# Patient Record
Sex: FEMALE | Race: WHITE | Employment: FULL TIME | URBAN - METROPOLITAN AREA
[De-identification: names, ages, dates, MRNs, and addresses within clinical notes are randomized per-mention and may not be internally consistent; named-entity substitution may affect disease eponyms.]

---

## 2017-02-09 ENCOUNTER — HOSPITAL ENCOUNTER (OUTPATIENT)
Age: 27
Setting detail: OBSERVATION
LOS: 1 days | Discharge: HOME | End: 2017-02-10
Attending: EMERGENCY MEDICINE | Admitting: HOSPITALIST
Payer: COMMERCIAL

## 2017-02-09 DIAGNOSIS — E10.10 DIABETIC KETOACIDOSIS WITHOUT COMA ASSOCIATED WITH TYPE 1 DIABETES MELLITUS (HCC): Primary | ICD-10-CM

## 2017-02-09 LAB
ADMINISTERED INITIALS, ADMINIT: NORMAL
ADMINISTERED INITIALS, ADMINIT: NORMAL
ALBUMIN SERPL BCP-MCNC: 4.5 G/DL (ref 3.4–5)
ALBUMIN/GLOB SERPL: 1 {RATIO} (ref 0.8–1.7)
ALP SERPL-CCNC: 63 U/L (ref 45–117)
ALT SERPL-CCNC: 22 U/L (ref 13–56)
ANION GAP BLD CALC-SCNC: 23 MMOL/L (ref 3–18)
APPEARANCE UR: CLEAR
AST SERPL W P-5'-P-CCNC: 11 U/L (ref 15–37)
BACTERIA URNS QL MICRO: NEGATIVE /HPF
BASOPHILS # BLD AUTO: 0 K/UL (ref 0–0.06)
BASOPHILS # BLD: 0 % (ref 0–3)
BILIRUB SERPL-MCNC: 0.7 MG/DL (ref 0.2–1)
BILIRUB UR QL: NEGATIVE
BUN SERPL-MCNC: 19 MG/DL (ref 7–18)
BUN/CREAT SERPL: 18 (ref 12–20)
CALCIUM SERPL-MCNC: 9.5 MG/DL (ref 8.5–10.1)
CHLORIDE SERPL-SCNC: 101 MMOL/L (ref 100–108)
CO2 SERPL-SCNC: 12 MMOL/L (ref 21–32)
COLOR UR: YELLOW
CREAT SERPL-MCNC: 1.08 MG/DL (ref 0.6–1.3)
D50 ADMINISTERED, D50ADM: 0 ML
D50 ADMINISTERED, D50ADM: 0 ML
D50 ORDER, D50ORD: 0 ML
D50 ORDER, D50ORD: 0 ML
DIFFERENTIAL METHOD BLD: ABNORMAL
EOSINOPHIL # BLD: 0 K/UL (ref 0–0.4)
EOSINOPHIL NFR BLD: 0 % (ref 0–5)
EPITH CASTS URNS QL MICRO: NORMAL /LPF (ref 0–5)
ERYTHROCYTE [DISTWIDTH] IN BLOOD BY AUTOMATED COUNT: 12.6 % (ref 11.6–14.5)
GLOBULIN SER CALC-MCNC: 4.6 G/DL (ref 2–4)
GLUCOSE BLD STRIP.AUTO-MCNC: 195 MG/DL (ref 70–110)
GLUCOSE BLD STRIP.AUTO-MCNC: 238 MG/DL (ref 70–110)
GLUCOSE SERPL-MCNC: 378 MG/DL (ref 74–99)
GLUCOSE UR STRIP.AUTO-MCNC: >1000 MG/DL
GLUCOSE, GLC: 195 MG/DL
GLUCOSE, GLC: 238 MG/DL
HCG UR QL: NEGATIVE
HCT VFR BLD AUTO: 51.2 % (ref 35–45)
HGB BLD-MCNC: 17.3 G/DL (ref 12–16)
HGB UR QL STRIP: NEGATIVE
HIGH TARGET, HITG: 180 MG/DL
HIGH TARGET, HITG: 180 MG/DL
INSULIN ADMINSTERED, INSADM: 3.6 UNITS/HOUR
INSULIN ADMINSTERED, INSADM: 4.1 UNITS/HOUR
INSULIN ORDER, INSORD: 3.6 UNITS/HOUR
INSULIN ORDER, INSORD: 4.1 UNITS/HOUR
KETONES UR QL STRIP.AUTO: >160 MG/DL
LEUKOCYTE ESTERASE UR QL STRIP.AUTO: NEGATIVE
LOW TARGET, LOT: 140 MG/DL
LOW TARGET, LOT: 140 MG/DL
LYMPHOCYTES # BLD AUTO: 4 % (ref 20–51)
LYMPHOCYTES # BLD: 0.8 K/UL (ref 0.8–3.5)
MCH RBC QN AUTO: 33.3 PG (ref 24–34)
MCHC RBC AUTO-ENTMCNC: 33.8 G/DL (ref 31–37)
MCV RBC AUTO: 98.7 FL (ref 74–97)
MINUTES UNTIL NEXT BG, NBG: 60 MIN
MINUTES UNTIL NEXT BG, NBG: 60 MIN
MONOCYTES # BLD: 0.4 K/UL (ref 0–1)
MONOCYTES NFR BLD AUTO: 2 % (ref 2–9)
MULTIPLIER, MUL: 0.02
MULTIPLIER, MUL: 0.03
NEUTS SEG # BLD: 18.8 K/UL (ref 1.8–8)
NEUTS SEG NFR BLD AUTO: 94 % (ref 42–75)
NITRITE UR QL STRIP.AUTO: NEGATIVE
ORDER INITIALS, ORDINIT: NORMAL
ORDER INITIALS, ORDINIT: NORMAL
PH UR STRIP: 5 [PH] (ref 5–8)
PLATELET # BLD AUTO: 265 K/UL (ref 135–420)
PLATELET COMMENTS,PCOM: ABNORMAL
PMV BLD AUTO: 10.9 FL (ref 9.2–11.8)
POTASSIUM SERPL-SCNC: 5.1 MMOL/L (ref 3.5–5.5)
PROT SERPL-MCNC: 9.1 G/DL (ref 6.4–8.2)
PROT UR STRIP-MCNC: 30 MG/DL
RBC # BLD AUTO: 5.19 M/UL (ref 4.2–5.3)
RBC #/AREA URNS HPF: NORMAL /HPF (ref 0–5)
RBC MORPH BLD: ABNORMAL
SODIUM SERPL-SCNC: 136 MMOL/L (ref 136–145)
SP GR UR REFRACTOMETRY: >1.03 (ref 1–1.03)
UROBILINOGEN UR QL STRIP.AUTO: 0.2 EU/DL (ref 0.2–1)
WBC # BLD AUTO: 20 K/UL (ref 4.6–13.2)
WBC URNS QL MICRO: NORMAL /HPF (ref 0–4)

## 2017-02-09 PROCEDURE — 74011250636 HC RX REV CODE- 250/636: Performed by: PHYSICIAN ASSISTANT

## 2017-02-09 PROCEDURE — 80053 COMPREHEN METABOLIC PANEL: CPT

## 2017-02-09 PROCEDURE — 85025 COMPLETE CBC W/AUTO DIFF WBC: CPT

## 2017-02-09 PROCEDURE — 74011250637 HC RX REV CODE- 250/637: Performed by: EMERGENCY MEDICINE

## 2017-02-09 PROCEDURE — 96375 TX/PRO/DX INJ NEW DRUG ADDON: CPT

## 2017-02-09 PROCEDURE — 99285 EMERGENCY DEPT VISIT HI MDM: CPT

## 2017-02-09 PROCEDURE — 96361 HYDRATE IV INFUSION ADD-ON: CPT

## 2017-02-09 PROCEDURE — 96376 TX/PRO/DX INJ SAME DRUG ADON: CPT

## 2017-02-09 PROCEDURE — 93005 ELECTROCARDIOGRAM TRACING: CPT

## 2017-02-09 PROCEDURE — 74011636637 HC RX REV CODE- 636/637: Performed by: EMERGENCY MEDICINE

## 2017-02-09 PROCEDURE — 82962 GLUCOSE BLOOD TEST: CPT

## 2017-02-09 PROCEDURE — 74011250636 HC RX REV CODE- 250/636: Performed by: EMERGENCY MEDICINE

## 2017-02-09 PROCEDURE — 96366 THER/PROPH/DIAG IV INF ADDON: CPT

## 2017-02-09 PROCEDURE — 81025 URINE PREGNANCY TEST: CPT

## 2017-02-09 PROCEDURE — 74011000258 HC RX REV CODE- 258: Performed by: EMERGENCY MEDICINE

## 2017-02-09 PROCEDURE — 96365 THER/PROPH/DIAG IV INF INIT: CPT

## 2017-02-09 PROCEDURE — 81001 URINALYSIS AUTO W/SCOPE: CPT

## 2017-02-09 RX ORDER — MAGNESIUM SULFATE 100 %
4 CRYSTALS MISCELLANEOUS AS NEEDED
Status: DISCONTINUED | OUTPATIENT
Start: 2017-02-09 | End: 2017-02-10 | Stop reason: HOSPADM

## 2017-02-09 RX ORDER — ONDANSETRON 4 MG/1
4 TABLET, ORALLY DISINTEGRATING ORAL
Status: COMPLETED | OUTPATIENT
Start: 2017-02-09 | End: 2017-02-09

## 2017-02-09 RX ORDER — DEXTROSE 50 % IN WATER (D50W) INTRAVENOUS SYRINGE
25-50 AS NEEDED
Status: DISCONTINUED | OUTPATIENT
Start: 2017-02-09 | End: 2017-02-10 | Stop reason: HOSPADM

## 2017-02-09 RX ORDER — ONDANSETRON 2 MG/ML
4 INJECTION INTRAMUSCULAR; INTRAVENOUS
Status: COMPLETED | OUTPATIENT
Start: 2017-02-09 | End: 2017-02-09

## 2017-02-09 RX ADMIN — ONDANSETRON 4 MG: 4 TABLET, ORALLY DISINTEGRATING ORAL at 23:51

## 2017-02-09 RX ADMIN — SODIUM CHLORIDE 2000 ML: 900 INJECTION, SOLUTION INTRAVENOUS at 23:51

## 2017-02-09 RX ADMIN — SODIUM CHLORIDE 3.6 UNITS/HR: 900 INJECTION, SOLUTION INTRAVENOUS at 21:48

## 2017-02-09 RX ADMIN — ONDANSETRON 4 MG: 2 INJECTION INTRAMUSCULAR; INTRAVENOUS at 20:22

## 2017-02-09 RX ADMIN — SODIUM CHLORIDE 1000 ML: 900 INJECTION, SOLUTION INTRAVENOUS at 20:24

## 2017-02-09 NOTE — LETTER
[address] [place] [address] [facility] EMERGENCY DEPT 
[address] [facility] [id number] [phone] Work/School Note Date: 2/9/2017 To Whom It May concern: 
 
Argelia Vail was seen and treated today in the emergency room by the following provider(s): 
Attending Provider: Misti Noyola MD. Argelia Vail may return to work on 2/11/17. Sincerely, Nikia Stewart RN

## 2017-02-09 NOTE — Clinical Note
Status[de-identified] Inpatient [101] Type of Bed: Stepdown [17] Inpatient Hospitalization Certified Necessary for the Following Reasons: 8. Other (further clarification in H&P documentation) Admitting Diagnosis: DKA (diabetic ketoacidoses) ([facility] [[id number]] Admitting Physician: Marck Salcedo Attending Physician: Marck Salcedo Estimated Length of Stay: > or = to 2 Midnights Discharge Plan[de-identified] Home with Office Follow-up

## 2017-02-10 VITALS
TEMPERATURE: 97.3 F | HEIGHT: 62 IN | OXYGEN SATURATION: 100 % | SYSTOLIC BLOOD PRESSURE: 104 MMHG | BODY MASS INDEX: 20.8 KG/M2 | HEART RATE: 134 BPM | WEIGHT: 113 LBS | RESPIRATION RATE: 19 BRPM | DIASTOLIC BLOOD PRESSURE: 53 MMHG

## 2017-02-10 PROBLEM — E11.10 DKA (DIABETIC KETOACIDOSES): Status: ACTIVE | Noted: 2017-02-10

## 2017-02-10 LAB
ANION GAP BLD CALC-SCNC: 12 MMOL/L (ref 3–18)
ANION GAP BLD CALC-SCNC: 16 MMOL/L (ref 3–18)
ANION GAP BLD CALC-SCNC: 9 MMOL/L (ref 3–18)
ATRIAL RATE: 144 BPM
BASOPHILS # BLD AUTO: 0 K/UL (ref 0–0.1)
BASOPHILS # BLD: 0 % (ref 0–2)
BUN SERPL-MCNC: 13 MG/DL (ref 7–18)
BUN SERPL-MCNC: 13 MG/DL (ref 7–18)
BUN SERPL-MCNC: 15 MG/DL (ref 7–18)
BUN/CREAT SERPL: 22 (ref 12–20)
BUN/CREAT SERPL: 23 (ref 12–20)
BUN/CREAT SERPL: 27 (ref 12–20)
CALCIUM SERPL-MCNC: 7.9 MG/DL (ref 8.5–10.1)
CALCIUM SERPL-MCNC: 8.1 MG/DL (ref 8.5–10.1)
CALCIUM SERPL-MCNC: 8.2 MG/DL (ref 8.5–10.1)
CALCULATED P AXIS, ECG09: 82 DEGREES
CALCULATED R AXIS, ECG10: 84 DEGREES
CALCULATED T AXIS, ECG11: 46 DEGREES
CHLORIDE SERPL-SCNC: 108 MMOL/L (ref 100–108)
CHLORIDE SERPL-SCNC: 108 MMOL/L (ref 100–108)
CHLORIDE SERPL-SCNC: 110 MMOL/L (ref 100–108)
CO2 SERPL-SCNC: 14 MMOL/L (ref 21–32)
CO2 SERPL-SCNC: 17 MMOL/L (ref 21–32)
CO2 SERPL-SCNC: 23 MMOL/L (ref 21–32)
CREAT SERPL-MCNC: 0.55 MG/DL (ref 0.6–1.3)
CREAT SERPL-MCNC: 0.56 MG/DL (ref 0.6–1.3)
CREAT SERPL-MCNC: 0.59 MG/DL (ref 0.6–1.3)
DIAGNOSIS, 93000: NORMAL
DIFFERENTIAL METHOD BLD: ABNORMAL
EOSINOPHIL # BLD: 0.1 K/UL (ref 0–0.4)
EOSINOPHIL NFR BLD: 1 % (ref 0–5)
ERYTHROCYTE [DISTWIDTH] IN BLOOD BY AUTOMATED COUNT: 12.9 % (ref 11.6–14.5)
GLUCOSE BLD STRIP.AUTO-MCNC: 145 MG/DL (ref 70–110)
GLUCOSE BLD STRIP.AUTO-MCNC: 153 MG/DL (ref 70–110)
GLUCOSE BLD STRIP.AUTO-MCNC: 89 MG/DL (ref 70–110)
GLUCOSE SERPL-MCNC: 130 MG/DL (ref 74–99)
GLUCOSE SERPL-MCNC: 145 MG/DL (ref 74–99)
GLUCOSE SERPL-MCNC: 237 MG/DL (ref 74–99)
HCT VFR BLD AUTO: 39.7 % (ref 35–45)
HGB BLD-MCNC: 13.6 G/DL (ref 12–16)
LYMPHOCYTES # BLD AUTO: 16 % (ref 21–52)
LYMPHOCYTES # BLD: 1.1 K/UL (ref 0.9–3.6)
MCH RBC QN AUTO: 32.7 PG (ref 24–34)
MCHC RBC AUTO-ENTMCNC: 34.3 G/DL (ref 31–37)
MCV RBC AUTO: 95.4 FL (ref 74–97)
MONOCYTES # BLD: 0.4 K/UL (ref 0.05–1.2)
MONOCYTES NFR BLD AUTO: 6 % (ref 3–10)
NEUTS SEG # BLD: 5.4 K/UL (ref 1.8–8)
NEUTS SEG NFR BLD AUTO: 77 % (ref 40–73)
P-R INTERVAL, ECG05: 120 MS
PHOSPHATE SERPL-MCNC: 1.6 MG/DL (ref 2.5–4.9)
PLATELET # BLD AUTO: 195 K/UL (ref 135–420)
PMV BLD AUTO: 10.7 FL (ref 9.2–11.8)
POTASSIUM SERPL-SCNC: 3.8 MMOL/L (ref 3.5–5.5)
POTASSIUM SERPL-SCNC: 4 MMOL/L (ref 3.5–5.5)
POTASSIUM SERPL-SCNC: 4.3 MMOL/L (ref 3.5–5.5)
Q-T INTERVAL, ECG07: 284 MS
QRS DURATION, ECG06: 68 MS
QTC CALCULATION (BEZET), ECG08: 439 MS
RBC # BLD AUTO: 4.16 M/UL (ref 4.2–5.3)
SODIUM SERPL-SCNC: 138 MMOL/L (ref 136–145)
SODIUM SERPL-SCNC: 139 MMOL/L (ref 136–145)
SODIUM SERPL-SCNC: 140 MMOL/L (ref 136–145)
VENTRICULAR RATE, ECG03: 144 BPM
WBC # BLD AUTO: 7 K/UL (ref 4.6–13.2)

## 2017-02-10 PROCEDURE — 74011250636 HC RX REV CODE- 250/636: Performed by: EMERGENCY MEDICINE

## 2017-02-10 PROCEDURE — 99218 HC RM OBSERVATION: CPT

## 2017-02-10 PROCEDURE — 84100 ASSAY OF PHOSPHORUS: CPT | Performed by: EMERGENCY MEDICINE

## 2017-02-10 PROCEDURE — 80048 BASIC METABOLIC PNL TOTAL CA: CPT | Performed by: EMERGENCY MEDICINE

## 2017-02-10 PROCEDURE — 85025 COMPLETE CBC W/AUTO DIFF WBC: CPT | Performed by: HOSPITALIST

## 2017-02-10 PROCEDURE — 96361 HYDRATE IV INFUSION ADD-ON: CPT

## 2017-02-10 PROCEDURE — 82962 GLUCOSE BLOOD TEST: CPT

## 2017-02-10 PROCEDURE — 74011000258 HC RX REV CODE- 258: Performed by: HOSPITALIST

## 2017-02-10 RX ORDER — INSULIN GLARGINE 100 [IU]/ML
35 INJECTION, SOLUTION SUBCUTANEOUS
Status: DISCONTINUED | OUTPATIENT
Start: 2017-02-10 | End: 2017-02-10 | Stop reason: HOSPADM

## 2017-02-10 RX ORDER — ADHESIVE BANDAGE
30 BANDAGE TOPICAL DAILY PRN
Status: DISCONTINUED | OUTPATIENT
Start: 2017-02-10 | End: 2017-02-10 | Stop reason: HOSPADM

## 2017-02-10 RX ORDER — SODIUM,POTASSIUM PHOSPHATES 280-250MG
2 POWDER IN PACKET (EA) ORAL 2 TIMES DAILY
Status: DISCONTINUED | OUTPATIENT
Start: 2017-02-10 | End: 2017-02-10 | Stop reason: HOSPADM

## 2017-02-10 RX ORDER — ONDANSETRON 2 MG/ML
4 INJECTION INTRAMUSCULAR; INTRAVENOUS
Status: DISCONTINUED | OUTPATIENT
Start: 2017-02-10 | End: 2017-02-10 | Stop reason: HOSPADM

## 2017-02-10 RX ORDER — ENOXAPARIN SODIUM 100 MG/ML
40 INJECTION SUBCUTANEOUS DAILY
Status: DISCONTINUED | OUTPATIENT
Start: 2017-02-10 | End: 2017-02-10 | Stop reason: HOSPADM

## 2017-02-10 RX ORDER — ONDANSETRON 4 MG/1
4 TABLET, ORALLY DISINTEGRATING ORAL
Qty: 15 TAB | Refills: 0 | Status: ON HOLD | OUTPATIENT
Start: 2017-02-10 | End: 2017-03-25

## 2017-02-10 RX ORDER — FAMOTIDINE 20 MG/1
20 TABLET, FILM COATED ORAL 2 TIMES DAILY
Status: DISCONTINUED | OUTPATIENT
Start: 2017-02-10 | End: 2017-02-10 | Stop reason: HOSPADM

## 2017-02-10 RX ORDER — DEXTROSE MONOHYDRATE AND SODIUM CHLORIDE 5; .9 G/100ML; G/100ML
125 INJECTION, SOLUTION INTRAVENOUS CONTINUOUS
Status: DISCONTINUED | OUTPATIENT
Start: 2017-02-10 | End: 2017-02-10

## 2017-02-10 RX ORDER — INSULIN GLARGINE 100 [IU]/ML
35 INJECTION, SOLUTION SUBCUTANEOUS DAILY
Qty: 1 VIAL | Refills: 0 | Status: SHIPPED | OUTPATIENT
Start: 2017-02-10 | End: 2018-09-21

## 2017-02-10 RX ORDER — ACETAMINOPHEN 325 MG/1
650 TABLET ORAL
Status: DISCONTINUED | OUTPATIENT
Start: 2017-02-10 | End: 2017-02-10 | Stop reason: HOSPADM

## 2017-02-10 RX ORDER — ONDANSETRON 2 MG/ML
4 INJECTION INTRAMUSCULAR; INTRAVENOUS
Status: COMPLETED | OUTPATIENT
Start: 2017-02-10 | End: 2017-02-10

## 2017-02-10 RX ORDER — SODIUM,POTASSIUM PHOSPHATES 280-250MG
2 POWDER IN PACKET (EA) ORAL 2 TIMES DAILY
Qty: 12 PACKET | Refills: 0 | Status: SHIPPED | OUTPATIENT
Start: 2017-02-10 | End: 2017-02-13

## 2017-02-10 RX ADMIN — ONDANSETRON 4 MG: 2 INJECTION INTRAMUSCULAR; INTRAVENOUS at 08:50

## 2017-02-10 RX ADMIN — SODIUM CHLORIDE, SODIUM LACTATE, POTASSIUM CHLORIDE, AND CALCIUM CHLORIDE 1000 ML: 600; 310; 30; 20 INJECTION, SOLUTION INTRAVENOUS at 08:51

## 2017-02-10 RX ADMIN — DEXTROSE MONOHYDRATE AND SODIUM CHLORIDE 125 ML/HR: 5; .9 INJECTION, SOLUTION INTRAVENOUS at 10:57

## 2017-02-10 NOTE — ED NOTES
0700: I assumed care of this patient from Dr. Twila Velez. Patient presented with DKA on insulin drip managed in the emergency department for 10 hours and has not resolved her acidosis as of yet. She was signed out pending a callback from the hospitalist for admission. Patient was reevaluated at 0810 hrs. She feels much better, has mild nausea at this time. Feels that she had a flulike illness that precipitated these events. This the 1st time in DKA. Clinically she appears dehydrated, tongue is very dry, abdomen benign. We'll order a 1 L bolus of lactated ringer's that she has what seems to be a hyperchloremic acidosis, still awaiting hospitalist callback at 0830 hrs. Patient's presentation, history, physical exam and laboratory evaluations were reviewed. I felt the patient would benefit from inpatient management and treatment. Consult:  Discussed care with Dr. Tyrese Chisholm. Standard discussion; including history of patients chief complaint, available diagnostic results, and treatment course. Patient was accepted to their service. Admit to dr Surinder Ramirez        Disposition:    Admitted to stepdown      Portions of this chart were created with Dragon medical speech to text program.   Unrecognized errors may be present.

## 2017-02-10 NOTE — ED NOTES
Received report on patient at this time. Patient awake and alert complaining of mild nausea, however is drinking PO diet ginger ale. Dr. Sridevi Odonnell in room for assessment.

## 2017-02-10 NOTE — ED NOTES
I performed a brief evaluation, including history and physical, of the patient here in triage and I have determined that pt will need further treatment and evaluation from the main side ER physician. I have placed initial orders to help in expediting patients care. Type 1 diabetic with abdominal pain and vomiting. Labs ordered.     February 09, 2017 at 7:43 PM - ALEX Mireles

## 2017-02-10 NOTE — ED TRIAGE NOTES
Pt states she has been vomiting since yesterday.   Pain flank area,  Pt states she is type 1 diabetes,   And her sugar was 70

## 2017-02-10 NOTE — ED NOTES
Patient armband removed and shredded  If you experience chest pain call 911. Do not drive yourself. Patient discharged on three prescriptions.

## 2017-02-10 NOTE — ED NOTES
Patient has been evaluated by Dr. Pamela Warner, repeat labs are completely resolved, patient is well-appearing and eager to be discharged home.   She has a prescription written for Lantus and phosphorus replacement        Disposition:    Discharged from emergency department

## 2017-02-10 NOTE — ED PROVIDER NOTES
HPI Comments: 8:12 PM Thuy Delaney is a 23 y.o. female who presents to the ED c/o N/V onset this morning at approximately 4 AM. Describes that she has been trying to drink plenty of fluids, but states that she is unable to keep anything down, because she vomits every 20 minutes. Patient notes a history if Type 1 DM, and reports that lately her Insulin pump has not been working, so she switched to Insulin shots until she gets a new pump. Mentions that she has been checking her sugars, with the highest being in the 400s. Other sx include abdominal pain, bilateral lower back pain, chills, fatigue, and possible fever. Denies diarrhea. States that she looked on [employer], which suspected that she might have DKA. The patient notes that she has never experienced DKA before. No further complaints at this time. The history is provided by the patient. Past Medical History:   Diagnosis Date    ADHD (attention deficit hyperactivity disorder)     Diabetes mellitus type 1 ([facility]     Endometriosis        Past Surgical History:   Procedure Laterality Date    Hx orthopaedic  5/23/2014     right medial ankle     Hx back surgery  5/23/2014     lower back          Family History:   Problem Relation Age of Onset   Orma Damme Arthritis-rheumatoid Paternal Grandmother     Hypertension Mother     Diabetes Maternal Grandmother      type 2    Heart Disease Neg Hx        Social History     Social History    Marital status: SINGLE     Spouse name: N/A    Number of children: N/A    Years of education: N/A     Occupational History    Not on file.      Social History Main Topics    Smoking status: Former Smoker    Smokeless tobacco: Never Used    Alcohol use 0.0 oz/week     0 Standard drinks or equivalent per week      Comment: occasionally     Drug use: No    Sexual activity: Not on file     Other Topics Concern    Not on file     Social History Narrative         ALLERGIES: Review of patient's allergies indicates no known allergies. Review of Systems   Constitutional: Positive for chills, fatigue and fever. Gastrointestinal: Positive for abdominal pain, nausea and vomiting. Negative for diarrhea. Musculoskeletal: Positive for back pain. All other systems reviewed and are negative. Vitals:    02/09/17 1944   BP: 136/77   Pulse: (!) 134   Resp: 19   Temp: 97.3 °F (36.3 °C)   SpO2: 100%            Physical Exam   Constitutional: She is oriented to person, place, and time. She appears well-developed. Dry mucous membranes     HENT:   Head: Normocephalic and atraumatic. Eyes: EOM are normal. Pupils are equal, round, and reactive to light. Neck: Normal range of motion. Neck supple. Cardiovascular: Normal rate, regular rhythm and normal heart sounds. Exam reveals no friction rub. No murmur heard. Pulmonary/Chest: Effort normal and breath sounds normal. No respiratory distress. She has no wheezes. Abdominal: Soft. She exhibits no distension. There is no tenderness. There is no rebound and no guarding. Musculoskeletal: Normal range of motion. Neurological: She is alert and oriented to person, place, and time. Skin: Skin is warm and dry. Psychiatric: She has a normal mood and affect. Her behavior is normal. Thought content normal.        MDM  Number of Diagnoses or Management Options  Diagnosis management comments:  EKG: Sinus tachycardia 144 and axis intervals no ST elevation or depression or hypertrophy. Mildly [de-identified] T at most    21-year-old female history of type 1 diabetes developed diabetes at age 24 with no history of DKA presents with intermittent vomiting and elevated blood glucose and concerns of DKA. Patient states she has never had DKA before but is vomiting all day today was unsure if it was a stomach bug or food poisoning but with the persistent vomiting in addition to elevated blood glucose she googled and found that she may in fact have DKA.   She appears very dehydrated mucous membranes are dry elevated white count is noted and likely due to hemoconcentration pending blood work at the insulin glucose stabilizer has been initiated. Low suspicion at this elevated white count is actually infection most likely secondary to hemoconcentration due to dehydration secondary to DKA    Unable to reach hospitalist; gap closed and bicarb improved then both worsened. Turned over to dr Cindy Thompson pending admission. Funmilayo Hall MD.    6:44 AM initially made progress but now with some regression; will d/w hospitalist for admission. ED Course       Procedures     2:18 AM  Patient is sleeping comfortably in bed.     5:40 AM   Patient is doing better, but still slightly nauseated. SCRIBE ATTESTATION STATEMENT  Documented by: Giselle Camacho scribing for, and in the presence of, Funmilayo Hall MD      Signed by: Radha Perea, 2/9/2017, 8:30 PM    PROVIDER ATTESTATION STATEMENT  I personally performed the services described in the documentation, reviewed the documentation, as recorded by the scribe in my presence, and it accurately and completely records my words and actions.   Funmilayo Hall MD

## 2017-02-10 NOTE — DISCHARGE SUMMARY
Discharge Summary    Patient: Minh Howard MRN: [id number]  CSN: [id number]    YOB: 1990  Age: 23 y.o. Sex: female    DOA: 2/9/2017 LOS:  LOS: 1 day   Discharge Date:      Admission Diagnoses: DKA (diabetic ketoacidoses) ([facility]  DKA (diabetic ketoacidoses) ([facility]    Discharge Diagnoses:    1. DKA: improved. 2. SIRS with tachy and Leucocytosis due to # 1: resolved   3. N/V due to # 1, better. Mliss Tucker Discharge Condition: Stable    PHYSICAL EXAM  Visit Vitals    /65    Pulse 92    Temp 97.3 °F (36.3 °C)    Resp 19    Ht 5' 2\" (1.575 m)    Wt 51.3 kg (113 lb)    SpO2 99%    BMI 20.67 kg/m2       General: Alert, cooperative, no acute distress    HEENT: PERRLA, EOMI. Anicteric sclerae. Lungs:  CTA Bilaterally. No Wheezing/Rhonchi/Rales. Heart:  Regular rate and Rhythm. Abdomen: Soft, Non distended, Non tender. + Bowel sounds. Extremities: No edema/ cyanosis/ clubbing  Psych:   Good insight. Not anxious or agitated. Neurologic:  AA oriented X 3. Moves all extremities. Hospital Course:   1. DKA: improved with insulin drip. 2. SIRS with tachy and Leucocytosis due to # 1: resolved   3. N/V due to # 1, better. Pt feeling better, repeat labs better, DKA resolved. Repeat CBC better. Tolerating PO well. [address] [address] for d/c home and follow up with PCP. Discharge Medications:     Current Discharge Medication List      START taking these medications    Details   potassium, sodium phosphates (NEUTRA-PHOS) 280-160-250 mg packet Take 2 Packets by mouth two (2) times a day for 3 days. Qty: 12 Packet, Refills: 0         CONTINUE these medications which have CHANGED    Details   insulin glargine (LANTUS) 100 unit/mL injection 35 Units by SubCUTAneous route daily.   Qty: 1 Vial, Refills: 0         CONTINUE these medications which have NOT CHANGED    Details   fluticasone (FLONASE) 50 mcg/actuation nasal spray 2 Sprays by Both Nostrils route daily as needed for Rhinitis. Qty: 1 Bottle, Refills: 0    Associated Diagnoses: Acute sinusitis, recurrence not specified, unspecified location      dextroamphetamine-amphetamine (ADDERALL) 10 mg tablet Take one tablet by mouth daily. May repeat 1/2-1 tab between 12noon-2pm  Qty: 60 Tab, Refills: 0      oxyCODONE-acetaminophen (PERCOCET) 5-325 mg per tablet Take 1 Tab by mouth every twelve (12) hours as needed for Pain. Max Daily Amount: 2 Tabs. Qty: 30 Tab, Refills: 0    Associated Diagnoses: Chronic pain of right ankle      desogestrel-ethinyl estradiol (DESOGEN) 0.15-0.03 mg per tablet Take 1 Tab by mouth daily. Qty: 1 Package, Refills: 11      naproxen (NAPROSYN) 500 mg tablet Take 1 Tab by mouth two (2) times daily (with meals). Qty: 60 Tab, Refills: 1    Associated Diagnoses: Chronic pain of right ankle      insulin lispro (HUMALOG) 100 unit/mL injection by SubCUTAneous route. STOP taking these medications        insulin pump (PATIENT SUPPLIED) misc Comments:   Reason for Stopping:             · It is important that you take the medication exactly as they are prescribed. · Keep your medication in the bottles provided by the pharmacist and keep a list of the medication names, dosages, and times to be taken in your wallet. · Do not take other medications without consulting your doctor. DIET:  Diabetic Diet    ACTIVITY: Activity as tolerated    ADDITIONAL INFORMATION: If you experience any of the following symptoms but not limited to Fever, chills, nausea, vomiting, diarrhea, change in mentation, falling, bleeding, shortness of breath, chest pain, please call your primary care physician or return to the emergency room if you cannot get hold of your doctor:     FOLLOW UP CARE:  Dr. Kim Sarmiento in 7-10 days. Please call and set up an appointment.     Minutes spent on discharge: 40 minutes spent coordinating this discharge (review instructions/follow-up, prescriptions, preparing report for sign off)    Misa Kearney MD  2/10/2017 12:40 PM

## 2017-02-10 NOTE — H&P
History & Physical    Patient: Funmilayo Miranda MRN: [id number]  CSN: [id number]    YOB: 1990  Age: 23 y.o. Sex: female      DOA: 2/9/2017    Chief Complaint:   Chief Complaint   Patient presents with    Vomiting     HPI:     Funmilayo Miranda is a 23 y.o.  female who came to ED with N/V and found to be in DKA. Pt c/o having N/V started on Thursday 4 am, she couldn't keep anything down. No fevers or chills. No dysuria or frequency. Denies any abd pain. C/o mild back pain due to lying in bed. No flank pain. Pt says she feels better now. Pt been off insulin drip since this am due to low BS, her bicarb is still 14. Per pt she had flu like symptoms last week and was Rx with Z pack, which she finished it. Feels better now. Past Medical History   Diagnosis Date    ADHD (attention deficit hyperactivity disorder)     Diabetes mellitus type 1 ([facility]     Endometriosis        Past Surgical History   Procedure Laterality Date    Hx orthopaedic  5/23/2014     right medial ankle     Hx back surgery  5/23/2014     lower back        Family History   Problem Relation Age of Onset    Arthritis-rheumatoid Paternal Grandmother     Hypertension Mother     Diabetes Maternal Grandmother      type 2    Heart Disease Neg Hx        Social History     Social History    Marital status: SINGLE     Spouse name: N/A    Number of children: N/A    Years of education: N/A     Social History Main Topics    Smoking status: Former Smoker    Smokeless tobacco: Never Used    Alcohol use 0.0 oz/week     0 Standard drinks or equivalent per week      Comment: occasionally     Drug use: No    Sexual activity: Not Asked     Other Topics Concern    None     Social History Narrative       Prior to Admission medications    Medication Sig Start Date End Date Taking? Authorizing Provider   fluticasone (FLONASE) 50 mcg/actuation nasal spray 2 Sprays by Both Nostrils route daily as needed for Rhinitis. 3/4/16   Roxanne Kirk NP   dextroamphetamine-amphetamine (ADDERALL) 10 mg tablet Take one tablet by mouth daily. May repeat 1/2-1 tab between 12noon-2pm 1/13/16   Roxanne Kirk NP   oxyCODONE-acetaminophen (PERCOCET) 5-325 mg per tablet Take 1 Tab by mouth every twelve (12) hours as needed for Pain. Max Daily Amount: 2 Tabs. 1/13/16   Roxanne Kirk NP   INSULIN [facility]. REC. ANLOG (LANTUS SC) by SubCUTAneous route. Historical Provider   desogestrel-ethinyl estradiol (DESOGEN) 0.15-0.03 mg per tablet Take 1 Tab by mouth daily. 6/11/15   Roxanne iKrk NP    insulin pump (PATIENT SUPPLIED) misc by SubCUTAneous route as needed. Historical Provider   naproxen (NAPROSYN) 500 mg tablet Take 1 Tab by mouth two (2) times daily (with meals). 5/14/15   Roxanne Kirk NP   insulin lispro (HUMALOG) 100 unit/mL injection by SubCUTAneous route. Phys Other, MD       No Known Allergies      Review of Systems  GENERAL: Patient alert, awake and oriented times 3, able to communicate full sentences and not in distress. HEENT: No change in vision, no earache, tinnitus, sore throat or sinus congestion. NECK: No pain or stiffness. PULMONARY: No shortness of breath, cough or wheeze. Cardiovascular: no pnd or orthopnea, no CP  GASTROINTESTINAL: No abdominal pain, + nausea, + vomiting, no diarrhea or melena or bright red blood per rectum. GENITOURINARY: No urinary frequency, urgency, hesitancy or dysuria. MUSCULOSKELETAL: No joint or muscle pain, + back pain, no recent trauma. DERMATOLOGIC: No rash, no itching, no lesions. ENDOCRINE: No polyuria, polydipsia, no heat or cold intolerance. No recent change in weight. HEMATOLOGICAL: No anemia or easy bruising or bleeding. NEUROLOGIC: No headache, seizures, numbness, tingling or weakness.        Physical Exam:     Physical Exam:  Visit Vitals    /40    Pulse (!) 134, current HR 92    Temp 97.3 °F (36.3 °C)    Resp 19    Ht 5' 2\" (1.575 m)    Wt 51.3 kg (113 lb)    LMP 2017    SpO2 98%    BMI 20.67 kg/m2      O2 Device: Room air    Temp (24hrs), Av.3 °F (36.3 °C), Min:97.3 °F (36.3 °C), Max:97.3 °F (36.3 °C)             General:  Alert, cooperative, no distress, appears stated age. Head: Normocephalic, without obvious abnormality, atraumatic. Eyes:  Conjunctivae/corneas clear. PERRL, EOMs intact. Neck: Supple, symmetrical, trachea midline, no adenopathy, thyroid: no enlargement, no carotid bruit and no JVD. Lungs:   Clear to auscultation bilaterally. Heart:  Regular rate and rhythm, S1, S2 normal.     Abdomen: Soft, non-tender. Bowel sounds normal. No flank tenderness    Extremities: Extremities normal, atraumatic, no cyanosis or edema. Pulses: 2+ and symmetric all extremities. Skin:  No rashes or lesions   Neurologic: AAOx3, No focal motor or sensory deficit.        Labs Reviewed:    BMP:   Lab Results   Component Value Date/Time     02/10/2017 06:00 AM    K 4.3 02/10/2017 06:00 AM     02/10/2017 06:00 AM    CO2 14 (L) 02/10/2017 06:00 AM    AGAP 16 02/10/2017 06:00 AM     (H) 02/10/2017 06:00 AM    BUN 13 02/10/2017 06:00 AM    CREA 0.56 (L) 02/10/2017 06:00 AM    GFRAA >60 02/10/2017 06:00 AM    GFRNA >60 02/10/2017 06:00 AM     CMP:   Lab Results   Component Value Date/Time     02/10/2017 06:00 AM    K 4.3 02/10/2017 06:00 AM     02/10/2017 06:00 AM    CO2 14 (L) 02/10/2017 06:00 AM    AGAP 16 02/10/2017 06:00 AM     (H) 02/10/2017 06:00 AM    BUN 13 02/10/2017 06:00 AM    CREA 0.56 (L) 02/10/2017 06:00 AM    GFRAA >60 02/10/2017 06:00 AM    GFRNA >60 02/10/2017 06:00 AM    CA 8.1 (L) 02/10/2017 06:00 AM    ALB 4.5 2017 07:59 PM    TP 9.1 (H) 2017 07:59 PM    GLOB 4.6 (H) 2017 07:59 PM    AGRAT 1.0 2017 07:59 PM    SGOT 11 (L) 2017 07:59 PM    ALT 22 2017 07:59 PM     CBC:   Lab Results   Component Value Date/Time    WBC 20.0 (H) 2017 07:59 PM    HGB 17.3 (H) 02/09/2017 07:59 PM    HCT 51.2 (H) 02/09/2017 07:59 PM     02/09/2017 07:59 PM     All Cardiac Markers in the last 24 hours: No results found for: CPK, CKMMB, CKMB, RCK3, CKMBT, CKNDX, CKND1, TENZIN, TROPT, TROIQ, MAGALI, TROPT, TNIPOC, BNP, BNPP  Recent Glucose Results:   Lab Results   Component Value Date/Time     (H) 02/10/2017 06:00 AM     (H) 02/10/2017 01:40 AM     (H) 02/09/2017 07:59 PM       Procedures/imaging: see electronic medical records for all procedures/Xrays and details which were not copied into this note but were reviewed prior to creation of Plan      Assessment/Plan     1. DKA: cont insulin drip with DKA protocol, start IVF D5 since her bicarb still 14 and AG 16. BMP Q6H. Start clear liquids   2. SIRS with tachy and Leucocytosis due to # 1: no signs of infection, will get CXR, UA neg, will monitor  3. N/V due to # 1, better. Zofran PRN  DVT/GI Prophylaxis: Lovenox and H2B/PPI    Discussed with patient and BF at bedside about hospital admission and my plan care, both understood and agree with my plan care. Meet Dad at bedside, asked pt if i can explain to DAD about my A/P, she said she will talk to him.       Jose Raul Brito MD  2/10/2017 10:23 AM

## 2017-02-10 NOTE — DISCHARGE INSTRUCTIONS
Discharge Instructions    Patient: Tj Trejo MRN: [id number]  CSN: [id number]    YOB: 1990  Age: 23 y.o. Sex: female    DOA: 2/9/2017 LOS:  LOS: 1 day   Discharge Date:      DIET:  Diabetic Diet    ACTIVITY: Activity as tolerated    ADDITIONAL INFORMATION: If you experience any of the following symptoms but not limited to Fever, chills, nausea, vomiting, diarrhea, change in mentation, falling, bleeding, shortness of breath, chest pain, please call your primary care physician or return to the emergency room if you cannot get hold of your doctor:     FOLLOW UP CARE:  Dr. Cortez Ruth in 7-10 days. Please call and set up an appointment.       Estella Chaidez MD  2/10/2017 12:39 PM

## 2018-01-29 ENCOUNTER — HOSPITAL ENCOUNTER (EMERGENCY)
Age: 28
Discharge: HOME | End: 2018-01-29
Attending: EMERGENCY MEDICINE | Admitting: EMERGENCY MEDICINE
Payer: COMMERCIAL

## 2018-01-29 ENCOUNTER — APPOINTMENT (OUTPATIENT)
Dept: ULTRASOUND IMAGING | Age: 28
End: 2018-01-29
Attending: EMERGENCY MEDICINE
Payer: COMMERCIAL

## 2018-01-29 VITALS
SYSTOLIC BLOOD PRESSURE: 114 MMHG | DIASTOLIC BLOOD PRESSURE: 71 MMHG | RESPIRATION RATE: 18 BRPM | HEART RATE: 83 BPM | WEIGHT: 118 LBS | HEIGHT: 62 IN | OXYGEN SATURATION: 100 % | TEMPERATURE: 98.3 F | BODY MASS INDEX: 21.71 KG/M2

## 2018-01-29 DIAGNOSIS — N93.8 DUB (DYSFUNCTIONAL UTERINE BLEEDING): Primary | ICD-10-CM

## 2018-01-29 LAB
ALBUMIN SERPL-MCNC: 4 G/DL (ref 3.4–5)
ALBUMIN/GLOB SERPL: 1.2 {RATIO} (ref 0.8–1.7)
ALP SERPL-CCNC: 41 U/L (ref 45–117)
ALT SERPL-CCNC: 19 U/L (ref 13–56)
ANION GAP SERPL CALC-SCNC: 7 MMOL/L (ref 3–18)
AST SERPL-CCNC: 9 U/L (ref 15–37)
BASOPHILS # BLD: 0 K/UL (ref 0–0.06)
BASOPHILS NFR BLD: 0 % (ref 0–2)
BILIRUB SERPL-MCNC: 1 MG/DL (ref 0.2–1)
BUN SERPL-MCNC: 10 MG/DL (ref 7–18)
BUN/CREAT SERPL: 16 (ref 12–20)
CALCIUM SERPL-MCNC: 8.9 MG/DL (ref 8.5–10.1)
CHLORIDE SERPL-SCNC: 102 MMOL/L (ref 100–108)
CO2 SERPL-SCNC: 29 MMOL/L (ref 21–32)
CREAT SERPL-MCNC: 0.62 MG/DL (ref 0.6–1.3)
DIFFERENTIAL METHOD BLD: ABNORMAL
EOSINOPHIL # BLD: 0 K/UL (ref 0–0.4)
EOSINOPHIL NFR BLD: 1 % (ref 0–5)
ERYTHROCYTE [DISTWIDTH] IN BLOOD BY AUTOMATED COUNT: 12.3 % (ref 11.6–14.5)
GLOBULIN SER CALC-MCNC: 3.3 G/DL (ref 2–4)
GLUCOSE SERPL-MCNC: 295 MG/DL (ref 74–99)
HCG SERPL QL: NEGATIVE
HCT VFR BLD AUTO: 42.4 % (ref 35–45)
HGB BLD-MCNC: 14.4 G/DL (ref 12–16)
LIPASE SERPL-CCNC: 70 U/L (ref 73–393)
LYMPHOCYTES # BLD: 1.3 K/UL (ref 0.9–3.6)
LYMPHOCYTES NFR BLD: 16 % (ref 21–52)
MCH RBC QN AUTO: 32.1 PG (ref 24–34)
MCHC RBC AUTO-ENTMCNC: 34 G/DL (ref 31–37)
MCV RBC AUTO: 94.4 FL (ref 74–97)
MONOCYTES # BLD: 0.9 K/UL (ref 0.05–1.2)
MONOCYTES NFR BLD: 11 % (ref 3–10)
NEUTS SEG # BLD: 6.2 K/UL (ref 1.8–8)
NEUTS SEG NFR BLD: 72 % (ref 40–73)
PLATELET # BLD AUTO: 191 K/UL (ref 135–420)
PMV BLD AUTO: 10.2 FL (ref 9.2–11.8)
POTASSIUM SERPL-SCNC: 4.2 MMOL/L (ref 3.5–5.5)
PROT SERPL-MCNC: 7.3 G/DL (ref 6.4–8.2)
RBC # BLD AUTO: 4.49 M/UL (ref 4.2–5.3)
SODIUM SERPL-SCNC: 138 MMOL/L (ref 136–145)
WBC # BLD AUTO: 8.5 K/UL (ref 4.6–13.2)

## 2018-01-29 PROCEDURE — 76830 TRANSVAGINAL US NON-OB: CPT

## 2018-01-29 PROCEDURE — 83690 ASSAY OF LIPASE: CPT

## 2018-01-29 PROCEDURE — 85025 COMPLETE CBC W/AUTO DIFF WBC: CPT

## 2018-01-29 PROCEDURE — 99282 EMERGENCY DEPT VISIT SF MDM: CPT

## 2018-01-29 PROCEDURE — 80053 COMPREHEN METABOLIC PANEL: CPT

## 2018-01-29 PROCEDURE — 84703 CHORIONIC GONADOTROPIN ASSAY: CPT | Performed by: EMERGENCY MEDICINE

## 2018-01-29 RX ORDER — ONDANSETRON 2 MG/ML
4 INJECTION INTRAMUSCULAR; INTRAVENOUS
Status: DISCONTINUED | OUTPATIENT
Start: 2018-01-29 | End: 2018-01-29 | Stop reason: HOSPADM

## 2018-01-29 NOTE — DISCHARGE INSTRUCTIONS
Abnormal Uterine Bleeding: Care Instructions  Your Care Instructions    Abnormal uterine bleeding (AUB) is irregular bleeding from the uterus that is longer or heavier than usual or does not occur at your regular time. Sometimes it is caused by changes in hormone levels. It can also be caused by growths in the uterus, such as fibroids or polyps. Sometimes a cause cannot be found. You may have heavy bleeding when you are not expecting your period. Your doctor may suggest a pregnancy test, if you think you are pregnant. Follow-up care is a key part of your treatment and safety. Be sure to make and go to all appointments, and call your doctor if you are having problems. It's also a good idea to know your test results and keep a list of the medicines you take. How can you care for yourself at home? · Be safe with medicines. Take pain medicines exactly as directed. ¨ If the doctor gave you a prescription medicine for pain, take it as prescribed. ¨ If you are not taking a prescription pain medicine, ask your doctor if you can take an over-the-counter medicine. · You may be low in iron because of blood loss. Eat a balanced diet that is high in iron and vitamin C. Foods rich in iron include red meat, shellfish, eggs, beans, and leafy green vegetables. Talk to your doctor about whether you need to take iron pills or a multivitamin. When should you call for help? Call 911 anytime you think you may need emergency care. For example, call if:  ? · You passed out (lost consciousness). ?Call your doctor now or seek immediate medical care if:  ? · You have new or worse belly or pelvic pain. ? · You have severe vaginal bleeding. ? · You feel dizzy or lightheaded, or you feel like you may faint. ? Watch closely for changes in your health, and be sure to contact your doctor if:  ? · You think you may be pregnant. ? · Your bleeding gets worse. ? · You do not get better as expected.    Where can you learn more?  Go to http://tavon-christianne.info/. Enter O885 in the search box to learn more about \"Abnormal Uterine Bleeding: Care Instructions. \"  Current as of: October 13, 2016  Content Version: 11.4  © 5930-3138 [employer]. Care instructions adapted under license by [employer] (which disclaims liability or warranty for this information). If you have questions about a medical condition or this instruction, always ask your healthcare professional. [place] [address] any warranty or liability for your use of this information.

## 2018-01-29 NOTE — ED PROVIDER NOTES
EMERGENCY DEPARTMENT HISTORY AND PHYSICAL EXAM    12:06 PM      Date: 1/29/2018  Patient Name: Ambar Espinosa    History of Presenting Illness     No chief complaint on file. History Provided By: Patient    Additional History (Context):    Ambar Espinosa is a 33 y.o. female presents to the ED c/o severe, vaginal bleeding, onset last night. Also reports sharp/shooting lower abd pain. Notes she started her menstrual cycle last night, but she is bleeding heavier than normal. States sxs are similar to when she had a ruptured cyst. Reports she called her PCP, who instructed her to present to the ED to have her iron checked. No other acute symptoms or complaints were noted. PCP: ALEX Izquierdo    Chief Complaint: vaginal bleeding  Duration:  Hours  Timing:  Worsening  Location: Genital  Severity: Severe  Modifying Factors: No alleviating or exacerbating factors reported  Associated Symptoms: abd pain      Current Facility-Administered Medications   Medication Dose Route Frequency Provider Last Rate Last Dose    ondansetron (ZOFRAN) injection 4 mg  4 mg IntraVENous NOW Tono Willams PA-C         Current Outpatient Prescriptions   Medication Sig Dispense Refill    insulin glargine (LANTUS) 100 unit/mL injection 35 Units by SubCUTAneous route daily. 1 Vial 0    fluticasone (FLONASE) 50 mcg/actuation nasal spray 2 Sprays by Both Nostrils route daily as needed for Rhinitis. 1 Bottle 0     insulin pump (PATIENT SUPPLIED) misc by SubCUTAneous route as needed.  insulin lispro (HUMALOG) 100 unit/mL injection by SubCUTAneous route.            Past History     Past Medical History:  Past Medical History:   Diagnosis Date    ADHD (attention deficit hyperactivity disorder)     Diabetes mellitus type 1 ([facility]     Endometriosis        Past Surgical History:  Past Surgical History:   Procedure Laterality Date    HX BACK SURGERY  5/23/2014    lower back     HX ORTHOPAEDIC  5/23/2014    right medial ankle        Family History:  Family History   Problem Relation Age of Onset   Jm Interiano Arthritis-rheumatoid Paternal [de-identified] Hypertension Mother     Diabetes Maternal Grandmother      type 2    Heart Disease Neg Hx        Social History:  Social History   Substance Use Topics    Smoking status: Former Smoker    Smokeless tobacco: Never Used    Alcohol use 0.0 oz/week     0 Standard drinks or equivalent per week      Comment: occasionally        Allergies:  No Known Allergies      Review of Systems     Review of Systems   Constitutional: Negative for fever. Gastrointestinal: Positive for abdominal pain. Genitourinary: Positive for vaginal bleeding. All other systems reviewed and are negative. Physical Exam     Visit Vitals    /71 (BP 1 Location: Left arm, BP Patient Position: At rest)    Pulse 83    Temp 98.3 °F (36.8 °C)    Resp 18    Ht 5' 2\" (1.575 m)    Wt 53.5 kg (118 lb)    SpO2 100%    BMI 21.58 kg/m2       Physical Exam   Constitutional: She is oriented to person, place, and time. She appears well-developed. HENT:   Head: Normocephalic and atraumatic. Eyes: EOM are normal. Pupils are equal, round, and reactive to light. Neck: Normal range of motion. Neck supple. Cardiovascular: Normal rate, regular rhythm and normal heart sounds. Exam reveals no friction rub. No murmur heard. Pulmonary/Chest: Effort normal and breath sounds normal. No respiratory distress. She has no wheezes. Abdominal: Soft. She exhibits no distension. There is no tenderness. There is no rebound and no guarding. Musculoskeletal: Normal range of motion. Neurological: She is alert and oriented to person, place, and time. Skin: Skin is warm and dry. Psychiatric: She has a normal mood and affect. Her behavior is normal. Thought content normal.         Diagnostic Study Results         Medical Decision Making     1.  DUB: us wnl , hcg neg H&H wnl;    3:18 PM pt refused Gyn exam; will d/c    Diagnosis     No diagnosis found. _______________________________    Attestations:  Scribe Attestation     Reji Keith acting as a scribe for and in the presence of Amrita Colindres MD      January 29, 2018 at 12:06 PM       Provider Attestation:      I personally performed the services described in the documentation, reviewed the documentation, as recorded by the scribe in my presence, and it accurately and completely records my words and actions.  January 29, 2018 at 12:06 PM - Amrita Colindres MD    _______________________________

## 2018-01-29 NOTE — LETTER
[address] [place] [address] [facility] EMERGENCY DEPT 
[address] [facility] [address] 
[phone] Work/School Note Date: 1/29/2018 To Whom It May concern: 
 
Luigi Galarza was seen and treated today in the emergency room by the following provider(s): 
Attending Provider: Kelsi Haider MD. Luigi Galarza may return to work on 1/29/2018.  
 
Sincerely, 
 
 
 
 
Kelsi Haider MD

## 2018-07-23 LAB — HBA1C MFR BLD HPLC: 8.7 %

## 2018-09-21 ENCOUNTER — OFFICE VISIT (OUTPATIENT)
Dept: FAMILY MEDICINE CLINIC | Age: 28
End: 2018-09-21

## 2018-09-21 VITALS
WEIGHT: 137 LBS | HEART RATE: 76 BPM | SYSTOLIC BLOOD PRESSURE: 99 MMHG | HEIGHT: 62 IN | TEMPERATURE: 97.6 F | RESPIRATION RATE: 20 BRPM | DIASTOLIC BLOOD PRESSURE: 53 MMHG | OXYGEN SATURATION: 99 % | BODY MASS INDEX: 25.21 KG/M2

## 2018-09-21 DIAGNOSIS — F90.9 ATTENTION DEFICIT HYPERACTIVITY DISORDER (ADHD), UNSPECIFIED ADHD TYPE: Primary | ICD-10-CM

## 2018-09-21 RX ORDER — IBUPROFEN 800 MG/1
TABLET ORAL
COMMUNITY
End: 2019-01-09 | Stop reason: SDUPTHER

## 2018-09-21 NOTE — PROGRESS NOTES
HISTORY OF PRESENT ILLNESS    Brisa Clifton is a 33y.o. year old female comes in today to be evaluated and treated for:  ADHD    Patient reports she has a new job. She is currently working as an  and finds it's hard to stay focus with having being responsible for multiple task. States she did have a recent drug screen for her employer a couple of weeks ago. States she has not taken Adderall in several years. No Known Allergies  Current Outpatient Prescriptions   Medication Sig Dispense Refill    ibuprofen (MOTRIN) 800 mg tablet Take  by mouth.   insulin pump (PATIENT SUPPLIED) misc by SubCUTAneous route as needed.  insulin lispro (HUMALOG) 100 unit/mL injection by SubCUTAneous route. Past Medical History:   Diagnosis Date    ADHD (attention deficit hyperactivity disorder)     Diabetes mellitus type 1 (HCC)     Endometriosis        ROS:  Review of Systems - General ROS: negative  Respiratory ROS: no cough, shortness of breath, or wheezing  Cardiovascular ROS: no chest pain or dyspnea on exertion  Gastrointestinal ROS: no abdominal pain, change in bowel habits, or black or bloody stools  Neurological ROS: negative for - dizziness or headaches        Objective:  Visit Vitals    BP 99/53 (BP 1 Location: Left arm, BP Patient Position: Sitting)    Pulse 76    Temp 97.6 °F (36.4 °C) (Oral)    Resp 20    Ht 5' 2\" (1.575 m)    Wt 137 lb (62.1 kg)    LMP 09/21/2018    SpO2 99%    BMI 25.06 kg/m2     General appearance - alert, well appearing, and in no distress  Neck - supple, no significant adenopathy  Chest - clear to auscultation, no wheezes, rales or rhonchi, symmetric air entry  Heart - normal rate, regular rhythm, normal S1, S2, no murmurs, rubs, clicks or gallops  Extremities: extremities normal, atraumatic, no cyanosis or edema          Assessment/Plan:     ICD-10-CM ICD-9-CM    1.  Attention deficit hyperactivity disorder (ADHD), unspecified ADHD type F90.9 314.01 release obtained to receive recent UDS from [facility].  I have discussed the diagnosis with the patient and the intended plan as seen in the above orders. The patient has received an after-visit summary and questions were answered concerning future plans. I have discussed medication side effects and warnings with the patient as well. Patient agreeable with above plan and verbalizes understanding. Follow-up Disposition:  Return in about 4 weeks (around 10/19/2018) for ADHD.

## 2018-09-21 NOTE — PATIENT INSTRUCTIONS

## 2018-09-21 NOTE — PROGRESS NOTES
Chief Complaint   Patient presents with    Behavioral Problem     ADHD     1. Have you been to the ER, urgent care clinic since your last visit? Hospitalized since your last visit? No    2. Have you seen or consulted any other health care providers outside of the [address] [place] [address] since your last visit? Include any pap smears or colon screening.  No

## 2018-09-21 NOTE — MR AVS SNAPSHOT
[address] [facility] 
 
 
 [address] [address] [address] [address] 
[phone] Patient: Marky Chun MRN: [id number] SFR:[birth year] Visit Information Date & Time Provider Department Dept. Phone Encounter #  
 9/21/2018  7:20 AM Jovan Mcelroy [address] [place] [phone] [id number] Follow-up Instructions Return in about 4 weeks (around 10/19/2018) for ADHD. Upcoming Health Maintenance Date Due  
 FOOT EXAM Q1 2/18/2001 MICROALBUMIN Q1 2/18/2001 EYE EXAM RETINAL OR DILATED Q1 2/18/2001 Pneumococcal 19-64 Medium Risk (1 of 1 - PPSV23) 2/18/2010 DTaP/Tdap/Td series (1 - Tdap) 2/18/2012 PAP AKA CERVICAL CYTOLOGY 2/18/2012 HEMOGLOBIN A1C Q6M 3/7/2017 LIPID PANEL Q1 9/7/2017 Influenza Age 5 to Adult 8/1/2018 Allergies as of 9/21/2018  Review Complete On: 9/21/2018 By: Soumya Alfaro NP No Known Allergies Current Immunizations  Never Reviewed No immunizations on file. Not reviewed this visit You Were Diagnosed With   
  
 Codes Comments Attention deficit hyperactivity disorder (ADHD), unspecified ADHD type    -  Primary ICD-10-CM: F90.9 ICD-9-CM: 314.01 Vitals BP Pulse Temp Resp Height(growth percentile) Weight(growth percentile) 99/53 (BP 1 Location: Left arm, BP Patient Position: Sitting) 76 97.6 °F (36.4 °C) (Oral) 20 5' 2\" (1.575 m) 137 lb (62.1 kg) LMP SpO2 BMI OB Status Smoking Status 09/21/2018 99% 25.06 kg/m2 Having regular periods Former Smoker BMI and BSA Data Body Mass Index Body Surface Area 25.06 kg/m 2 1.65 m 2 Preferred Pharmacy Pharmacy Name Phone [id number] [address] [phone] Your Updated Medication List  
  
   
This list is accurate as of 9/21/18  8:00 AM.  Always use your most recent med list.  
  
  
  
  
 HumaLOG U-100 Insulin 100 unit/mL injection Generic drug:  insulin lispro  
by SubCUTAneous route. ibuprofen 800 mg tablet Commonly known as:  MOTRIN Take  by mouth. insulin pump Misc Commonly known as:  PATIENT SUPPLIED  
by SubCUTAneous route as needed. Follow-up Instructions Return in about 4 weeks (around 10/19/2018) for ADHD. Patient Instructions Learning About Attention Deficit Hyperactivity Disorder (ADHD) in Adults What is ADHD? Attention deficit hyperactivity disorder (ADHD) is a condition in which people have a hard time paying attention. Adults with ADHD also may be more active than normal. They tend to act without thinking. ADHD may make it harder for them to focus, get organized, and finish tasks. ADHD most often starts in childhood and lasts into adulthood. Many adults don't know that they have ADHD until their children are diagnosed. Then they begin to see their own symptoms. Doctors don't know what causes ADHD. But it tends to run in families. What are the symptoms? The most common types of ADHD symptoms in adults are attention problems and hyperactivity. Attention problems Adults with ADHD often find it hard to: · Finish tasks that don't interest them or aren't easy. But they may become obsessed with activities that they find interesting and enjoy. · Keep relationships. · Focus their attention on conversations, reading materials, or jobs. They may change jobs a lot. · Remember things. They may misplace or lose things. · Pay attention. They are easily distracted. They find it hard to focus on one task. · Think before they act. They may make quick decisions. They may act before they think about the effect of their actions. Hyperactivity Adults with ADHD may: · Fidget. They may swing their legs, shift in their seats, or tap their fingers. · Move around a lot. They may feel \"revved up\" or on the go. They may not be able to slow down until they are very tired. · Find it hard to relax. They may feel restless and find it hard to do quiet things like read or watch TV. How does ADHD affect daily life? ADHD in adults may affect: · Job performance. They may find it hard to organize their work, manage their time, and focus on one task at a time. They may forget, misplace, or lose things. They may quit their jobs out of boredom. · Relationships. Adults with ADHD may find it hard to focus their attention on conversations. It is hard for them to \"read\" the behavior and moods of others and express their own feelings. · Temper. They may get easily frustrated. This often can make it harder for them to deal with stress. These adults may overreact and have a short, quick temper. · The ability to solve problems. Adults who have a hard time waiting for things they want may act before they think about the effect of their actions. They may take part in risky behaviors. These include unprotected sex, unsafe driving, alcohol and drug use, or unwise business ventures. How is ADHD treated? 
 ADHD can be treated with medicines, behavior training, or counseling. Or it may be a combination of these treatments. Medicines 
 Stimulant medicines are most often used to treat ADHD. These may include: 
  · Amphetamines (such as Adderall and Dexedrine).  
  · Methylphenidate (such as Concerta, Daytrana, Focalin, Metadate, and Ritalin).  
 Other medicines that may be used are: 
  · Atomoxetine, such as Strattera, a nonstimulant medicine for ADHD.  
  · Antihypertensives. These include clonidine (such as Catapres) and guanfacine (such as Tenex).   · Antidepressants, which include bupropion (Wellbutrin).  
[employer] training can help adults with ADHD learn how to: 
  · Get organized. A daily organizer or planner can help these adults organize their daily tasks. They can write down appointments and other things they need to remember.   · Decrease distractions. They can set up their work or home environment so that there are fewer things that will distract them. They may find using headphones or a \"white noise\" machine helpful. College students can arrange a quiet living situation. They may need a single dorm room.  
  · Work on relationships. Social skills training can help adults with ADHD relate to family, friends, and coworkers. Couples counseling or family therapy can also help improve relationships.  
Avery Woodsaliciai is not meant to treat inattention, hyperactivity, or impulsiveness. But it can help with some of the problems that go along with ADHD. These include not getting along well with others and having problems following rules. Where can you learn more? Go to http://tavon-christianne.info/. Enter O442 in the search box to learn more about \"Learning About Attention Deficit Hyperactivity Disorder (ADHD) in Adults. \" Current as of: December 7, 2017 Content Version: 11.7 © 1885-3356 [employer]. Care instructions adapted under license by [employer] (which disclaims liability or warranty for this information). If you have questions about a medical condition or this instruction, always ask your healthcare professional. [address] [address] any warranty or liability for your use of this information. Introducing \[facility]\"" & HEALTH SERVICES! [employer] introduces [employer] patient portal. Now you can access parts of your medical record, email your doctor's office, and request medication refills online. 1. In your internet browser, go to [email or web address] 2. Click on the First Time User? Click Here link in the Sign In box. You will see the New Member Sign Up page. 3. Enter your [employer] Access Code exactly as it appears below. You will not need to use this code after youve completed the sign-up process.  If you do not sign up before the expiration date, you must request a new code. · anydooR Access Code: [email or web address] Expires: 12/20/2018  8:00 AM 
 
4. Enter the last four digits of your Social Security Number (xxxx) and Date of Birth (mm/dd/yyyy) as indicated and click Submit. You will be taken to the next sign-up page. 5. Create a anydooR ID. This will be your anydooR login ID and cannot be changed, so think of one that is secure and easy to remember. 6. Create a anydooR password. You can change your password at any time. 7. Enter your Password Reset Question and Answer. This can be used at a later time if you forget your password. 8. Enter your e-mail address. You will receive e-mail notification when new information is available in [address]. 9. Click Sign Up. You can now view and download portions of your medical record. 10. Click the Download Summary menu link to download a portable copy of your medical information. If you have questions, please visit the Frequently Asked Questions section of the anydooR website. Remember, anydooR is NOT to be used for urgent needs. For medical emergencies, dial 911. Now available from your iPhone and Android! Please provide this summary of care documentation to your next provider. Your primary care clinician is listed as Belkis Wong. If you have any questions after today's visit, please call [phone].

## 2018-10-03 ENCOUNTER — TELEPHONE (OUTPATIENT)
Dept: FAMILY MEDICINE CLINIC | Age: 28
End: 2018-10-03

## 2018-10-03 NOTE — TELEPHONE ENCOUNTER
Patient contacted the office about forms that she dropped off for [facility]. I spoke with NP-VALADEZ she stated that the forms are on her desk and she would work on it in between patients today. I made patient aware and patient stated she would like to be contacted upon completion.

## 2018-10-04 DIAGNOSIS — F98.8 ATTENTION DEFICIT DISORDER, UNSPECIFIED HYPERACTIVITY PRESENCE: Primary | ICD-10-CM

## 2018-10-04 RX ORDER — DEXTROAMPHETAMINE SACCHARATE, AMPHETAMINE ASPARTATE, DEXTROAMPHETAMINE SULFATE AND AMPHETAMINE SULFATE 2.5; 2.5; 2.5; 2.5 MG/1; MG/1; MG/1; MG/1
10 TABLET ORAL 2 TIMES DAILY
Qty: 60 TAB | Refills: 0 | Status: SHIPPED | OUTPATIENT
Start: 2018-10-04 | End: 2018-10-30 | Stop reason: SDUPTHER

## 2018-10-30 DIAGNOSIS — F98.8 ATTENTION DEFICIT DISORDER, UNSPECIFIED HYPERACTIVITY PRESENCE: ICD-10-CM

## 2018-10-30 RX ORDER — DEXTROAMPHETAMINE SACCHARATE, AMPHETAMINE ASPARTATE, DEXTROAMPHETAMINE SULFATE AND AMPHETAMINE SULFATE 2.5; 2.5; 2.5; 2.5 MG/1; MG/1; MG/1; MG/1
10 TABLET ORAL 2 TIMES DAILY
Qty: 60 TAB | Refills: 0 | Status: SHIPPED | OUTPATIENT
Start: 2018-11-10 | End: 2018-12-07 | Stop reason: SDUPTHER

## 2018-10-30 NOTE — TELEPHONE ENCOUNTER
Please advise patient she will need to schedule a follow up prior to her next refill. Thanks, BILL Art   I have reviewed the patients controlled substance prescription history, as maintained in the [facility]. There is no suspicious activity noted. Usage is consistent with current use of prescribed medications.

## 2018-10-30 NOTE — TELEPHONE ENCOUNTER
Requested Prescriptions     Pending Prescriptions Disp Refills    dextroamphetamine-amphetamine (ADDERALL) 10 mg tablet 60 Tab 0     Sig: Take 1 Tab (10 mg total) by mouth two (2) times a day.   Max Daily Amount: 20 mg

## 2018-12-07 DIAGNOSIS — F98.8 ATTENTION DEFICIT DISORDER, UNSPECIFIED HYPERACTIVITY PRESENCE: ICD-10-CM

## 2018-12-07 RX ORDER — DEXTROAMPHETAMINE SACCHARATE, AMPHETAMINE ASPARTATE, DEXTROAMPHETAMINE SULFATE AND AMPHETAMINE SULFATE 2.5; 2.5; 2.5; 2.5 MG/1; MG/1; MG/1; MG/1
10 TABLET ORAL 2 TIMES DAILY
Qty: 60 TAB | Refills: 0 | Status: SHIPPED | OUTPATIENT
Start: 2018-12-07 | End: 2019-01-09 | Stop reason: SDUPTHER

## 2018-12-07 NOTE — TELEPHONE ENCOUNTER
I have reviewed the patients controlled substance prescription history, as maintained in the [facility]. There is no suspicious activity noted. Usage is consistent with current use of prescribed medications.

## 2019-01-09 ENCOUNTER — TELEPHONE (OUTPATIENT)
Dept: FAMILY MEDICINE CLINIC | Age: 29
End: 2019-01-09

## 2019-01-09 ENCOUNTER — OFFICE VISIT (OUTPATIENT)
Dept: FAMILY MEDICINE CLINIC | Age: 29
End: 2019-01-09

## 2019-01-09 VITALS
HEART RATE: 85 BPM | BODY MASS INDEX: 23.89 KG/M2 | HEIGHT: 62 IN | WEIGHT: 129.8 LBS | TEMPERATURE: 97.7 F | RESPIRATION RATE: 16 BRPM | DIASTOLIC BLOOD PRESSURE: 74 MMHG | SYSTOLIC BLOOD PRESSURE: 116 MMHG | OXYGEN SATURATION: 97 %

## 2019-01-09 DIAGNOSIS — K21.9 GASTROESOPHAGEAL REFLUX DISEASE, ESOPHAGITIS PRESENCE NOT SPECIFIED: ICD-10-CM

## 2019-01-09 DIAGNOSIS — Z98.890 HISTORY OF BACK SURGERY: ICD-10-CM

## 2019-01-09 DIAGNOSIS — E11.9 DIABETIC EYE EXAM (HCC): ICD-10-CM

## 2019-01-09 DIAGNOSIS — F98.8 ATTENTION DEFICIT DISORDER, UNSPECIFIED HYPERACTIVITY PRESENCE: Primary | ICD-10-CM

## 2019-01-09 DIAGNOSIS — Z01.00 DIABETIC EYE EXAM (HCC): ICD-10-CM

## 2019-01-09 DIAGNOSIS — Z98.890 HISTORY OF ANKLE SURGERY: ICD-10-CM

## 2019-01-09 DIAGNOSIS — F98.8 ATTENTION DEFICIT DISORDER, UNSPECIFIED HYPERACTIVITY PRESENCE: ICD-10-CM

## 2019-01-09 RX ORDER — OMEPRAZOLE 20 MG/1
20 CAPSULE, DELAYED RELEASE ORAL
Qty: 30 CAP | Refills: 2 | Status: SHIPPED | OUTPATIENT
Start: 2019-01-09 | End: 2019-05-17

## 2019-01-09 RX ORDER — DEXTROAMPHETAMINE SACCHARATE, AMPHETAMINE ASPARTATE, DEXTROAMPHETAMINE SULFATE AND AMPHETAMINE SULFATE 2.5; 2.5; 2.5; 2.5 MG/1; MG/1; MG/1; MG/1
TABLET ORAL
Qty: 30 TAB | Refills: 0 | Status: SHIPPED | OUTPATIENT
Start: 2019-01-09 | End: 2019-01-09

## 2019-01-09 RX ORDER — IBUPROFEN 800 MG/1
800 TABLET ORAL
Qty: 90 TAB | Refills: 1 | Status: SHIPPED | OUTPATIENT
Start: 2019-01-09 | End: 2019-10-16 | Stop reason: SDUPTHER

## 2019-01-09 RX ORDER — DEXTROAMPHETAMINE SACCHARATE, AMPHETAMINE ASPARTATE MONOHYDRATE, DEXTROAMPHETAMINE SULFATE AND AMPHETAMINE SULFATE 3.75; 3.75; 3.75; 3.75 MG/1; MG/1; MG/1; MG/1
15 CAPSULE, EXTENDED RELEASE ORAL
Qty: 30 CAP | Refills: 0 | Status: SHIPPED | OUTPATIENT
Start: 2019-01-09 | End: 2019-01-09

## 2019-01-09 NOTE — PROGRESS NOTES
Pt is here for f/u on ADD. Pt c/o heartburn x 6 months. 1. Have you been to the ER, urgent care clinic since your last visit? Hospitalized since your last visit? No    2. Have you seen or consulted any other health care providers outside of the [address] [place] [address] since your last visit? Include any pap smears or colon screening.  No

## 2019-01-09 NOTE — TELEPHONE ENCOUNTER
Patient states she was given the script for the extended release Adderal but it is too expensive. She states he was told that she could get a script for the regular Adderal 10mg and take it three times a day.
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
## 2019-01-09 NOTE — PATIENT INSTRUCTIONS

## 2019-01-09 NOTE — TELEPHONE ENCOUNTER
Pt calling re new adderall RX.     Pt made aware per Rik Grad the adderall prescriptions written will need to be turned in here and then she will write the new requested prescription    Pt verbalized understanding

## 2019-01-10 RX ORDER — DEXTROAMPHETAMINE SACCHARATE, AMPHETAMINE ASPARTATE, DEXTROAMPHETAMINE SULFATE AND AMPHETAMINE SULFATE 2.5; 2.5; 2.5; 2.5 MG/1; MG/1; MG/1; MG/1
10 TABLET ORAL 3 TIMES DAILY
Qty: 90 TAB | Refills: 0 | Status: SHIPPED | OUTPATIENT
Start: 2019-01-10 | End: 2019-02-14 | Stop reason: SDUPTHER

## 2019-02-04 LAB — HBA1C MFR BLD HPLC: 8.7 %

## 2019-02-14 ENCOUNTER — OFFICE VISIT (OUTPATIENT)
Dept: FAMILY MEDICINE CLINIC | Age: 29
End: 2019-02-14

## 2019-02-14 VITALS
TEMPERATURE: 97.9 F | DIASTOLIC BLOOD PRESSURE: 68 MMHG | WEIGHT: 125.8 LBS | RESPIRATION RATE: 16 BRPM | HEART RATE: 94 BPM | OXYGEN SATURATION: 98 % | SYSTOLIC BLOOD PRESSURE: 117 MMHG | BODY MASS INDEX: 23.15 KG/M2 | HEIGHT: 62 IN

## 2019-02-14 DIAGNOSIS — F98.8 ATTENTION DEFICIT DISORDER, UNSPECIFIED HYPERACTIVITY PRESENCE: Primary | ICD-10-CM

## 2019-02-14 DIAGNOSIS — Z01.84 IMMUNITY STATUS TESTING: ICD-10-CM

## 2019-02-14 DIAGNOSIS — K21.9 GASTROESOPHAGEAL REFLUX DISEASE, ESOPHAGITIS PRESENCE NOT SPECIFIED: ICD-10-CM

## 2019-02-14 RX ORDER — DEXTROAMPHETAMINE SACCHARATE, AMPHETAMINE ASPARTATE, DEXTROAMPHETAMINE SULFATE AND AMPHETAMINE SULFATE 2.5; 2.5; 2.5; 2.5 MG/1; MG/1; MG/1; MG/1
10 TABLET ORAL 3 TIMES DAILY
Qty: 90 TAB | Refills: 0 | Status: SHIPPED | OUTPATIENT
Start: 2019-02-14 | End: 2019-03-15 | Stop reason: SDUPTHER

## 2019-02-14 NOTE — PATIENT INSTRUCTIONS

## 2019-02-14 NOTE — PROGRESS NOTES
Subjective:   Shelton Contreras is a 33 y.o. female here today for 4 week(s) follow up on Attention Deficit Hyperactivity Disorder. Patient states they are well controlled on current medication Adderall all of the time. Denies any adverse effects. Medication was increased to Adderall 10 mg 3 times per day. Attention Deficit Hyperactivity Disorder ROS: denies heart palpitations, denies insomnia, denies anorexia. New concerns: reported symptoms of GERD during last visit. Began on omeprazole 20 mg daily. States she was not able get omeprazole due to insurance trouble. States she has made dietary changes and also used otc medication with improvement. States she would like to have her titers checked for immunity to past vaccinations. Current Outpatient Medications   Medication Sig Dispense Refill    dextroamphetamine-amphetamine (ADDERALL) 10 mg tablet Take 1 Tab (10 mg total) by mouth three (3) times dailyEarliest Fill Date: 1/10/19. Max Daily Amount: 30 mg 90 Tab 0    ibuprofen (MOTRIN) 800 mg tablet Take 1 Tab by mouth every eight (8) hours as needed for Pain (take with food or milk). 90 Tab 1    omeprazole (PRILOSEC) 20 mg capsule Take 1 Cap by mouth Daily (before breakfast). 30 Cap 2     insulin pump (PATIENT SUPPLIED) misc by SubCUTAneous route as needed.  insulin lispro (HUMALOG) 100 unit/mL injection by SubCUTAneous route.           Patient Active Problem List   Diagnosis Code    [facility]     Lab Results   Component Value Date/Time    Cholesterol, total 199 (H) 06/12/2015 10:40 AM    HDL Cholesterol 90 06/12/2015 10:40 AM    LDL, calculated 101 06/12/2015 10:40 AM    Triglyceride 42 06/12/2015 10:40 AM     Lab Results   Component Value Date/Time    Sodium 138 01/29/2018 12:15 PM    Potassium 4.2 01/29/2018 12:15 PM    Chloride 102 01/29/2018 12:15 PM    CO2 29 01/29/2018 12:15 PM    Anion gap 7 01/29/2018 12:15 PM    Glucose 295 (H) 01/29/2018 12:15 PM BUN 10 01/29/2018 12:15 PM    Creatinine 0.62 01/29/2018 12:15 PM    BUN/Creatinine ratio 16 01/29/2018 12:15 PM    GFR est AA >60 01/29/2018 12:15 PM    GFR est non-AA >60 01/29/2018 12:15 PM    Calcium 8.9 01/29/2018 12:15 PM    Bilirubin, total 1.0 01/29/2018 12:15 PM    AST (SGOT) 9 (L) 01/29/2018 12:15 PM    Alk. phosphatase 41 (L) 01/29/2018 12:15 PM    Protein, total 7.3 01/29/2018 12:15 PM    Albumin 4.0 01/29/2018 12:15 PM    Globulin 3.3 01/29/2018 12:15 PM    A-G Ratio 1.2 01/29/2018 12:15 PM    ALT (SGPT) 19 01/29/2018 12:15 PM     Lab Results   Component Value Date/Time    WBC 8.5 01/29/2018 12:15 PM    HGB 14.4 01/29/2018 12:15 PM    HCT 42.4 01/29/2018 12:15 PM    PLATELET 435 71/13/3522 12:15 PM    MCV 94.4 01/29/2018 12:15 PM     Wt Readings from Last 3 Encounters:   01/09/19 129 lb 12.8 oz (58.9 kg)   09/21/18 137 lb (62.1 kg)   01/29/18 118 lb (53.5 kg)     BP Readings from Last 3 Encounters:   01/09/19 116/74   09/21/18 99/53   01/29/18 114/71       Objective:   Visit Vitals  /68 (BP 1 Location: Left arm)   Pulse 94   Temp 97.9 °F (36.6 °C) (Oral)   Resp 16   Ht 5' 2\" (1.575 m)   Wt 125 lb 12.8 oz (57.1 kg)   LMP 01/19/2019 (Exact Date)   SpO2 98%   BMI 23.01 kg/m²     General appearance - alert, well appearing, and in no distress  Neck - supple, no significant adenopathy  Chest - clear to auscultation, no wheezes, rales or rhonchi, symmetric air entry  Heart - normal rate, regular rhythm, normal S1, S2, no murmurs, rubs, clicks or gallops  Extremities - peripheral pulses normal, no pedal edema, no clubbing or cyanosis  Abdomen - soft, nontender, nondistended, no masses or organomegaly        Assessment/Plan:    ICD-10-CM ICD-9-CM    1. Attention deficit disorder, unspecified hyperactivity presence F98.8 314.00 dextroamphetamine-amphetamine (ADDERALL) 10 mg tablet   2. Gastroesophageal reflux disease, esophagitis presence not specified K21.9 530.81    3.  Immunity status testing Z01.84 V72.61 B. PERTUSSIS AB, IGG      MUMPS AB, IGG      POLIOVIRUS AB      RUBELLA AB, IGG      RUBEOLA AB, IGG      VARICELLA ZOSTER ABS, IGG/IGM      HEP B SURFACE AB   informed may return during open lab hours to labs drawn to test of immunity  I have reviewed the patients controlled substance prescription history, as maintained in the [facility]. There is no suspicious activity noted. Usage is consistent with current use of prescribed medications. I have discussed the diagnosis with the patient and the intended plan as seen in the above orders. The patient has received an after-visit summary and questions were answered concerning future plans. I have discussed medication side effects and warnings with the patient as well. Patient agreeable with above plan and verbalizes understanding. Follow-up Disposition:  Return in about 3 months (around 5/14/2019) for ADD.

## 2019-02-14 NOTE — PROGRESS NOTES
Pt is here for 4 week f/u GERD & ADD  Did not get RX for GERD  Using OTC med    1. Have you been to the ER, urgent care clinic since your last visit? Hospitalized since your last visit? No    2. Have you seen or consulted any other health care providers outside of the [address] [place] [address] since your last visit? Include any pap smears or colon screening.  No

## 2019-03-15 DIAGNOSIS — F98.8 ATTENTION DEFICIT DISORDER, UNSPECIFIED HYPERACTIVITY PRESENCE: ICD-10-CM

## 2019-03-15 RX ORDER — DEXTROAMPHETAMINE SACCHARATE, AMPHETAMINE ASPARTATE, DEXTROAMPHETAMINE SULFATE AND AMPHETAMINE SULFATE 2.5; 2.5; 2.5; 2.5 MG/1; MG/1; MG/1; MG/1
10 TABLET ORAL 3 TIMES DAILY
Qty: 90 TAB | Refills: 0 | Status: SHIPPED | OUTPATIENT
Start: 2019-03-15 | End: 2019-04-10 | Stop reason: SDUPTHER

## 2019-03-15 NOTE — TELEPHONE ENCOUNTER
Requested Prescriptions     Pending Prescriptions Disp Refills    dextroamphetamine-amphetamine (ADDERALL) 10 mg tablet 90 Tab 0     Sig: Take 1 Tab (10 mg total) by mouth three (3) times daily.   Max Daily Amount: 30 mg

## 2019-03-26 DIAGNOSIS — F98.8 ATTENTION DEFICIT DISORDER, UNSPECIFIED HYPERACTIVITY PRESENCE: ICD-10-CM

## 2019-03-26 RX ORDER — DEXTROAMPHETAMINE SACCHARATE, AMPHETAMINE ASPARTATE, DEXTROAMPHETAMINE SULFATE AND AMPHETAMINE SULFATE 2.5; 2.5; 2.5; 2.5 MG/1; MG/1; MG/1; MG/1
10 TABLET ORAL 3 TIMES DAILY
Qty: 90 TAB | Refills: 0 | Status: CANCELLED | OUTPATIENT
Start: 2019-03-26

## 2019-03-29 ENCOUNTER — TELEPHONE (OUTPATIENT)
Dept: FAMILY MEDICINE CLINIC | Age: 29
End: 2019-03-29

## 2019-03-29 NOTE — TELEPHONE ENCOUNTER
Pt was not referred to [facility] by MARIELENA Talley. Alexsandra is aware Pt did a self referral & that she was being seen by [id number] [address]. She states she will contact Pt to get her to request office note & labs from that practice as even though we have some note we are not able to sent them.

## 2019-03-29 NOTE — TELEPHONE ENCOUNTER
Vadim Woods with [facility] calling for last notes and labs.      Stated pt was referred by our office, ALEKSANDR Spann for diabetes    Ph [phone]  Fax [phone]

## 2019-04-01 LAB
CREATININE, EXTERNAL: 156
MICROALBUMIN UR TEST STR-MCNC: 242.9 MG/DL

## 2019-04-10 DIAGNOSIS — F98.8 ATTENTION DEFICIT DISORDER, UNSPECIFIED HYPERACTIVITY PRESENCE: ICD-10-CM

## 2019-04-10 RX ORDER — DEXTROAMPHETAMINE SACCHARATE, AMPHETAMINE ASPARTATE, DEXTROAMPHETAMINE SULFATE AND AMPHETAMINE SULFATE 2.5; 2.5; 2.5; 2.5 MG/1; MG/1; MG/1; MG/1
10 TABLET ORAL 3 TIMES DAILY
Qty: 90 TAB | Refills: 0 | Status: SHIPPED | OUTPATIENT
Start: 2019-04-13 | End: 2019-05-17 | Stop reason: SDUPTHER

## 2019-04-10 NOTE — TELEPHONE ENCOUNTER
Requested Prescriptions     Pending Prescriptions Disp Refills    dextroamphetamine-amphetamine (ADDERALL) 10 mg tablet 90 Tab 0     Sig: Take 1 Tab by mouth three (3) times daily. Max Daily Amount: 30 mg. Patient would like medication to go to [facility]. She is requesting early because it will take a while to get to her from the mail order.

## 2019-04-11 ENCOUNTER — TELEPHONE (OUTPATIENT)
Dept: FAMILY MEDICINE CLINIC | Age: 29
End: 2019-04-11

## 2019-04-11 NOTE — TELEPHONE ENCOUNTER
Pt came up to the office today and is requesting a return call re: PA for adderall     She said per [facility] today they have not received the prior auth    Pt said she talked to our office with Yvonne Hernandez last month to try and made sure Adderall would be approved timely.  Said she was told last month by [facility] a PA would be needed each month from now on     She provided direct ph line for [phone]

## 2019-05-17 ENCOUNTER — OFFICE VISIT (OUTPATIENT)
Dept: FAMILY MEDICINE CLINIC | Age: 29
End: 2019-05-17

## 2019-05-17 VITALS
DIASTOLIC BLOOD PRESSURE: 77 MMHG | BODY MASS INDEX: 23.19 KG/M2 | SYSTOLIC BLOOD PRESSURE: 119 MMHG | RESPIRATION RATE: 20 BRPM | HEIGHT: 62 IN | OXYGEN SATURATION: 98 % | HEART RATE: 100 BPM | TEMPERATURE: 98.1 F | WEIGHT: 126 LBS

## 2019-05-17 DIAGNOSIS — F90.9 ATTENTION DEFICIT HYPERACTIVITY DISORDER (ADHD), UNSPECIFIED ADHD TYPE: Primary | ICD-10-CM

## 2019-05-17 RX ORDER — DEXTROAMPHETAMINE SACCHARATE, AMPHETAMINE ASPARTATE, DEXTROAMPHETAMINE SULFATE AND AMPHETAMINE SULFATE 2.5; 2.5; 2.5; 2.5 MG/1; MG/1; MG/1; MG/1
10 TABLET ORAL 3 TIMES DAILY
Qty: 90 TAB | Refills: 0 | Status: SHIPPED | OUTPATIENT
Start: 2019-05-17 | End: 2019-06-18 | Stop reason: SDUPTHER

## 2019-05-17 NOTE — PROGRESS NOTES
Chief Complaint   Patient presents with    Behavioral Problem     1. Have you been to the ER, urgent care clinic since your last visit? Hospitalized since your last visit? Yes in march for DKA is followed by endocrinology    2. Have you seen or consulted any other health care providers outside of the [address] [place] [address] since your last visit? Include any pap smears or colon screening.  Followed by endocrinology  Okay to discontinue medications no longer taking per verbal order Saima Weeks NP-C

## 2019-05-17 NOTE — PROGRESS NOTES
Subjective:   Miguel Tsang is a 30 y.o. female here today for 3 month(s) follow up on Attention Deficit Hyperactivity Disorder. Patient states they are well controlled on current medication Adderall all of the time. Denies any adverse effects. Attention Deficit Hyperactivity Disorder ROS: denies heart palpitations, denies insomnia, denies anorexia. New concerns: patient was recently hospitalized for DKA in March 2019. States she feels much better. Reports [facility] and has been followed by them closely. Current Outpatient Medications   Medication Sig Dispense Refill    dextroamphetamine-amphetamine (ADDERALL) 10 mg tablet Take 1 Tab by mouth three (3) times daily. Max Daily Amount: 30 mg. 90 Tab 0    ibuprofen (MOTRIN) 800 mg tablet Take 1 Tab by mouth every eight (8) hours as needed for Pain (take with food or milk). 90 Tab 1     insulin pump (PATIENT SUPPLIED) misc by SubCUTAneous route as needed.  insulin lispro (HUMALOG) 100 unit/mL injection by SubCUTAneous route. Patient Active Problem List   Diagnosis Code    [facility]     Lab Results   Component Value Date/Time    Cholesterol, total 199 (H) 06/12/2015 10:40 AM    HDL Cholesterol 90 06/12/2015 10:40 AM    LDL, calculated 101 06/12/2015 10:40 AM    Triglyceride 42 06/12/2015 10:40 AM     Lab Results   Component Value Date/Time    Sodium 138 01/29/2018 12:15 PM    Potassium 4.2 01/29/2018 12:15 PM    Chloride 102 01/29/2018 12:15 PM    CO2 29 01/29/2018 12:15 PM    Anion gap 7 01/29/2018 12:15 PM    Glucose 295 (H) 01/29/2018 12:15 PM    BUN 10 01/29/2018 12:15 PM    Creatinine 0.62 01/29/2018 12:15 PM    BUN/Creatinine ratio 16 01/29/2018 12:15 PM    GFR est AA >60 01/29/2018 12:15 PM    GFR est non-AA >60 01/29/2018 12:15 PM    Calcium 8.9 01/29/2018 12:15 PM    Bilirubin, total 1.0 01/29/2018 12:15 PM    AST (SGOT) 9 (L) 01/29/2018 12:15 PM    Alk. phosphatase 41 (L) 01/29/2018 12:15 PM    Protein, total 7.3 01/29/2018 12:15 PM    Albumin 4.0 01/29/2018 12:15 PM    Globulin 3.3 01/29/2018 12:15 PM    A-G Ratio 1.2 01/29/2018 12:15 PM    ALT (SGPT) 19 01/29/2018 12:15 PM     Lab Results   Component Value Date/Time    WBC 8.5 01/29/2018 12:15 PM    HGB 14.4 01/29/2018 12:15 PM    HCT 42.4 01/29/2018 12:15 PM    PLATELET 985 83/83/3358 12:15 PM    MCV 94.4 01/29/2018 12:15 PM     Wt Readings from Last 3 Encounters:   05/17/19 126 lb (57.2 kg)   02/14/19 125 lb 12.8 oz (57.1 kg)   01/09/19 129 lb 12.8 oz (58.9 kg)     BP Readings from Last 3 Encounters:   05/17/19 119/77   02/14/19 117/68   01/09/19 116/74       Objective:   Visit Vitals  /77 (BP 1 Location: Left arm, BP Patient Position: Sitting)   Pulse 100   Temp 98.1 °F (36.7 °C) (Oral)   Resp 20   Ht 5' 2\" (1.575 m)   Wt 126 lb (57.2 kg)   LMP 05/14/2019   SpO2 98%   BMI 23.05 kg/m²     General appearance - alert, well appearing, and in no distress  Neck - supple, no significant adenopathy  Chest - clear to auscultation, no wheezes, rales or rhonchi, symmetric air entry  Heart - normal rate, regular rhythm, normal S1, S2, no murmurs, rubs, clicks or gallops  Extremities - peripheral pulses normal, no pedal edema, no clubbing or cyanosis      Assessment/Plan:    ICD-10-CM ICD-9-CM    1. Attention deficit hyperactivity disorder (ADHD), unspecified ADHD type F90.9 314.01 dextroamphetamine-amphetamine (ADDERALL) 10 mg tablet     I have reviewed the patients controlled substance prescription history, as maintained in the [facility]. There is no suspicious activity noted. Usage is consistent with current use of prescribed medications. Urine drug screen was not collected today. Last dosage of medication was today  ago. I have discussed the diagnosis with the patient and the intended plan as seen in the above orders.   The patient has received an after-visit summary and questions were answered concerning future plans. I have discussed medication side effects and warnings with the patient as well. Patient agreeable with above plan and verbalizes understanding. Follow-up and Dispositions    · Return in about 4 months (around 9/17/2019) for ADHD.

## 2019-06-18 DIAGNOSIS — F90.9 ATTENTION DEFICIT HYPERACTIVITY DISORDER (ADHD), UNSPECIFIED ADHD TYPE: ICD-10-CM

## 2019-06-18 RX ORDER — DEXTROAMPHETAMINE SACCHARATE, AMPHETAMINE ASPARTATE, DEXTROAMPHETAMINE SULFATE AND AMPHETAMINE SULFATE 2.5; 2.5; 2.5; 2.5 MG/1; MG/1; MG/1; MG/1
10 TABLET ORAL 3 TIMES DAILY
Qty: 90 TAB | Refills: 0 | Status: SHIPPED | OUTPATIENT
Start: 2019-06-18 | End: 2019-07-18 | Stop reason: SDUPTHER

## 2019-06-18 NOTE — TELEPHONE ENCOUNTER
Requested Prescriptions     Pending Prescriptions Disp Refills    dextroamphetamine-amphetamine (ADDERALL) 10 mg tablet 90 Tab 0     Sig: Take 1 Tab by mouth three (3) times daily. Max Daily Amount: 30 mg.

## 2019-06-18 NOTE — TELEPHONE ENCOUNTER
I have reviewed the patients controlled substance prescription history, as maintained in the [facility]. There is no suspicious activity noted. Usage is consistent with current use of prescribed medications.

## 2019-07-18 DIAGNOSIS — F90.9 ATTENTION DEFICIT HYPERACTIVITY DISORDER (ADHD), UNSPECIFIED ADHD TYPE: ICD-10-CM

## 2019-07-18 RX ORDER — DEXTROAMPHETAMINE SACCHARATE, AMPHETAMINE ASPARTATE, DEXTROAMPHETAMINE SULFATE AND AMPHETAMINE SULFATE 2.5; 2.5; 2.5; 2.5 MG/1; MG/1; MG/1; MG/1
10 TABLET ORAL 3 TIMES DAILY
Qty: 90 TAB | Refills: 0 | Status: SHIPPED | OUTPATIENT
Start: 2019-07-18 | End: 2019-08-16 | Stop reason: SDUPTHER

## 2019-07-18 NOTE — TELEPHONE ENCOUNTER
I have reviewed the patients controlled substance prescription history, as maintained in the [facility]. There is no suspicious activity noted. Usage is consistent with current use of prescribed medications. Please advise patient rx is ready for . Thanks!

## 2019-08-08 LAB — HBA1C MFR BLD HPLC: 9.9 %

## 2019-10-16 ENCOUNTER — OFFICE VISIT (OUTPATIENT)
Dept: FAMILY MEDICINE CLINIC | Age: 29
End: 2019-10-16

## 2019-10-16 ENCOUNTER — HOSPITAL ENCOUNTER (OUTPATIENT)
Dept: LAB | Age: 29
Discharge: HOME | End: 2019-10-16
Payer: COMMERCIAL

## 2019-10-16 VITALS
BODY MASS INDEX: 22.82 KG/M2 | RESPIRATION RATE: 16 BRPM | DIASTOLIC BLOOD PRESSURE: 76 MMHG | TEMPERATURE: 98.2 F | OXYGEN SATURATION: 98 % | HEART RATE: 115 BPM | SYSTOLIC BLOOD PRESSURE: 119 MMHG | HEIGHT: 62 IN | WEIGHT: 124 LBS

## 2019-10-16 DIAGNOSIS — F90.9 ATTENTION DEFICIT HYPERACTIVITY DISORDER (ADHD), UNSPECIFIED ADHD TYPE: Primary | ICD-10-CM

## 2019-10-16 DIAGNOSIS — R30.0 DYSURIA: ICD-10-CM

## 2019-10-16 DIAGNOSIS — Z98.890 HISTORY OF BACK SURGERY: ICD-10-CM

## 2019-10-16 DIAGNOSIS — Z98.890 HISTORY OF ANKLE SURGERY: ICD-10-CM

## 2019-10-16 DIAGNOSIS — Z79.899 ENCOUNTER FOR LONG-TERM (CURRENT) USE OF HIGH-RISK MEDICATION: ICD-10-CM

## 2019-10-16 PROCEDURE — 87086 URINE CULTURE/COLONY COUNT: CPT

## 2019-10-16 PROCEDURE — 80307 DRUG TEST PRSMV CHEM ANLYZR: CPT

## 2019-10-16 RX ORDER — DEXTROAMPHETAMINE SACCHARATE, AMPHETAMINE ASPARTATE, DEXTROAMPHETAMINE SULFATE AND AMPHETAMINE SULFATE 2.5; 2.5; 2.5; 2.5 MG/1; MG/1; MG/1; MG/1
10 TABLET ORAL 3 TIMES DAILY
Qty: 90 TAB | Refills: 0 | Status: CANCELLED | OUTPATIENT
Start: 2019-10-16

## 2019-10-16 RX ORDER — DEXTROAMPHETAMINE SACCHARATE, AMPHETAMINE ASPARTATE MONOHYDRATE, DEXTROAMPHETAMINE SULFATE AND AMPHETAMINE SULFATE 5; 5; 5; 5 MG/1; MG/1; MG/1; MG/1
20 CAPSULE, EXTENDED RELEASE ORAL
Qty: 30 CAP | Refills: 0 | Status: SHIPPED | OUTPATIENT
Start: 2019-10-16 | End: 2019-11-14 | Stop reason: SDUPTHER

## 2019-10-16 RX ORDER — INSULIN GLARGINE 100 [IU]/ML
INJECTION, SOLUTION SUBCUTANEOUS
Refills: 3 | COMMUNITY
Start: 2019-09-19 | End: 2022-09-21

## 2019-10-16 RX ORDER — DEXTROAMPHETAMINE SACCHARATE, AMPHETAMINE ASPARTATE, DEXTROAMPHETAMINE SULFATE AND AMPHETAMINE SULFATE 2.5; 2.5; 2.5; 2.5 MG/1; MG/1; MG/1; MG/1
TABLET ORAL
Qty: 30 TAB | Refills: 0 | Status: SHIPPED | OUTPATIENT
Start: 2019-10-16 | End: 2019-11-19 | Stop reason: SDUPTHER

## 2019-10-16 RX ORDER — SULFAMETHOXAZOLE AND TRIMETHOPRIM 800; 160 MG/1; MG/1
1 TABLET ORAL 2 TIMES DAILY
Qty: 6 TAB | Refills: 0 | Status: SHIPPED | OUTPATIENT
Start: 2019-10-16 | End: 2019-10-19

## 2019-10-16 RX ORDER — IBUPROFEN 800 MG/1
800 TABLET ORAL
Qty: 90 TAB | Refills: 1 | Status: SHIPPED | OUTPATIENT
Start: 2019-10-16 | End: 2022-09-21

## 2019-10-16 NOTE — PATIENT INSTRUCTIONS
Painful Urination (Dysuria): Care Instructions  Your Care Instructions  Burning pain with urination (dysuria) is a common symptom of a urinary tract infection or other urinary problems. The bladder may become inflamed. This can cause pain when the bladder fills and empties. You may also feel pain if the tube that carries urine from the bladder to the outside of the body (urethra) gets irritated or infected. Sexually transmitted infections (STIs) also may cause pain when you urinate. Sometimes the pain can be caused by things other than an infection. The urethra can be irritated by soaps, perfumes, or foreign objects in the urethra. Kidney stones can cause pain when they pass through the urethra. The cause may be hard to find. You may need tests. Treatment for painful urination depends on the cause. Follow-up care is a key part of your treatment and safety. Be sure to make and go to all appointments, and call your doctor if you are having problems. It's also a good idea to know your test results and keep a list of the medicines you take. How can you care for yourself at home? · Drink extra water for the next day or two. This will help make the urine less concentrated. (If you have kidney, heart, or liver disease and have to limit fluids, talk with your doctor before you increase the amount of fluids you drink.)  · Avoid drinks that are carbonated or have caffeine. They can irritate the bladder. · Urinate often. Try to empty your bladder each time. For women:  · Urinate right after you have sex. · After going to the bathroom, wipe from front to back. · Avoid douches, bubble baths, and feminine hygiene sprays. And avoid other feminine hygiene products that have deodorants. When should you call for help? Call your doctor now or seek immediate medical care if:    · You have new symptoms, such as fever, nausea, or vomiting.     · You have new or worse symptoms of a urinary problem. For example:  ?  You have blood or pus in your urine. ? You have chills or body aches. ? It hurts worse to urinate. ? You have groin or belly pain. ? You have pain in your back just below your rib cage (the flank area).    Watch closely for changes in your health, and be sure to contact your doctor if you have any problems. Where can you learn more? Go to http://tavon-christianne.info/. Enter O833 in the search box to learn more about \"Painful Urination (Dysuria): Care Instructions. \"  Current as of: December 19, 2018  Content Version: 12.2  © 4432-2075 [employer], [employer]. Care instructions adapted under license by [employer] (which disclaims liability or warranty for this information). If you have questions about a medical condition or this instruction, always ask your healthcare professional. [address] any warranty or liability for your use of this information.

## 2019-10-16 NOTE — PROGRESS NOTES
Darin Chairez presents today for   Chief Complaint   Patient presents with    Medication Refill    Abdominal Pain       Is someone accompanying this pt? no    Is the patient using any DME equipment during OV? no    Depression Screening:  3 most recent PHQ Screens 5/17/2019   Little interest or pleasure in doing things Not at all   Feeling down, depressed, irritable, or hopeless Not at all   Total Score PHQ 2 0       Learning Assessment:  Learning Assessment 5/14/2015   PRIMARY LEARNER Patient   PRIMARY LANGUAGE ENGLISH   LEARNER PREFERENCE PRIMARY DEMONSTRATION     READING   ANSWERED BY Patient   RELATIONSHIP SELF       Abuse Screening:  Abuse Screening Questionnaire 5/17/2019   Do you ever feel afraid of your partner? N   Are you in a relationship with someone who physically or mentally threatens you? N   Is it safe for you to go home? Y       Fall Risk  No flowsheet data found. Health Maintenance reviewed and discussed and ordered per Provider. Health Maintenance Due   Topic Date Due    Pneumococcal 0-64 years (1 of 1 - PPSV23) 02/18/1997    EYE EXAM RETINAL OR DILATED  02/18/2001    DTaP/Tdap/Td series (1 - Tdap) 02/18/2012    PAP AKA CERVICAL CYTOLOGY  02/18/2012    LIPID PANEL Q1  09/07/2017    FOOT EXAM Q1  07/23/2019    Influenza Age 9 to Adult  08/01/2019           Coordination of Care:  1. Have you been to the ER, urgent care clinic since your last visit? Hospitalized since your last visit? no    2. Have you seen or consulted any other health care providers outside of the [address] [place] [address] since your last visit? Include any pap smears or colon screening.  [facility]

## 2019-10-16 NOTE — PROGRESS NOTES
Subjective:   Marychuy Villalba is a 30 y.o. female here today for 4 month(s) follow up on Attention Deficit Hyperactivity Disorder. Patient states they are well controlled on current medication Adderall all of the time. Denies any adverse effects. Attention Deficit Hyperactivity Disorder ROS: denies heart palpitations, denies insomnia, denies anorexia. Comments she would like to try Adderall XR due to feeling like medication is wearing off by 1400 or 1500. Takes medication approx. 0900. New concerns: patient states yesterday she has dysuria. States her glucose has been elevated due to having trouble with her insulin pump. Current Outpatient Medications   Medication Sig Dispense Refill    LANTUS SOLOSTAR U-100 INSULIN 100 unit/mL (3 mL) inpn INJECT 30 UNITS DAILY IN CASE OF PUMP FAILURE  3    dextroamphetamine-amphetamine (ADDERALL) 10 mg tablet Take 1 Tab by mouth three (3) times daily. Max Daily Amount: 30 mg. 90 Tab 0    ibuprofen (MOTRIN) 800 mg tablet Take 1 Tab by mouth every eight (8) hours as needed for Pain (take with food or milk). 90 Tab 1     insulin pump (PATIENT SUPPLIED) misc by SubCUTAneous route as needed.  insulin lispro (HUMALOG) 100 unit/mL injection by SubCUTAneous route.           Patient Active Problem List   Diagnosis Code    DKA (diabetic ketoacidoses) ([facility] E11.10     Lab Results   Component Value Date/Time    Cholesterol, total 199 (H) 06/12/2015 10:40 AM    HDL Cholesterol 90 06/12/2015 10:40 AM    LDL, calculated 101 06/12/2015 10:40 AM    Triglyceride 42 06/12/2015 10:40 AM     Lab Results   Component Value Date/Time    Sodium 138 01/29/2018 12:15 PM    Potassium 4.2 01/29/2018 12:15 PM    Chloride 102 01/29/2018 12:15 PM    CO2 29 01/29/2018 12:15 PM    Anion gap 7 01/29/2018 12:15 PM    Glucose 295 (H) 01/29/2018 12:15 PM    BUN 10 01/29/2018 12:15 PM    Creatinine 0.62 01/29/2018 12:15 PM    BUN/Creatinine ratio 16 01/29/2018 12:15 PM    GFR est AA >60 01/29/2018 12:15 PM    GFR est non-AA >60 01/29/2018 12:15 PM    Calcium 8.9 01/29/2018 12:15 PM    Bilirubin, total 1.0 01/29/2018 12:15 PM    AST (SGOT) 9 (L) 01/29/2018 12:15 PM    Alk. phosphatase 41 (L) 01/29/2018 12:15 PM    Protein, total 7.3 01/29/2018 12:15 PM    Albumin 4.0 01/29/2018 12:15 PM    Globulin 3.3 01/29/2018 12:15 PM    A-G Ratio 1.2 01/29/2018 12:15 PM    ALT (SGPT) 19 01/29/2018 12:15 PM     Lab Results   Component Value Date/Time    WBC 8.5 01/29/2018 12:15 PM    HGB 14.4 01/29/2018 12:15 PM    HCT 42.4 01/29/2018 12:15 PM    PLATELET 119 29/70/5136 12:15 PM    MCV 94.4 01/29/2018 12:15 PM     Wt Readings from Last 3 Encounters:   10/16/19 124 lb (56.2 kg)   05/17/19 126 lb (57.2 kg)   02/14/19 125 lb 12.8 oz (57.1 kg)     BP Readings from Last 3 Encounters:   10/16/19 119/76   05/17/19 119/77   02/14/19 117/68       Objective:   Visit Vitals  /76   Pulse (!) 115   Temp 98.2 °F (36.8 °C) (Oral)   Resp 16   Ht 5' 2\" (1.575 m)   Wt 124 lb (56.2 kg)   LMP 10/08/2019   SpO2 98%   BMI 22.68 kg/m²     General appearance - alert, well appearing, and in no distress  Neck - supple, no significant adenopathy  Chest - clear to auscultation, no wheezes, rales or rhonchi, symmetric air entry  Heart - normal rate, regular rhythm, normal S1, S2, no murmurs, rubs, clicks or gallops  Extremities - peripheral pulses normal, no pedal edema, no clubbing or cyanosis  Left lower quadrant tenderness      Assessment/Plan:    ICD-10-CM ICD-9-CM    1. Attention deficit hyperactivity disorder (ADHD), unspecified ADHD type F90.9 314.01 amphetamine-dextroamphetamine XR (ADDERALL XR) 20 mg XR capsule      dextroamphetamine-amphetamine (ADDERALL) 10 mg tablet   2. History of ankle surgery Z98.890 V45.89 ibuprofen (MOTRIN) 800 mg tablet   3. History of back surgery Z98.890 V45.89 ibuprofen (MOTRIN) 800 mg tablet   4. Dysuria R30.0 788. 1 CULTURE, URINE   5.  Encounter for long-term (current) use of high-risk medication Z79.899 V58.69 COMPLIANCE DRUG SCREEN/PRESCRIPTION MONITORING     Orders Placed This Encounter    CULTURE, URINE    COMPLIANCE DRUG SCREEN/PRESCRIPTION MONITORING    LANTUS LOYAOSTAR U-100 INSULIN 100 unit/mL (3 mL) inpn    ibuprofen (MOTRIN) 800 mg tablet    trimethoprim-sulfamethoxazole (BACTRIM DS, SEPTRA DS) 160-800 mg per tablet    amphetamine-dextroamphetamine XR (ADDERALL XR) 20 mg XR capsule    dextroamphetamine-amphetamine (ADDERALL) 10 mg tablet         I have reviewed the patients controlled substance prescription history, as maintained in the [facility]. There is no suspicious activity noted. Usage is consistent with current use of prescribed medications. Urine drug screen was collected today. I have discussed the diagnosis with the patient and the intended plan as seen in the above orders. The patient has received an after-visit summary and questions were answered concerning future plans. I have discussed medication side effects and warnings with the patient as well. Patient agreeable with above plan and verbalizes understanding. Follow-up and Dispositions    · Return in about 4 months (around 2/16/2020) for ADD.

## 2019-10-18 LAB
BACTERIA SPEC CULT: NORMAL
SERVICE CMNT-IMP: NORMAL

## 2019-10-22 LAB — DRUGS UR: NORMAL

## 2019-11-14 DIAGNOSIS — F90.9 ATTENTION DEFICIT HYPERACTIVITY DISORDER (ADHD), UNSPECIFIED ADHD TYPE: ICD-10-CM

## 2019-11-14 RX ORDER — DEXTROAMPHETAMINE SACCHARATE, AMPHETAMINE ASPARTATE MONOHYDRATE, DEXTROAMPHETAMINE SULFATE AND AMPHETAMINE SULFATE 5; 5; 5; 5 MG/1; MG/1; MG/1; MG/1
20 CAPSULE, EXTENDED RELEASE ORAL
Qty: 30 CAP | Refills: 0 | Status: SHIPPED | OUTPATIENT
Start: 2019-11-14 | End: 2019-11-21 | Stop reason: SDUPTHER

## 2019-11-14 NOTE — TELEPHONE ENCOUNTER
I have reviewed the patients controlled substance prescription history, as maintained in the [facility]. There is no suspicious activity noted. Usage is consistent with current use of prescribed medications.

## 2019-11-14 NOTE — TELEPHONE ENCOUNTER
Requested Prescriptions     Pending Prescriptions Disp Refills    amphetamine-dextroamphetamine XR (ADDERALL XR) 20 mg XR capsule 30 Cap 0     Sig: Take 1 Cap by mouth every morning. Max Daily Amount: 20 mg.         Pharmacy is [facility]

## 2019-11-19 DIAGNOSIS — F90.9 ATTENTION DEFICIT HYPERACTIVITY DISORDER (ADHD), UNSPECIFIED ADHD TYPE: ICD-10-CM

## 2019-11-19 NOTE — TELEPHONE ENCOUNTER
Requested Prescriptions     Pending Prescriptions Disp Refills    dextroamphetamine-amphetamine (ADDERALL) 10 mg tablet 30 Tab 0     Sig: Take between [id number] if needed       Pt wanted both the 20 mg and 10 mg.

## 2019-11-21 RX ORDER — DEXTROAMPHETAMINE SACCHARATE, AMPHETAMINE ASPARTATE MONOHYDRATE, DEXTROAMPHETAMINE SULFATE AND AMPHETAMINE SULFATE 5; 5; 5; 5 MG/1; MG/1; MG/1; MG/1
20 CAPSULE, EXTENDED RELEASE ORAL
Qty: 30 CAP | Refills: 0 | Status: SHIPPED | OUTPATIENT
Start: 2019-11-21 | End: 2019-12-13 | Stop reason: SDUPTHER

## 2019-11-21 RX ORDER — DEXTROAMPHETAMINE SACCHARATE, AMPHETAMINE ASPARTATE, DEXTROAMPHETAMINE SULFATE AND AMPHETAMINE SULFATE 2.5; 2.5; 2.5; 2.5 MG/1; MG/1; MG/1; MG/1
TABLET ORAL
Qty: 30 TAB | Refills: 0 | Status: SHIPPED | OUTPATIENT
Start: 2019-11-21 | End: 2019-12-13 | Stop reason: SDUPTHER

## 2019-12-13 ENCOUNTER — TELEPHONE (OUTPATIENT)
Dept: FAMILY MEDICINE CLINIC | Age: 29
End: 2019-12-13

## 2019-12-13 DIAGNOSIS — F90.9 ATTENTION DEFICIT HYPERACTIVITY DISORDER (ADHD), UNSPECIFIED ADHD TYPE: ICD-10-CM

## 2019-12-13 RX ORDER — DEXTROAMPHETAMINE SACCHARATE, AMPHETAMINE ASPARTATE MONOHYDRATE, DEXTROAMPHETAMINE SULFATE AND AMPHETAMINE SULFATE 5; 5; 5; 5 MG/1; MG/1; MG/1; MG/1
20 CAPSULE, EXTENDED RELEASE ORAL
Qty: 30 CAP | Refills: 0 | Status: SHIPPED | OUTPATIENT
Start: 2019-12-13 | End: 2020-02-17 | Stop reason: SDUPTHER

## 2019-12-13 RX ORDER — DEXTROAMPHETAMINE SACCHARATE, AMPHETAMINE ASPARTATE, DEXTROAMPHETAMINE SULFATE AND AMPHETAMINE SULFATE 2.5; 2.5; 2.5; 2.5 MG/1; MG/1; MG/1; MG/1
TABLET ORAL
Qty: 30 TAB | Refills: 0 | Status: SHIPPED | OUTPATIENT
Start: 2019-12-20 | End: 2020-01-24 | Stop reason: SDUPTHER

## 2019-12-13 NOTE — TELEPHONE ENCOUNTER
I have reviewed the patients controlled substance prescription history, as maintained in the [facility]. There is no suspicious activity noted. Usage is consistent with current use of prescribed medications.

## 2019-12-16 NOTE — TELEPHONE ENCOUNTER
Patient called in to see if pharmacy could be contacted to clarify prescription. States they are advising her that they recvd 2 requests for the 20mg adderall, notes show 10mg and 20mg.    Please adv pt [phone]

## 2019-12-16 NOTE — TELEPHONE ENCOUNTER
Spoke with the patient. Advised patient per chart review she is unable to fill Adderall 10 mg tablet til 12/20/2019. Will call to confirm the prescription is at the pharmacy. She verbalized understanding.        Per pharmacy rx has been placed in patient profile to be filled on 12/20/2019

## 2020-01-24 DIAGNOSIS — F90.9 ATTENTION DEFICIT HYPERACTIVITY DISORDER (ADHD), UNSPECIFIED ADHD TYPE: ICD-10-CM

## 2020-01-24 NOTE — TELEPHONE ENCOUNTER
Last Visit: 10/16/2019 with ALEKSANDR Ferrer  Next Appointment: 02/12/2020 with ALEKSANDR Ferrer  Previous Refill Encounter(s): 12/20/2019 per ALEKSANDR Ferrer #30    Requested Prescriptions     Pending Prescriptions Disp Refills    dextroamphetamine-amphetamine (ADDERALL) 10 mg tablet 30 Tab 0     Sig: Take between [id number] if needed

## 2020-01-27 RX ORDER — DEXTROAMPHETAMINE SACCHARATE, AMPHETAMINE ASPARTATE, DEXTROAMPHETAMINE SULFATE AND AMPHETAMINE SULFATE 2.5; 2.5; 2.5; 2.5 MG/1; MG/1; MG/1; MG/1
TABLET ORAL
Qty: 30 TAB | Refills: 0 | Status: SHIPPED | OUTPATIENT
Start: 2020-01-27 | End: 2020-02-28 | Stop reason: SDUPTHER

## 2020-01-27 NOTE — TELEPHONE ENCOUNTER
I have reviewed the patients controlled substance prescription history, as maintained in the [facility]. There is no suspicious activity noted. Usage is consistent with current use of prescribed medications.

## 2020-02-17 DIAGNOSIS — F90.9 ATTENTION DEFICIT HYPERACTIVITY DISORDER (ADHD), UNSPECIFIED ADHD TYPE: ICD-10-CM

## 2020-02-17 RX ORDER — DEXTROAMPHETAMINE SACCHARATE, AMPHETAMINE ASPARTATE MONOHYDRATE, DEXTROAMPHETAMINE SULFATE AND AMPHETAMINE SULFATE 5; 5; 5; 5 MG/1; MG/1; MG/1; MG/1
20 CAPSULE, EXTENDED RELEASE ORAL
Qty: 30 CAP | Refills: 0 | Status: SHIPPED | OUTPATIENT
Start: 2020-02-17 | End: 2020-03-19

## 2020-02-17 NOTE — TELEPHONE ENCOUNTER
Requested Prescriptions     Pending Prescriptions Disp Refills    amphetamine-dextroamphetamine XR (ADDERALL XR) 20 mg XR capsule 30 Cap 0     Sig: Take 1 Cap by mouth every morning. Max Daily Amount: 20 mg.

## 2020-02-17 NOTE — TELEPHONE ENCOUNTER
[facility] XR as 12/13/19 for a 30 d/s. UDS done 10/16/19  CSA signed none    Last Visit: 10/16/19 with ALEKSANDR Xie  Next Appointment: 2/28/20 with ALEKSANDR Xie  Previous Refill Encounter(s): 12/13/19 #30    Requested Prescriptions     Pending Prescriptions Disp Refills    amphetamine-dextroamphetamine XR (ADDERALL XR) 20 mg XR capsule 30 Cap 0     Sig: Take 1 Cap by mouth every morning. Max Daily Amount: 20 mg.

## 2020-02-28 ENCOUNTER — OFFICE VISIT (OUTPATIENT)
Dept: FAMILY MEDICINE CLINIC | Age: 30
End: 2020-02-28

## 2020-02-28 VITALS
OXYGEN SATURATION: 98 % | SYSTOLIC BLOOD PRESSURE: 108 MMHG | RESPIRATION RATE: 20 BRPM | TEMPERATURE: 97.7 F | HEART RATE: 100 BPM | WEIGHT: 127 LBS | HEIGHT: 62 IN | DIASTOLIC BLOOD PRESSURE: 64 MMHG | BODY MASS INDEX: 23.37 KG/M2

## 2020-02-28 DIAGNOSIS — F90.9 ATTENTION DEFICIT HYPERACTIVITY DISORDER (ADHD), UNSPECIFIED ADHD TYPE: ICD-10-CM

## 2020-02-28 RX ORDER — OMEPRAZOLE 20 MG/1
20 CAPSULE, DELAYED RELEASE ORAL
Qty: 30 CAP | Refills: 2 | Status: SHIPPED | OUTPATIENT
Start: 2020-02-28 | End: 2020-05-21

## 2020-02-28 RX ORDER — DEXTROAMPHETAMINE SACCHARATE, AMPHETAMINE ASPARTATE, DEXTROAMPHETAMINE SULFATE AND AMPHETAMINE SULFATE 2.5; 2.5; 2.5; 2.5 MG/1; MG/1; MG/1; MG/1
TABLET ORAL
Qty: 30 TAB | Refills: 0 | Status: SHIPPED | OUTPATIENT
Start: 2020-02-28 | End: 2020-03-17 | Stop reason: SDUPTHER

## 2020-02-28 NOTE — PROGRESS NOTES
Subjective:   Ole Alvarez is a 35 y.o. female here today for 4 month(s) follow up on Attention Deficit Hyperactivity Disorder. Patient states they are well controlled on current medication Adderall and Adderall XR all of the time. Denies any adverse effects. Patient does report she currently has not met her deductible for her insurance and Adderall XR is more expensive. Requesting to return to take Adderall tid when it is time for another refill. Attention Deficit Hyperactivity Disorder ROS: denies heart palpitations, denies insomnia, denies anorexia. 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 Patient also requesting a refill on prilosec. Comments her reflux symptoms have returned. States the symptoms are mild. She was taking otc medication with improvement however, since the recent recall on zantac. Current Outpatient Medications   Medication Sig Dispense Refill    amphetamine-dextroamphetamine XR (ADDERALL XR) 20 mg XR capsule Take 1 Cap by mouth every morning. Max Daily Amount: 20 mg. 30 Cap 0    dextroamphetamine-amphetamine (ADDERALL) 10 mg tablet Take between 3964-1762 if needed 30 Tab 0    LANTUS SOLOSTAR U-100 INSULIN 100 unit/mL (3 mL) inpn INJECT 30 UNITS DAILY IN CASE OF PUMP FAILURE  3    ibuprofen (MOTRIN) 800 mg tablet Take 1 Tab by mouth every eight (8) hours as needed for Pain (take with food or milk). 90 Tab 1     insulin pump (PATIENT SUPPLIED) misc by SubCUTAneous route as needed.  insulin lispro (HUMALOG) 100 unit/mL injection by SubCUTAneous route.           Patient Active Problem List   Diagnosis Code    DKA (diabetic ketoacidoses) ([facility] E11.10     Lab Results   Component Value Date/Time    Cholesterol, total 199 (H) 06/12/2015 10:40 AM    HDL Cholesterol 90 06/12/2015 10:40 AM    LDL, calculated 101 06/12/2015 10:40 AM    Triglyceride 42 06/12/2015 10:40 AM     Lab Results   Component Value Date/Time    Sodium 138 01/29/2018 12:15 PM    Potassium 4.2 01/29/2018 12:15 PM    Chloride 102 01/29/2018 12:15 PM    CO2 29 01/29/2018 12:15 PM    Anion gap 7 01/29/2018 12:15 PM    Glucose 295 (H) 01/29/2018 12:15 PM    BUN 10 01/29/2018 12:15 PM    Creatinine 0.62 01/29/2018 12:15 PM    BUN/Creatinine ratio 16 01/29/2018 12:15 PM    GFR est AA >60 01/29/2018 12:15 PM    GFR est non-AA >60 01/29/2018 12:15 PM    Calcium 8.9 01/29/2018 12:15 PM    Bilirubin, total 1.0 01/29/2018 12:15 PM    AST (SGOT) 9 (L) 01/29/2018 12:15 PM    Alk. phosphatase 41 (L) 01/29/2018 12:15 PM    Protein, total 7.3 01/29/2018 12:15 PM    Albumin 4.0 01/29/2018 12:15 PM    Globulin 3.3 01/29/2018 12:15 PM    A-G Ratio 1.2 01/29/2018 12:15 PM    ALT (SGPT) 19 01/29/2018 12:15 PM     Lab Results   Component Value Date/Time    WBC 8.5 01/29/2018 12:15 PM    HGB 14.4 01/29/2018 12:15 PM    HCT 42.4 01/29/2018 12:15 PM    PLATELET 742 20/86/6202 12:15 PM    MCV 94.4 01/29/2018 12:15 PM     Wt Readings from Last 3 Encounters:   10/16/19 124 lb (56.2 kg)   05/17/19 126 lb (57.2 kg)   02/14/19 125 lb 12.8 oz (57.1 kg)     BP Readings from Last 3 Encounters:   10/16/19 119/76   05/17/19 119/77   02/14/19 117/68       Objective:   Visit Vitals  /64 (BP 1 Location: Right arm, BP Patient Position: Sitting)   Pulse 100   Temp 97.7 °F (36.5 °C) (Oral)   Resp 20   Ht 5' 2\" (1.575 m)   Wt 127 lb (57.6 kg)   LMP 02/01/2020   SpO2 98%   BMI 23.23 kg/m²     General appearance - alert, well appearing, and in no distress  Neck - supple, no significant adenopathy  Chest - clear to auscultation, no wheezes, rales or rhonchi, symmetric air entry  Heart - normal rate, regular rhythm, normal S1, S2, no murmurs, rubs, clicks or gallops  Extremities - peripheral pulses normal, no pedal edema, no clubbing or cyanosis      Assessment/Plan:    ICD-10-CM ICD-9-CM    1.  Attention deficit hyperactivity disorder (ADHD), unspecified ADHD type F90.9 314.01 dextroamphetamine-amphetamine (ADDERALL) 10 mg tablet     Orders Placed This Encounter    dextroamphetamine-amphetamine (ADDERALL) 10 mg tablet    omeprazole (PRILOSEC) 20 mg capsule       I have reviewed the patients controlled substance prescription history, [facility] There is no suspicious activity noted. Usage is consistent with current use of prescribed medications. Urine drug screen was not collected today. I have discussed the diagnosis with the patient and the intended plan as seen in the above orders. The patient has received an after-visit summary and questions were answered concerning future plans. I have discussed medication side effects and warnings with the patient as well. Patient agreeable with above plan and verbalizes understanding. Follow-up and Dispositions    · Return in about 6 months (around 8/28/2020) for ADD.

## 2020-04-20 DIAGNOSIS — F90.9 ATTENTION DEFICIT HYPERACTIVITY DISORDER (ADHD), UNSPECIFIED ADHD TYPE: ICD-10-CM

## 2020-04-20 RX ORDER — DEXTROAMPHETAMINE SACCHARATE, AMPHETAMINE ASPARTATE, DEXTROAMPHETAMINE SULFATE AND AMPHETAMINE SULFATE 2.5; 2.5; 2.5; 2.5 MG/1; MG/1; MG/1; MG/1
10 TABLET ORAL 3 TIMES DAILY
Qty: 90 TAB | Refills: 0 | Status: SHIPPED | OUTPATIENT
Start: 2020-04-20 | End: 2020-05-21 | Stop reason: SDUPTHER

## 2020-05-21 DIAGNOSIS — F90.9 ATTENTION DEFICIT HYPERACTIVITY DISORDER (ADHD), UNSPECIFIED ADHD TYPE: ICD-10-CM

## 2020-05-21 RX ORDER — OMEPRAZOLE 20 MG/1
20 CAPSULE, DELAYED RELEASE ORAL
Qty: 90 CAP | Refills: 0 | Status: SHIPPED | OUTPATIENT
Start: 2020-05-21 | End: 2020-08-13

## 2020-05-21 NOTE — TELEPHONE ENCOUNTER
[facility]20[facility]20 for a 30 d/s. Last Visit: 2/28/20 with ALEKSANDR Daley  Next Appointment: 8/26/20 with MD Fernanda Daley  Previous Refill Encounter(s): 4/20/20 #90    Requested Prescriptions     Pending Prescriptions Disp Refills    dextroamphetamine-amphetamine (ADDERALL) 10 mg tablet 90 Tab 0     Sig: Take 1 Tab by mouth three (3) times daily. Max Daily Amount: 30 mg.

## 2020-05-22 RX ORDER — DEXTROAMPHETAMINE SACCHARATE, AMPHETAMINE ASPARTATE, DEXTROAMPHETAMINE SULFATE AND AMPHETAMINE SULFATE 2.5; 2.5; 2.5; 2.5 MG/1; MG/1; MG/1; MG/1
10 TABLET ORAL 3 TIMES DAILY
Qty: 90 TAB | Refills: 0 | Status: SHIPPED | OUTPATIENT
Start: 2020-05-22 | End: 2020-06-25 | Stop reason: SDUPTHER

## 2020-06-25 DIAGNOSIS — F90.9 ATTENTION DEFICIT HYPERACTIVITY DISORDER (ADHD), UNSPECIFIED ADHD TYPE: ICD-10-CM

## 2020-06-30 ENCOUNTER — PATIENT MESSAGE (OUTPATIENT)
Dept: FAMILY MEDICINE CLINIC | Age: 30
End: 2020-06-30

## 2020-06-30 RX ORDER — DEXTROAMPHETAMINE SACCHARATE, AMPHETAMINE ASPARTATE, DEXTROAMPHETAMINE SULFATE AND AMPHETAMINE SULFATE 2.5; 2.5; 2.5; 2.5 MG/1; MG/1; MG/1; MG/1
10 TABLET ORAL 3 TIMES DAILY
Qty: 90 TAB | Refills: 0 | Status: SHIPPED | OUTPATIENT
Start: 2020-06-30 | End: 2020-08-04 | Stop reason: SDUPTHER

## 2020-06-30 NOTE — TELEPHONE ENCOUNTER
Pt was contacted, informed med refill sent to [facility].     Pt also requested [employer] activation code. Pt also stated will send over form (adoption papers) needing to be completed with corrections.

## 2020-08-04 DIAGNOSIS — F90.9 ATTENTION DEFICIT HYPERACTIVITY DISORDER (ADHD), UNSPECIFIED ADHD TYPE: ICD-10-CM

## 2020-08-04 NOTE — TELEPHONE ENCOUNTER
[facility]derall as 6/30/20 for a 30 d/s. Last Visit: 2/28/20 with ALEKSANDR Arita  Next Appointment: 8/26/20 with ALEKSANDR Arita  Previous Refill Encounter(s): 6/30/20 #90    Requested Prescriptions     Pending Prescriptions Disp Refills    dextroamphetamine-amphetamine (ADDERALL) 10 mg tablet 90 Tab 0     Sig: Take 1 Tab by mouth three (3) times daily. Max Daily Amount: 30 mg.

## 2020-08-05 RX ORDER — DEXTROAMPHETAMINE SACCHARATE, AMPHETAMINE ASPARTATE, DEXTROAMPHETAMINE SULFATE AND AMPHETAMINE SULFATE 2.5; 2.5; 2.5; 2.5 MG/1; MG/1; MG/1; MG/1
10 TABLET ORAL 3 TIMES DAILY
Qty: 90 TAB | Refills: 0 | Status: SHIPPED | OUTPATIENT
Start: 2020-08-05 | End: 2020-09-08 | Stop reason: SDUPTHER

## 2020-08-13 RX ORDER — OMEPRAZOLE 20 MG/1
20 CAPSULE, DELAYED RELEASE ORAL
Qty: 90 CAP | Refills: 0 | Status: SHIPPED | OUTPATIENT
Start: 2020-08-13 | End: 2020-11-11

## 2020-08-26 ENCOUNTER — VIRTUAL VISIT (OUTPATIENT)
Dept: FAMILY MEDICINE CLINIC | Age: 30
End: 2020-08-26

## 2020-08-26 DIAGNOSIS — Z91.199 NO-SHOW FOR APPOINTMENT: Primary | ICD-10-CM

## 2020-09-08 DIAGNOSIS — F90.9 ATTENTION DEFICIT HYPERACTIVITY DISORDER (ADHD), UNSPECIFIED ADHD TYPE: ICD-10-CM

## 2020-09-08 RX ORDER — DEXTROAMPHETAMINE SACCHARATE, AMPHETAMINE ASPARTATE, DEXTROAMPHETAMINE SULFATE AND AMPHETAMINE SULFATE 2.5; 2.5; 2.5; 2.5 MG/1; MG/1; MG/1; MG/1
10 TABLET ORAL 3 TIMES DAILY
Qty: 90 TAB | Refills: 0 | Status: SHIPPED | OUTPATIENT
Start: 2020-09-08 | End: 2020-09-21 | Stop reason: DRUGHIGH

## 2020-09-08 NOTE — TELEPHONE ENCOUNTER
[facility] Adderall as 8/5/20 for a 30 d/s. Last Visit: 2/28/20 with ALEKSANDR Gan  Next Appointment: 9/21/20 with ALEKSANDR Gan  Previous Refill Encounter(s): 8/5/20 #90    Requested Prescriptions     Pending Prescriptions Disp Refills    dextroamphetamine-amphetamine (ADDERALL) 10 mg tablet 90 Tab 0     Sig: Take 1 Tab by mouth three (3) times daily. Max Daily Amount: 30 mg.

## 2020-09-21 ENCOUNTER — VIRTUAL VISIT (OUTPATIENT)
Dept: FAMILY MEDICINE CLINIC | Age: 30
End: 2020-09-21

## 2020-09-21 DIAGNOSIS — J22 LOWER RESPIRATORY INFECTION (E.G., BRONCHITIS, PNEUMONIA, PNEUMONITIS, PULMONITIS): ICD-10-CM

## 2020-09-21 DIAGNOSIS — F90.9 ATTENTION DEFICIT HYPERACTIVITY DISORDER (ADHD), UNSPECIFIED ADHD TYPE: Primary | ICD-10-CM

## 2020-09-21 RX ORDER — AZITHROMYCIN 250 MG/1
TABLET, FILM COATED ORAL
Qty: 6 TAB | Refills: 0 | Status: SHIPPED | OUTPATIENT
Start: 2020-09-21 | End: 2020-09-26

## 2020-09-21 RX ORDER — DEXTROAMPHETAMINE SACCHARATE, AMPHETAMINE ASPARTATE, DEXTROAMPHETAMINE SULFATE AND AMPHETAMINE SULFATE 5; 5; 5; 5 MG/1; MG/1; MG/1; MG/1
20 TABLET ORAL 2 TIMES DAILY
Qty: 60 TAB | Refills: 0 | Status: SHIPPED | OUTPATIENT
Start: 2020-09-28 | End: 2020-11-17 | Stop reason: SDUPTHER

## 2020-09-21 RX ORDER — BENZONATATE 200 MG/1
200 CAPSULE ORAL
Qty: 21 CAP | Refills: 0 | Status: SHIPPED | OUTPATIENT
Start: 2020-09-21 | End: 2020-09-28

## 2020-09-21 RX ORDER — ALBUTEROL SULFATE 90 UG/1
2 AEROSOL, METERED RESPIRATORY (INHALATION)
Qty: 1 INHALER | Refills: 0 | Status: SHIPPED | OUTPATIENT
Start: 2020-09-21 | End: 2021-01-06 | Stop reason: SDUPTHER

## 2020-09-21 NOTE — PROGRESS NOTES
Paul Lundy is a 35 y.o. female who was seen by synchronous (real-time) audio-video technology on 9/21/2020 for Attention Deficit Disorder and Cough    Assessment & Plan:   Diagnoses and all orders for this visit:    1. Attention deficit hyperactivity disorder (ADHD), unspecified ADHD type  -     dextroamphetamine-amphetamine (ADDERALL) 20 mg tablet; Take 1 Tab by mouth two (2) times a day. Max Daily Amount: 40 mg.    2. Lower respiratory infection (e.g., bronchitis, pneumonia, pneumonitis, pulmonitis)    Other orders  -     benzonatate (TESSALON) 200 mg capsule; Take 1 Cap by mouth three (3) times daily as needed for Cough for up to 7 days. -     azithromycin (ZITHROMAX) 250 mg tablet; Take 2 tablets today, then take 1 tablet daily  -     albuterol (PROVENTIL HFA, VENTOLIN HFA, PROAIR HFA) 90 mcg/actuation inhaler; Take 2 Puffs by inhalation every four (4) hours as needed for Wheezing, Shortness of Breath or Cough. alarm signs when to seek emergent care provided and reviewed     Follow-up and Dispositions    · Return in about 4 weeks (around 10/19/2020) for ADD, in office follow up. I spent at least 20 minutes on this visit with this established patient. 712  Subjective:   Patient states she has been having cough, sob, chest tightness increased with coughing since 9/11/2020. Comments she was testing for COVID last Monday and results returned negative Friday. [facility]  States runny nose, body aches have gotten better. States has noticed blood tinged mucus when blowing her nose and also dark brown sputum with tinges of blood with coughing. Admits to chest tightness. Admits to intermittent upper back pain. States cough is worse at night. ADD: taking Adderall tid, however she reports she feels like medication doesn't last.  Comments she has tried taking 2 tabs in the AM which last until lunch 4hrs after first dose.   Will take 1 tab and states at times will have to repeat 2 hrs after to get her through the work day. Comments she has been working from home since [place] and has been working later. Prior to Admission medications    Medication Sig Start Date End Date Taking? Authorizing Provider   dextroamphetamine-amphetamine (ADDERALL) 10 mg tablet Take 1 Tab by mouth three (3) times daily. Max Daily Amount: 30 mg. 9/8/20   Hema MENDOZA NP   omeprazole (PRILOSEC) 20 mg capsule TAKE 1 CAP BY MOUTH DAILY (BEFORE BREAKFAST). 8/13/20   Hema MENDOZA NP   LANTUS SOLOSTAR U-100 INSULIN 100 unit/mL (3 mL) inpn INJECT 30 UNITS DAILY IN CASE OF PUMP FAILURE 9/19/19   Provider, Historical   ibuprofen (MOTRIN) 800 mg tablet Take 1 Tab by mouth every eight (8) hours as needed for Pain (take with food or milk). 10/16/19   Hema MENDOZA NP    insulin pump (PATIENT SUPPLIED) misc by SubCUTAneous route as needed. Provider, Historical   insulin lispro (HUMALOG) 100 unit/mL injection by SubCUTAneous route. Other, MD Patricia     Patient Active Problem List   Diagnosis Code    DKA (diabetic ketoacidoses) ([facility] E11.10     Patient Active Problem List    Diagnosis Date Noted    DKA (diabetic ketoacidoses) ([facility] 02/10/2017     No Known Allergies  Past Medical History:   Diagnosis Date    ADHD (attention deficit hyperactivity disorder)     Diabetes mellitus type 1 ([facility]     Endometriosis      Past Surgical History:   Procedure Laterality Date    HX BACK SURGERY  5/23/2014    lower back     HX ORTHOPAEDIC  5/23/2014    right medial ankle      Family History   Problem Relation Age of Onset    Arthritis-rheumatoid Paternal Grandmother     Hypertension Mother     Diabetes Maternal Grandmother         type 2    Heart Disease Neg Hx      Social History     Tobacco Use    Smoking status: Former Smoker    Smokeless tobacco: Never Used   Substance Use Topics    Alcohol use:  Yes     Alcohol/week: 0.0 standard drinks     Comment: occasionally      Review of Systems Constitutional: Negative for chills and fever. Respiratory: Positive for cough. Negative for shortness of breath. Cardiovascular: Negative for chest pain and palpitations. Neurological: Negative for dizziness and headaches. Objective:   No flowsheet data found. General: alert, cooperative, no distress   Mental  status: normal mood, behavior, speech, dress, motor activity, and thought processes, able to follow commands   HENT: NCAT   Neck: no visualized mass   Resp: no respiratory distress   Neuro: no gross deficits   Skin: no discoloration or lesions of concern on visible areas   Psychiatric: normal affect, consistent with stated mood, no evidence of hallucinations     Additional exam findings: We discussed the expected course, resolution and complications of the diagnosis(es) in detail. Medication risks, benefits, costs, interactions, and alternatives were discussed as indicated. I advised her to contact the office if her condition worsens, changes or fails to improve as anticipated. She expressed understanding with the diagnosis(es) and plan. Zachary Damico, who was evaluated through a patient-initiated, synchronous (real-time) audio-video encounter, and/or her healthcare decision maker, is aware that it is a billable service, with coverage as determined by her insurance carrier. She provided verbal consent to proceed: Yes, and patient identification was verified. It was conducted pursuant to the emergency declaration under the [address] [place] [address], [address] [place] [address] authority and the [employer] Act. A caregiver was present when appropriate. Ability to conduct physical exam was limited. I was in the office. The patient was at home.       Haresh Buckley NP

## 2020-10-22 ENCOUNTER — VIRTUAL VISIT (OUTPATIENT)
Dept: FAMILY MEDICINE CLINIC | Age: 30
End: 2020-10-22

## 2020-11-09 NOTE — TELEPHONE ENCOUNTER
Last Visit: 9/21/20 with ALEKSANDR Brian  Next Appointment: none  Previous Refill Encounter(s): 8/13/20 #90    Requested Prescriptions     Pending Prescriptions Disp Refills    omeprazole (PRILOSEC) 20 mg capsule [Pharmacy Med Name: OMEPRAZOLE DR 20 MG CAPSULE] 90 Cap 0     Sig: TAKE 1 CAP BY MOUTH DAILY (BEFORE BREAKFAST).

## 2020-11-11 RX ORDER — OMEPRAZOLE 20 MG/1
20 CAPSULE, DELAYED RELEASE ORAL
Qty: 90 CAP | Refills: 0 | Status: SHIPPED | OUTPATIENT
Start: 2020-11-11 | End: 2022-05-10 | Stop reason: SDUPTHER

## 2020-11-17 DIAGNOSIS — F90.9 ATTENTION DEFICIT HYPERACTIVITY DISORDER (ADHD), UNSPECIFIED ADHD TYPE: ICD-10-CM

## 2020-11-17 NOTE — TELEPHONE ENCOUNTER
[facility] as 10/13/20 for a 30 d/s. Last Visit: 9/21/20 with NP Lauri Snell  Next Appointment: Advised to follow-up in 4 weeks  Previous Refill Encounter(s): 9/28/20 #60    Requested Prescriptions     Pending Prescriptions Disp Refills    dextroamphetamine-amphetamine (ADDERALL) 20 mg tablet 60 Tab 0     Sig: Take 1 Tab by mouth two (2) times a day. Max Daily Amount: 40 mg.

## 2020-11-19 RX ORDER — DEXTROAMPHETAMINE SACCHARATE, AMPHETAMINE ASPARTATE, DEXTROAMPHETAMINE SULFATE AND AMPHETAMINE SULFATE 5; 5; 5; 5 MG/1; MG/1; MG/1; MG/1
20 TABLET ORAL 2 TIMES DAILY
Qty: 60 TAB | Refills: 0 | Status: SHIPPED | OUTPATIENT
Start: 2020-11-19 | End: 2020-12-23 | Stop reason: SDUPTHER

## 2020-12-23 DIAGNOSIS — F90.9 ATTENTION DEFICIT HYPERACTIVITY DISORDER (ADHD), UNSPECIFIED ADHD TYPE: ICD-10-CM

## 2020-12-23 NOTE — TELEPHONE ENCOUNTER
[facility] Adderall as 11/19/20 for a 30 d/s. UDS done 10/16/19    Last Visit: 9/21/20 with NP Berenice Raman  Next Appointment: none  Previous Refill Encounter(s): 11/19/20 #60    Requested Prescriptions     Pending Prescriptions Disp Refills    dextroamphetamine-amphetamine (ADDERALL) 20 mg tablet 60 Tab 0     Sig: Take 1 Tab by mouth two (2) times a day. Max Daily Amount: 40 mg.

## 2020-12-24 RX ORDER — DEXTROAMPHETAMINE SACCHARATE, AMPHETAMINE ASPARTATE, DEXTROAMPHETAMINE SULFATE AND AMPHETAMINE SULFATE 5; 5; 5; 5 MG/1; MG/1; MG/1; MG/1
20 TABLET ORAL 2 TIMES DAILY
Qty: 60 TAB | Refills: 0 | Status: SHIPPED | OUTPATIENT
Start: 2020-12-24 | End: 2021-02-08 | Stop reason: SDUPTHER

## 2021-02-08 DIAGNOSIS — F90.9 ATTENTION DEFICIT HYPERACTIVITY DISORDER (ADHD), UNSPECIFIED ADHD TYPE: ICD-10-CM

## 2021-02-08 NOTE — TELEPHONE ENCOUNTER
[facility] Adderall as 12/24/20 for a 30 d/s. UDS done 10/16/19  CSA signed 10/16/19    Last Visit: 9/21/20 with NP Diana Ferrara  Next Appointment: none  Previous Refill Encounter(s): 12/24/20 #60    Requested Prescriptions     Pending Prescriptions Disp Refills    dextroamphetamine-amphetamine (ADDERALL) 20 mg tablet 60 Tab 0     Sig: Take 1 Tab by mouth two (2) times a day. Max Daily Amount: 40 mg.

## 2021-02-15 RX ORDER — DEXTROAMPHETAMINE SACCHARATE, AMPHETAMINE ASPARTATE, DEXTROAMPHETAMINE SULFATE AND AMPHETAMINE SULFATE 5; 5; 5; 5 MG/1; MG/1; MG/1; MG/1
20 TABLET ORAL 2 TIMES DAILY
Qty: 60 TAB | Refills: 0 | Status: SHIPPED | OUTPATIENT
Start: 2021-02-15 | End: 2021-03-25 | Stop reason: SDUPTHER

## 2021-03-25 DIAGNOSIS — F90.9 ATTENTION DEFICIT HYPERACTIVITY DISORDER (ADHD), UNSPECIFIED ADHD TYPE: ICD-10-CM

## 2021-03-25 NOTE — TELEPHONE ENCOUNTER
[facility] for Adderall as 2/15/21 for a 30 d/s. UDS done 10/16/19  CSA signed 10/16/19    Last Visit: 9/21/20 with ALEKSANDR Brown  Next Appointment: none  Previous Refill Encounter(s): 2/15/21 #60    Requested Prescriptions     Pending Prescriptions Disp Refills    dextroamphetamine-amphetamine (ADDERALL) 20 mg tablet 60 Tab 0     Sig: Take 1 Tab by mouth two (2) times a day. Max Daily Amount: 40 mg.

## 2021-03-29 RX ORDER — DEXTROAMPHETAMINE SACCHARATE, AMPHETAMINE ASPARTATE, DEXTROAMPHETAMINE SULFATE AND AMPHETAMINE SULFATE 5; 5; 5; 5 MG/1; MG/1; MG/1; MG/1
20 TABLET ORAL 2 TIMES DAILY
Qty: 60 TAB | Refills: 0 | Status: SHIPPED | OUTPATIENT
Start: 2021-03-29 | End: 2021-05-06 | Stop reason: SDUPTHER

## 2021-05-06 DIAGNOSIS — F90.9 ATTENTION DEFICIT HYPERACTIVITY DISORDER (ADHD), UNSPECIFIED ADHD TYPE: ICD-10-CM

## 2021-05-06 NOTE — TELEPHONE ENCOUNTER
[facility] for Adderall as 3/29/21 for a 30 d/s. UDS done 10/16/19  CSA signed 10/16/19    Last Visit: 9/21/20 with ALEKSANDR Rebolledo  Next Appointment: none  Previous Refill Encounter(s): 3/29/21 #60    Requested Prescriptions     Pending Prescriptions Disp Refills    dextroamphetamine-amphetamine (ADDERALL) 20 mg tablet 60 Tab 0     Sig: Take 1 Tab by mouth two (2) times a day. Max Daily Amount: 40 mg.

## 2021-05-07 RX ORDER — DEXTROAMPHETAMINE SACCHARATE, AMPHETAMINE ASPARTATE, DEXTROAMPHETAMINE SULFATE AND AMPHETAMINE SULFATE 5; 5; 5; 5 MG/1; MG/1; MG/1; MG/1
20 TABLET ORAL 2 TIMES DAILY
Qty: 60 TAB | Refills: 0 | Status: SHIPPED | OUTPATIENT
Start: 2021-05-07 | End: 2021-06-09 | Stop reason: SDUPTHER

## 2021-06-09 DIAGNOSIS — F90.9 ATTENTION DEFICIT HYPERACTIVITY DISORDER (ADHD), UNSPECIFIED ADHD TYPE: ICD-10-CM

## 2021-06-09 NOTE — TELEPHONE ENCOUNTER
[facility] for Adderall as 5/7/21 for a 30 d/s. Last Visit: 9/21/20 with NP Sana Rios  Next Appointment: none  Previous Refill Encounter(s): 5/7/21 #60    Requested Prescriptions     Pending Prescriptions Disp Refills    dextroamphetamine-amphetamine (ADDERALL) 20 mg tablet 60 Tablet 0     Sig: Take 1 Tablet by mouth two (2) times a day. Max Daily Amount: 40 mg.

## 2021-06-10 RX ORDER — DEXTROAMPHETAMINE SACCHARATE, AMPHETAMINE ASPARTATE, DEXTROAMPHETAMINE SULFATE AND AMPHETAMINE SULFATE 5; 5; 5; 5 MG/1; MG/1; MG/1; MG/1
20 TABLET ORAL 2 TIMES DAILY
Qty: 60 TABLET | Refills: 0 | Status: SHIPPED | OUTPATIENT
Start: 2021-06-10 | End: 2021-07-19 | Stop reason: SDUPTHER

## 2021-07-10 ENCOUNTER — APPOINTMENT (OUTPATIENT)
Dept: GENERAL RADIOLOGY | Age: 31
End: 2021-07-10
Attending: STUDENT IN AN ORGANIZED HEALTH CARE EDUCATION/TRAINING PROGRAM
Payer: COMMERCIAL

## 2021-07-10 ENCOUNTER — APPOINTMENT (OUTPATIENT)
Dept: GENERAL RADIOLOGY | Age: 31
End: 2021-07-10
Attending: EMERGENCY MEDICINE
Payer: COMMERCIAL

## 2021-07-10 ENCOUNTER — APPOINTMENT (OUTPATIENT)
Dept: CT IMAGING | Age: 31
End: 2021-07-10
Attending: STUDENT IN AN ORGANIZED HEALTH CARE EDUCATION/TRAINING PROGRAM
Payer: COMMERCIAL

## 2021-07-10 ENCOUNTER — HOSPITAL ENCOUNTER (EMERGENCY)
Age: 31
Discharge: OTHER INSTITUTION | End: 2021-07-10
Attending: EMERGENCY MEDICINE
Payer: COMMERCIAL

## 2021-07-10 VITALS
DIASTOLIC BLOOD PRESSURE: 82 MMHG | OXYGEN SATURATION: 97 % | RESPIRATION RATE: 16 BRPM | SYSTOLIC BLOOD PRESSURE: 131 MMHG | TEMPERATURE: 97.9 F | HEART RATE: 132 BPM

## 2021-07-10 DIAGNOSIS — S42.92XA CLOSED FRACTURE OF LEFT SHOULDER, INITIAL ENCOUNTER: ICD-10-CM

## 2021-07-10 DIAGNOSIS — S82.202B TYPE I OR II OPEN FRACTURE OF LEFT TIBIA AND FIBULA, INITIAL ENCOUNTER: Primary | ICD-10-CM

## 2021-07-10 DIAGNOSIS — S82.402B TYPE I OR II OPEN FRACTURE OF LEFT TIBIA AND FIBULA, INITIAL ENCOUNTER: Primary | ICD-10-CM

## 2021-07-10 DIAGNOSIS — F10.929 ALCOHOLIC INTOXICATION WITH COMPLICATION (HCC): ICD-10-CM
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
## 2021-07-10 DIAGNOSIS — E10.65 TYPE 1 DIABETES MELLITUS WITH HYPERGLYCEMIA (HCC): ICD-10-CM

## 2021-07-10 LAB
ABO + RH BLD: NORMAL
ANION GAP SERPL CALC-SCNC: 7 MMOL/L (ref 3–18)
APAP SERPL-MCNC: <2 UG/ML (ref 10–30)
BASOPHILS # BLD: 0.1 K/UL (ref 0–0.1)
BASOPHILS NFR BLD: 1 % (ref 0–2)
BLOOD GROUP ANTIBODIES SERPL: NORMAL
BUN SERPL-MCNC: 9 MG/DL (ref 7–18)
BUN/CREAT SERPL: 16 (ref 12–20)
CALCIUM SERPL-MCNC: 8.9 MG/DL (ref 8.5–10.1)
CHLORIDE SERPL-SCNC: 105 MMOL/L (ref 100–111)
CO2 SERPL-SCNC: 27 MMOL/L (ref 21–32)
CREAT SERPL-MCNC: 0.56 MG/DL (ref 0.6–1.3)
DIFFERENTIAL METHOD BLD: ABNORMAL
EOSINOPHIL # BLD: 0.1 K/UL (ref 0–0.4)
EOSINOPHIL NFR BLD: 1 % (ref 0–5)
ERYTHROCYTE [DISTWIDTH] IN BLOOD BY AUTOMATED COUNT: 12.4 % (ref 11.6–14.5)
ETHANOL SERPL-MCNC: 256 MG/DL (ref 0–3)
GLUCOSE SERPL-MCNC: 317 MG/DL (ref 74–99)
HCG SERPL QL: NEGATIVE
HCT VFR BLD AUTO: 45.7 % (ref 35–45)
HGB BLD-MCNC: 15.3 G/DL (ref 12–16)
LYMPHOCYTES # BLD: 2 K/UL (ref 0.9–3.6)
LYMPHOCYTES NFR BLD: 31 % (ref 21–52)
MCH RBC QN AUTO: 32.9 PG (ref 24–34)
MCHC RBC AUTO-ENTMCNC: 33.5 G/DL (ref 31–37)
MCV RBC AUTO: 98.3 FL (ref 74–97)
MONOCYTES # BLD: 0.5 K/UL (ref 0.05–1.2)
MONOCYTES NFR BLD: 9 % (ref 3–10)
NEUTS SEG # BLD: 3.4 K/UL (ref 1.8–8)
NEUTS SEG NFR BLD: 54 % (ref 40–73)
PLATELET # BLD AUTO: 235 K/UL (ref 135–420)
PMV BLD AUTO: 9.7 FL (ref 9.2–11.8)
POTASSIUM SERPL-SCNC: 3.8 MMOL/L (ref 3.5–5.5)
RBC # BLD AUTO: 4.65 M/UL (ref 4.2–5.3)
SALICYLATES SERPL-MCNC: 2.4 MG/DL (ref 2.8–20)
SODIUM SERPL-SCNC: 139 MMOL/L (ref 136–145)
SPECIMEN EXP DATE BLD: NORMAL
WBC # BLD AUTO: 6.3 K/UL (ref 4.6–13.2)

## 2021-07-10 PROCEDURE — 73562 X-RAY EXAM OF KNEE 3: CPT

## 2021-07-10 PROCEDURE — 99291 CRITICAL CARE FIRST HOUR: CPT

## 2021-07-10 PROCEDURE — 74011250636 HC RX REV CODE- 250/636: Performed by: STUDENT IN AN ORGANIZED HEALTH CARE EDUCATION/TRAINING PROGRAM

## 2021-07-10 PROCEDURE — 84703 CHORIONIC GONADOTROPIN ASSAY: CPT

## 2021-07-10 PROCEDURE — 90471 IMMUNIZATION ADMIN: CPT

## 2021-07-10 PROCEDURE — 80048 BASIC METABOLIC PNL TOTAL CA: CPT

## 2021-07-10 PROCEDURE — 96374 THER/PROPH/DIAG INJ IV PUSH: CPT

## 2021-07-10 PROCEDURE — 73590 X-RAY EXAM OF LOWER LEG: CPT

## 2021-07-10 PROCEDURE — 72125 CT NECK SPINE W/O DYE: CPT

## 2021-07-10 PROCEDURE — 70450 CT HEAD/BRAIN W/O DYE: CPT

## 2021-07-10 PROCEDURE — 75810000301 HC ER LEVEL 1 CLOSED TREATMNT FRACTURE/DISLOCATION

## 2021-07-10 PROCEDURE — 90715 TDAP VACCINE 7 YRS/> IM: CPT | Performed by: EMERGENCY MEDICINE

## 2021-07-10 PROCEDURE — 74177 CT ABD & PELVIS W/CONTRAST: CPT

## 2021-07-10 PROCEDURE — 74011250636 HC RX REV CODE- 250/636: Performed by: EMERGENCY MEDICINE

## 2021-07-10 PROCEDURE — 74011250636 HC RX REV CODE- 250/636

## 2021-07-10 PROCEDURE — 86901 BLOOD TYPING SEROLOGIC RH(D): CPT

## 2021-07-10 PROCEDURE — 85025 COMPLETE CBC W/AUTO DIFF WBC: CPT

## 2021-07-10 PROCEDURE — 96375 TX/PRO/DX INJ NEW DRUG ADDON: CPT

## 2021-07-10 PROCEDURE — 80179 DRUG ASSAY SALICYLATE: CPT

## 2021-07-10 PROCEDURE — 80143 DRUG ASSAY ACETAMINOPHEN: CPT

## 2021-07-10 PROCEDURE — 73030 X-RAY EXAM OF SHOULDER: CPT

## 2021-07-10 PROCEDURE — 73620 X-RAY EXAM OF FOOT: CPT

## 2021-07-10 PROCEDURE — 73610 X-RAY EXAM OF ANKLE: CPT

## 2021-07-10 PROCEDURE — 82077 ASSAY SPEC XCP UR&BREATH IA: CPT

## 2021-07-10 PROCEDURE — 74011000636 HC RX REV CODE- 636: Performed by: EMERGENCY MEDICINE

## 2021-07-10 PROCEDURE — 96376 TX/PRO/DX INJ SAME DRUG ADON: CPT

## 2021-07-10 PROCEDURE — 71045 X-RAY EXAM CHEST 1 VIEW: CPT

## 2021-07-10 PROCEDURE — 99284 EMERGENCY DEPT VISIT MOD MDM: CPT

## 2021-07-10 RX ORDER — ONDANSETRON 2 MG/ML
4 INJECTION INTRAMUSCULAR; INTRAVENOUS
Status: COMPLETED | OUTPATIENT
Start: 2021-07-10 | End: 2021-07-10

## 2021-07-10 RX ORDER — HYDROMORPHONE HYDROCHLORIDE 1 MG/ML
1 INJECTION, SOLUTION INTRAMUSCULAR; INTRAVENOUS; SUBCUTANEOUS ONCE
Status: COMPLETED | OUTPATIENT
Start: 2021-07-10 | End: 2021-07-10

## 2021-07-10 RX ORDER — MORPHINE SULFATE 4 MG/ML
4 INJECTION INTRAVENOUS
Status: COMPLETED | OUTPATIENT
Start: 2021-07-10 | End: 2021-07-10

## 2021-07-10 RX ORDER — CEFAZOLIN SODIUM/WATER 2 G/20 ML
2 SYRINGE (ML) INTRAVENOUS ONCE
Status: DISCONTINUED | OUTPATIENT
Start: 2021-07-10 | End: 2021-07-10 | Stop reason: SDUPTHER

## 2021-07-10 RX ORDER — CEFAZOLIN SODIUM 1 G/3ML
2 INJECTION, POWDER, FOR SOLUTION INTRAMUSCULAR; INTRAVENOUS
Status: DISCONTINUED | OUTPATIENT
Start: 2021-07-10 | End: 2021-07-10 | Stop reason: CLARIF

## 2021-07-10 RX ORDER — HYDROMORPHONE HYDROCHLORIDE 1 MG/ML
INJECTION, SOLUTION INTRAMUSCULAR; INTRAVENOUS; SUBCUTANEOUS
Status: COMPLETED
Start: 2021-07-10 | End: 2021-07-10

## 2021-07-10 RX ADMIN — HYDROMORPHONE HYDROCHLORIDE 1 MG: 1 INJECTION, SOLUTION INTRAMUSCULAR; INTRAVENOUS; SUBCUTANEOUS at 04:25

## 2021-07-10 RX ADMIN — HYDROMORPHONE HYDROCHLORIDE 1 MG: 1 INJECTION, SOLUTION INTRAMUSCULAR; INTRAVENOUS; SUBCUTANEOUS at 03:47

## 2021-07-10 RX ADMIN — CEFAZOLIN 2 G: 1 INJECTION, POWDER, FOR SOLUTION INTRAMUSCULAR; INTRAVENOUS at 04:38

## 2021-07-10 RX ADMIN — ONDANSETRON 4 MG: 2 INJECTION INTRAMUSCULAR; INTRAVENOUS at 02:45

## 2021-07-10 RX ADMIN — ONDANSETRON 4 MG: 2 INJECTION INTRAMUSCULAR; INTRAVENOUS at 04:43

## 2021-07-10 RX ADMIN — IOPAMIDOL 100 ML: 612 INJECTION, SOLUTION INTRAVENOUS at 03:39

## 2021-07-10 RX ADMIN — HYDROMORPHONE HYDROCHLORIDE 1 MG: 1 INJECTION, SOLUTION INTRAMUSCULAR; INTRAVENOUS; SUBCUTANEOUS at 03:46

## 2021-07-10 RX ADMIN — TETANUS TOXOID, REDUCED DIPHTHERIA TOXOID AND ACELLULAR PERTUSSIS VACCINE, ADSORBED 0.5 ML: 5; 2.5; 8; 8; 2.5 SUSPENSION INTRAMUSCULAR at 04:37

## 2021-07-10 RX ADMIN — MORPHINE SULFATE 4 MG: 4 INJECTION, SOLUTION INTRAMUSCULAR; INTRAVENOUS at 02:45
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
## 2021-07-12 LAB — HBA1C MFR BLD HPLC: 9 %

## 2021-07-19 DIAGNOSIS — F90.9 ATTENTION DEFICIT HYPERACTIVITY DISORDER (ADHD), UNSPECIFIED ADHD TYPE: ICD-10-CM

## 2021-07-19 NOTE — TELEPHONE ENCOUNTER
[facility] date for Adderall as 6/11/21 for a 30 d/s. Last Visit: 9/21/20 with NP Andrei Cherry  Next Appointment: none  Previous Refill Encounter(s): 6/10/21 #60    Requested Prescriptions     Pending Prescriptions Disp Refills    dextroamphetamine-amphetamine (ADDERALL) 20 mg tablet 60 Tablet 0     Sig: Take 1 Tablet by mouth two (2) times a day. Max Daily Amount: 40 mg.

## 2021-07-19 NOTE — TELEPHONE ENCOUNTER
Patient requesting med refill    Requested Prescriptions     Pending Prescriptions Disp Refills    dextroamphetamine-amphetamine (ADDERALL) 20 mg tablet 60 Tablet 0     Sig: Take 1 Tablet by mouth two (2) times a day. Max Daily Amount: 40 mg.

## 2021-07-20 ENCOUNTER — TELEPHONE (OUTPATIENT)
Dept: FAMILY MEDICINE CLINIC | Age: 31
End: 2021-07-20

## 2021-07-20 NOTE — TELEPHONE ENCOUNTER
Left message to return call  - [facility] appointment has been scheduled for 07/22/21 at 08:40 am with Dr. Deni Carter. Radiology Pre-Procedure Note     Patient: Paulo Navarro Date: 2017   : 1947 Attending: Andrew Ivy MD   69 year old male      Chief Complaint:  Chief Complaint   Patient presents with   • Rectal Problem     Chief Complaint   Patient presents with   • Rectal Problem       Pre-Op Diagnosis:  Ascites    Patient Active Problem List    Diagnosis Date Noted   • Increased ammonia level 2016     Priority: Low   • Long term (current) use of anticoagulants 2015     Priority: Low   • Encounter for therapeutic drug monitoring 2015     Priority: Low   • HTN (hypertension) 2012     Priority: Low   • Obstructive sleep apnea 2012     Priority: Low   • Hyperlipidemia 2012     Priority: Low   • COPD (chronic obstructive pulmonary disease) 2012     Priority: Low   • Impaired fasting glucose 2012     Priority: Low   • Alcoholic cirrhosis 2012     Priority: Low   • Upper GI bleed 2017   • Perirectal abscess 2017   • Cellulitis of left leg 2016   • Atrial fibrillation, unspecified type 2016   • Atrial fibrillation 2015   • Esophageal varices 2015   • Preop cardiovascular exam 2014   • Lung mass 2014   • Melena 2014   • Chronic gastritis 09/10/2012   • Portal hypertensive gastropathy 09/10/2012   • GERD (gastroesophageal reflux disease) 09/10/2012     Past Medical History   Diagnosis Date   • Acne rosacea    • Alcoholic cirrhosis    • Alcoholism    • Aortic stenosis, mild      mild to moderate   • Atrial fibrillation    • Bilateral cataracts    • Chronic gastritis    • Chronic pain    • Colon polyps    • COPD (chronic obstructive pulmonary disease)    • Diastasis recti    • Diastolic dysfunction    • Diverticular disease    • Fracture    • GERD (gastroesophageal reflux disease)    • Hiatus hernia syndrome    • History of normal resting EKG    • Hyperlipidemia    • Internal hemorrhoids    • Lung abnormality      spot  on the lung   • Malignant neoplasm    • Morbid obesity    • Nicotine dependence    • CRISSY (obstructive sleep apnea)      cpap with 2 L oxygen   • Osteoarthritis    • Portal hypertensive gastropathy    • PSA elevation    • Umbilical hernia    • Umbilical hernia       Past Surgical History   Procedure Laterality Date   • Colonoscopy diagnostic  01/01/2007     Colonoscopy, Dx   • Colonoscopy  10/17/2007   • Esophagogastroduodenoscopy transoral flex diag  12/28/2010     EGD w/o Bx   • Lesion destruction  12/23/2011     ESOPHAGUS   • Total hip arthroplasty       left   • Total knee replacement       left   • Rotator cuff repair       right   • Esophageal varice ligation     • Joint replacement     • Eye surgery       cataract removed   • Esophagogastroduodenoscopy  9/1/14     Varices   • Lung biopsy     • Esophagogastroduodenoscopy  11/7/14     Per Dr. Shah, pt to repeat in 6 months.   • Esophagogastroduodenoscopy  12/8/14   • Esophagogastroduodenoscopy  6/2015     repeat June 2016 per Dr. Shah   • Incision and drainage perirectal abscess  10/09/2016      Social History   Substance Use Topics   • Smoking status: Current Every Day Smoker     Packs/day: 1.00     Years: 45.00     Types: Cigarettes   • Smokeless tobacco: Never Used      Comment: declines information   • Alcohol use 8.4 oz/week     14 Cans of beer per week      Family History   Problem Relation Age of Onset   • Cancer Father      esophagus   • Diabetes Paternal Grandmother       Prescriptions Prior to Admission   Medication Sig Dispense Refill   • warfarin (COUMADIN) 2.5 MG tablet Takes 1 tablet 2.5mg Sunday, Tuesday, Wednesday, Thursday, Friday, and Saturday. Take 1 1/2 tablet 3.75mg on Monday     • furosemide (LASIX) 40 MG tablet Take 1 tablet by mouth daily. 30 tablet 2   • traMADOL (ULTRAM) 50 MG tablet Take 1 tablet by mouth every 6 hours as needed for Pain. Indications: Moderate to Moderately Severe Pain 120 tablet 5   • lactulose (CHRONULAC) 10  GM/15ML solution Take 20 g by mouth nightly.     • spironolactone (ALDACTONE) 100 MG tablet Take 1 tablet by mouth daily. 30 tablet 2   • Cholecalciferol (VITAMIN D3) 1000 UNITS Cap Take 1 capsule by mouth daily.       Current Facility-Administered Medications   Medication   • lidocaine-prilocaine (EMLA) cream 1 application   • lidocaine 1 % injection 5-10 mg   • insulin regular (human) (HumuLIN R, NovoLIN R) sliding scale injection   • HYDROcodone-acetaminophen (NORCO) 5-325 MG per tablet 1-2 tablet   • sodium chloride (PF) 0.9 % injection 2 mL   • sodium chloride (PF) 0.9 % injection 2 mL   • ondansetron (ZOFRAN) injection 4 mg   • pantoprazole (PROTONIX) 80 mg in sodium chloride 0.9 % 100 mL infusion   • morphine injection 2 mg   • piperacillin-tazobactam (ZOSYN) 3.375 g in sodium chloride 0.9 % 100 mL IVPB   • acetaminophen (TYLENOL) tablet 650 mg   • fluconazole (DIFLUCAN) tablet 100 mg     Current Medications Reviewed: Yes  ALLERGIES:   Allergen Reactions   • Perflutren Lipid Microsphere MYALGIA     Patient reported severe back and hip pain with Definity administration with 3 echocardiograms.        Current Allergies Reviewed:Yes    Review of Systems:  No chest pain or shortness of breath    Pregnancy Status: not possible   Last menstrual period: No LMP for male patient.    Laboratory Results:  Lab Results   Component Value Date    SODIUM 137 01/21/2017    POTASSIUM 4.2 01/21/2017    BUN 16 01/21/2017    CREATININE 0.61 (L) 01/21/2017    WBC 10.7 01/21/2017    HCT 33.4 (L) 01/21/2017    HGB 11.4 (L) 01/21/2017     01/21/2017    INR 1.8 01/19/2017    BILIRUBIN 3.1 (H) 01/19/2017    RAPDTR <0.02 01/19/2017    BNP 98 11/11/2016    GLUCOSE 174 (H) 01/21/2017    TSH 3.843 09/05/2015    MG 1.9 01/21/2017       Lab Results Reviewed: Yes    PHYSICAL ASSESSMENT  Vital signs in last 24 hours:  Temp:  [97.7 °F (36.5 °C)-98.6 °F (37 °C)] 97.7 °F (36.5 °C)  Pulse:  [76-97] 76  Resp:  [20-28] 20  BP:  (119-140)/(70-82) 121/70    No cellulitis over proposed paracentesis site    Planned Procedure:  Ultrasound guided paracentesis    The procedure, risks, benefits, and alternatives were discussed with the patient, who gives consent to proceed: Yes    A/P (DPI):  Paulo Navarro is a 69 year old male with ascites for ultrasound guided paracentesis.      Assessing Physician: Los Finley MD                        Time: 1:57 PM

## 2021-07-22 ENCOUNTER — VIRTUAL VISIT (OUTPATIENT)
Dept: FAMILY MEDICINE CLINIC | Age: 31
End: 2021-07-22

## 2021-07-22 RX ORDER — DEXTROAMPHETAMINE SACCHARATE, AMPHETAMINE ASPARTATE, DEXTROAMPHETAMINE SULFATE AND AMPHETAMINE SULFATE 5; 5; 5; 5 MG/1; MG/1; MG/1; MG/1
20 TABLET ORAL 2 TIMES DAILY
Qty: 60 TABLET | Refills: 0 | Status: SHIPPED | OUTPATIENT
Start: 2021-07-22 | End: 2021-09-03 | Stop reason: SDUPTHER

## 2021-08-18 ENCOUNTER — HOSPITAL ENCOUNTER (OUTPATIENT)
Dept: PHYSICAL THERAPY | Age: 31
Discharge: HOME | End: 2021-08-18
Payer: COMMERCIAL

## 2021-08-18 DIAGNOSIS — E10.10 DIABETIC KETOACIDOSIS WITHOUT COMA ASSOCIATED WITH TYPE 1 DIABETES MELLITUS (HCC): Primary | ICD-10-CM

## 2021-08-18 PROCEDURE — 97162 PT EVAL MOD COMPLEX 30 MIN: CPT

## 2021-08-18 PROCEDURE — 97530 THERAPEUTIC ACTIVITIES: CPT

## 2021-08-18 PROCEDURE — 97110 THERAPEUTIC EXERCISES: CPT
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
## 2021-08-25 ENCOUNTER — TELEPHONE (OUTPATIENT)
Dept: PHYSICAL THERAPY | Age: 31
End: 2021-08-25

## 2021-08-30 ENCOUNTER — APPOINTMENT (OUTPATIENT)
Dept: PHYSICAL THERAPY | Age: 31
End: 2021-08-30
Payer: COMMERCIAL

## 2021-08-30 ENCOUNTER — TELEPHONE (OUTPATIENT)
Dept: PHYSICAL THERAPY | Age: 31
End: 2021-08-30

## 2021-08-30 NOTE — PROGRESS NOTES
[facility] [employer] [facility]  [place]  [address] [facility]  [phone] fax    Physical Therapy Discharge Summary    Patient name: Chacha Sandhu Start of Care: 2021   Referral source: Gutierrez Zhang NP : 1990   Medical/Treatment Diagnosis: Left leg pain [M79.605]  Left arm pain [M79.602]  Payor: BLUE CROSS / Plan: [address] [place] [address] / Product Type: PPO /  Onset Date:date of injury 7/10/21     Prior Hospitalization: see medical history Provider#: [id number]   Medications: Verified on Patient Summary List    Comorbidities: type 1 diabetes; history of several LE injuries and right shoulder pain  Prior Level of Function:working full time for "[employer] on 2nd floor; enjoys fishing, riding 4 wheelers, swimming; lives in 1 story home with ramps and 4 stairs to enter    Visits from [facility] of Care: 1    Missed Visits: 3    Reporting Period : 2021 to 2021    Summary of Care:  Goal:Patient will be compliant with initial HEP to optimize therapy outcomes. Status at last note/certification: New goal-established at evaluation  Status at discharge: Unable to formally assess    Goal: Patient will improve FOTO score by at least 5 points in order to to demonstrate functional improvement. Status at last note/certification: New PGQD-XKTD=90  Status at discharge:Unable to formally assess    Goal:Patient will report no more than \"limited a little\" with \"walking one block\" with FOTO in order to demonstrate improved tolerance to ambulation. Status at last note/certification: New goal-\"limited at lot\"  Status at discharge:Unable to formally assess    Goal: Patient will improve left shoulder AROM to at least 130 deg flex and 90 deg abd in order to improve tolerance to reaching.   Status at last note/certification: New SSUM-210 deg  Status at discharge:Unable to formally assess    Goal:Patient will improve left hip flex strength to at least 4/5 with MMT in order to ambulate with increased ease. Status at last note/certification: New DNNF-8+/4  Status at discharge: Unable to formally assess    Goal:Patient will be able to maintain tandem stance for at least 30 sec bilaterally in order to demonstrate improved static standing balance. Status at last note/certification: 30 sec right foot posterior, 14 sec left foot posterior  Status at discharge: Unable to formally assess        ASSESSMENT/RECOMMENDATIONS: Patient did not return to skilled outpatient PT following initial evaluation. Unable to formally assess progress towards goals. Patient provided HEP at initial evaluation. Patient is being discharged at this time due to non-compliance with cancel/no show policy.  At this time patient is appropriate for discharge from skilled outpatient PT.    [x]Discontinue therapy: []Patient has reached or is progressing toward set goals      [x]Patient is non-compliant or has abdicated      []Due to lack of appreciable progress towards set goals    Sidon, PT 8/30/2021 10:16 AM

## 2021-09-02 ENCOUNTER — APPOINTMENT (OUTPATIENT)
Dept: PHYSICAL THERAPY | Age: 31
End: 2021-09-02

## 2021-09-03 DIAGNOSIS — F90.9 ATTENTION DEFICIT HYPERACTIVITY DISORDER (ADHD), UNSPECIFIED ADHD TYPE: ICD-10-CM

## 2021-09-03 NOTE — TELEPHONE ENCOUNTER
[facility]22/21 for a 30 d/s. Last Visit: 9/21/20 with NP Sarina Castano  Next Appointment: none  Previous Refill Encounter(s): 7/22/21 #60    Requested Prescriptions     Pending Prescriptions Disp Refills    dextroamphetamine-amphetamine (ADDERALL) 20 mg tablet 60 Tablet 0     Sig: Take 1 Tablet by mouth two (2) times a day. Max Daily Amount: 40 mg.

## 2021-09-07 ENCOUNTER — APPOINTMENT (OUTPATIENT)
Dept: PHYSICAL THERAPY | Age: 31
End: 2021-09-07

## 2021-09-07 RX ORDER — DEXTROAMPHETAMINE SACCHARATE, AMPHETAMINE ASPARTATE, DEXTROAMPHETAMINE SULFATE AND AMPHETAMINE SULFATE 5; 5; 5; 5 MG/1; MG/1; MG/1; MG/1
20 TABLET ORAL 2 TIMES DAILY
Qty: 60 TABLET | Refills: 0 | Status: SHIPPED | OUTPATIENT
Start: 2021-09-07 | End: 2021-10-08 | Stop reason: SDUPTHER

## 2021-09-09 ENCOUNTER — APPOINTMENT (OUTPATIENT)
Dept: PHYSICAL THERAPY | Age: 31
End: 2021-09-09

## 2021-09-14 ENCOUNTER — APPOINTMENT (OUTPATIENT)
Dept: PHYSICAL THERAPY | Age: 31
End: 2021-09-14

## 2021-09-16 ENCOUNTER — APPOINTMENT (OUTPATIENT)
Dept: PHYSICAL THERAPY | Age: 31
End: 2021-09-16

## 2021-09-20 ENCOUNTER — APPOINTMENT (OUTPATIENT)
Dept: PHYSICAL THERAPY | Age: 31
End: 2021-09-20

## 2021-09-22 ENCOUNTER — APPOINTMENT (OUTPATIENT)
Dept: PHYSICAL THERAPY | Age: 31
End: 2021-09-22

## 2021-09-27 ENCOUNTER — APPOINTMENT (OUTPATIENT)
Dept: PHYSICAL THERAPY | Age: 31
End: 2021-09-27

## 2021-09-30 ENCOUNTER — APPOINTMENT (OUTPATIENT)
Dept: PHYSICAL THERAPY | Age: 31
End: 2021-09-30

## 2021-10-08 DIAGNOSIS — F90.9 ATTENTION DEFICIT HYPERACTIVITY DISORDER (ADHD), UNSPECIFIED ADHD TYPE: ICD-10-CM

## 2021-10-08 NOTE — TELEPHONE ENCOUNTER
Please contact patient for scheduling. Thanks    Pt left a voice message requesting a refill. Last Visit: 09/21/20  NP Madina Pan  Next Appointment: Nothing scheduled   Previous refill encounter(s)   09/07/2021 Adderall #60     No access to      Requested Prescriptions     Pending Prescriptions Disp Refills    dextroamphetamine-amphetamine (ADDERALL) 20 mg tablet 60 Tablet 0     Sig: Take 1 Tablet by mouth two (2) times a day. Max Daily Amount: 40 mg.

## 2021-10-14 RX ORDER — DEXTROAMPHETAMINE SACCHARATE, AMPHETAMINE ASPARTATE, DEXTROAMPHETAMINE SULFATE AND AMPHETAMINE SULFATE 5; 5; 5; 5 MG/1; MG/1; MG/1; MG/1
20 TABLET ORAL 2 TIMES DAILY
Qty: 60 TABLET | Refills: 0 | Status: SHIPPED | OUTPATIENT
Start: 2021-10-14 | End: 2021-11-15 | Stop reason: SDUPTHER

## 2021-11-15 DIAGNOSIS — F90.9 ATTENTION DEFICIT HYPERACTIVITY DISORDER (ADHD), UNSPECIFIED ADHD TYPE: ICD-10-CM

## 2021-11-15 NOTE — TELEPHONE ENCOUNTER
[facility]derall as 10/14/21 for a 30 d/s. Last Visit: 9/21/20 with NP Nelly Joshi  Next Appointment: none  Previous Refill Encounter(s): 10/14/21 #60    Requested Prescriptions     Pending Prescriptions Disp Refills    dextroamphetamine-amphetamine (ADDERALL) 20 mg tablet 60 Tablet 0     Sig: Take 1 Tablet by mouth two (2) times a day. Max Daily Amount: 40 mg.

## 2021-11-19 RX ORDER — DEXTROAMPHETAMINE SACCHARATE, AMPHETAMINE ASPARTATE, DEXTROAMPHETAMINE SULFATE AND AMPHETAMINE SULFATE 5; 5; 5; 5 MG/1; MG/1; MG/1; MG/1
20 TABLET ORAL 2 TIMES DAILY
Qty: 60 TABLET | Refills: 0 | Status: SHIPPED | OUTPATIENT
Start: 2021-11-19 | End: 2021-12-20 | Stop reason: SDUPTHER

## 2021-12-20 ENCOUNTER — TELEPHONE (OUTPATIENT)
Dept: FAMILY MEDICINE CLINIC | Age: 31
End: 2021-12-20

## 2021-12-20 DIAGNOSIS — F90.9 ATTENTION DEFICIT HYPERACTIVITY DISORDER (ADHD), UNSPECIFIED ADHD TYPE: ICD-10-CM

## 2021-12-20 NOTE — TELEPHONE ENCOUNTER
Last Visit: 9/21/20 with ALEKSANDR Daley  Next Appointment: none  Previous refill encounter(s)   11/19/2021 Adderall #60     No access to      Requested Prescriptions     Pending Prescriptions Disp Refills    dextroamphetamine-amphetamine (ADDERALL) 20 mg tablet 60 Tablet 0     Sig: Take 1 Tablet by mouth two (2) times a day. Max Daily Amount: 40 mg.

## 2021-12-20 NOTE — TELEPHONE ENCOUNTER
Pt trans from center, per rep [place] pt screening marked pt for urgent appt for ear pain, spoke with pt, adv no appts available for today, offered next vv, pt declined states will go into be seen but would still prefer nurse call for triage.  Please adv [phone]

## 2021-12-20 NOTE — TELEPHONE ENCOUNTER
Called patient back who stated ear pain started on Thursday 12- and has gotten worse. No Saturday went to the pharmacy and bought some ear drops but now ear pain is worse, swollen and is unable to hear from that ear. Advised to go to Urgent care or ER for further evaluation of what is going on. Patient verbalized understanding and had no further questions at this time.

## 2021-12-23 RX ORDER — DEXTROAMPHETAMINE SACCHARATE, AMPHETAMINE ASPARTATE, DEXTROAMPHETAMINE SULFATE AND AMPHETAMINE SULFATE 5; 5; 5; 5 MG/1; MG/1; MG/1; MG/1
20 TABLET ORAL 2 TIMES DAILY
Qty: 60 TABLET | Refills: 0 | Status: SHIPPED | OUTPATIENT
Start: 2021-12-23 | End: 2022-01-25 | Stop reason: SDUPTHER

## 2021-12-23 NOTE — TELEPHONE ENCOUNTER
Please advise patient follow-up appointment is required prior to further refills after current 30-day supply.

## 2022-01-25 DIAGNOSIS — F90.9 ATTENTION DEFICIT HYPERACTIVITY DISORDER (ADHD), UNSPECIFIED ADHD TYPE: ICD-10-CM

## 2022-01-25 NOTE — TELEPHONE ENCOUNTER
[facility]derall as 12/23/21 for a 30 d/s. Last Visit: 9/21/20 with ALEKSANDR Torres  Next Appointment: 2/11/22 with ALEKSANDR Torres  Previous Refill Encounter(s): 12/23/21 #60    Requested Prescriptions     Pending Prescriptions Disp Refills    dextroamphetamine-amphetamine (ADDERALL) 20 mg tablet 60 Tablet 0     Sig: Take 1 Tablet by mouth two (2) times a day. Max Daily Amount: 40 mg.

## 2022-01-28 RX ORDER — DEXTROAMPHETAMINE SACCHARATE, AMPHETAMINE ASPARTATE, DEXTROAMPHETAMINE SULFATE AND AMPHETAMINE SULFATE 5; 5; 5; 5 MG/1; MG/1; MG/1; MG/1
20 TABLET ORAL 2 TIMES DAILY
Qty: 60 TABLET | Refills: 0 | Status: SHIPPED | OUTPATIENT
Start: 2022-01-28 | End: 2022-02-24 | Stop reason: SDUPTHER

## 2022-02-24 DIAGNOSIS — F90.9 ATTENTION DEFICIT HYPERACTIVITY DISORDER (ADHD), UNSPECIFIED ADHD TYPE: ICD-10-CM

## 2022-02-24 NOTE — TELEPHONE ENCOUNTER
[facility] Adderall as 1/28/22 for a 30 d/s. Last Visit: 9/21/20 with NP Mckinley Haynes  Next Appointment: 2/11/22 pt cancelled appt  Previous Refill Encounter(s): 1/28/22 #60    Requested Prescriptions     Pending Prescriptions Disp Refills    dextroamphetamine-amphetamine (ADDERALL) 20 mg tablet 60 Tablet 0     Sig: Take 1 Tablet by mouth two (2) times a day. Max Daily Amount: 40 mg.

## 2022-03-03 RX ORDER — DEXTROAMPHETAMINE SACCHARATE, AMPHETAMINE ASPARTATE, DEXTROAMPHETAMINE SULFATE AND AMPHETAMINE SULFATE 5; 5; 5; 5 MG/1; MG/1; MG/1; MG/1
20 TABLET ORAL 2 TIMES DAILY
Qty: 60 TABLET | Refills: 0 | Status: SHIPPED | OUTPATIENT
Start: 2022-03-03 | End: 2022-04-05 | Stop reason: SDUPTHER

## 2022-03-29 ENCOUNTER — TELEPHONE (OUTPATIENT)
Dept: FAMILY MEDICINE CLINIC | Age: 32
End: 2022-03-29

## 2022-03-29 NOTE — TELEPHONE ENCOUNTER
Spoke with patient to r/s appt due to provider being out of the office.  Appt details has been confirmed with patient

## 2022-04-05 DIAGNOSIS — F90.9 ATTENTION DEFICIT HYPERACTIVITY DISORDER (ADHD), UNSPECIFIED ADHD TYPE: ICD-10-CM

## 2022-04-05 NOTE — TELEPHONE ENCOUNTER
[facility]derall as 3/3/22 for a 30 d/s. Last Visit: 9/21/20 with ALEKSANDR Solitario  Next Appointment: 4/8/22 with ALEKSANDR Solitario  Previous Refill Encounter(s): 3/3/22 #60    Requested Prescriptions     Pending Prescriptions Disp Refills    dextroamphetamine-amphetamine (ADDERALL) 20 mg tablet 60 Tablet 0     Sig: Take 1 Tablet by mouth two (2) times a day. Max Daily Amount: 40 mg.

## 2022-04-05 NOTE — TELEPHONE ENCOUNTER
----- Message from Deangelo Burt sent at 4/5/2022 11:23 AM EDT -----  Subject: Refill Request    QUESTIONS  Name of Medication? dextroamphetamine-amphetamine (ADDERALL) 20 mg tablet  Patient-reported dosage and instructions? 20 mg, twice a day  How many days do you have left? 0  Preferred Pharmacy? [id number] Pharmacy phone number (if available)? [phone]  ---------------------------------------------------------------------------  --------------  CALL BACK INFO  What is the best way for the office to contact you? OK to leave message on   voicemail  Preferred Call Back Phone Number? [phone]  ---------------------------------------------------------------------------  --------------  SCRIPT ANSWERS  Relationship to Patient?  Self

## 2022-04-06 RX ORDER — DEXTROAMPHETAMINE SACCHARATE, AMPHETAMINE ASPARTATE, DEXTROAMPHETAMINE SULFATE AND AMPHETAMINE SULFATE 5; 5; 5; 5 MG/1; MG/1; MG/1; MG/1
20 TABLET ORAL 2 TIMES DAILY
Qty: 60 TABLET | Refills: 0 | Status: SHIPPED | OUTPATIENT
Start: 2022-04-06 | End: 2022-05-10 | Stop reason: SDUPTHER

## 2022-04-08 ENCOUNTER — OFFICE VISIT (OUTPATIENT)
Dept: FAMILY MEDICINE CLINIC | Age: 32
End: 2022-04-08
Payer: COMMERCIAL

## 2022-04-08 VITALS
HEART RATE: 121 BPM | SYSTOLIC BLOOD PRESSURE: 121 MMHG | BODY MASS INDEX: 26.13 KG/M2 | HEIGHT: 62 IN | WEIGHT: 142 LBS | OXYGEN SATURATION: 97 % | DIASTOLIC BLOOD PRESSURE: 76 MMHG | RESPIRATION RATE: 14 BRPM | TEMPERATURE: 98.8 F

## 2022-04-08 DIAGNOSIS — F90.9 ATTENTION DEFICIT HYPERACTIVITY DISORDER (ADHD), UNSPECIFIED ADHD TYPE: Primary | ICD-10-CM

## 2022-04-08 DIAGNOSIS — E10.65 TYPE 1 DIABETES MELLITUS WITH HYPERGLYCEMIA (HCC): ICD-10-CM

## 2022-04-08 DIAGNOSIS — Z87.81 H/O FRACTURE OF HUMERUS: ICD-10-CM

## 2022-04-08 PROCEDURE — 99213 OFFICE O/P EST LOW 20 MIN: CPT | Performed by: NURSE PRACTITIONER

## 2022-04-08 RX ORDER — DEXTROAMPHETAMINE SACCHARATE, AMPHETAMINE ASPARTATE, DEXTROAMPHETAMINE SULFATE AND AMPHETAMINE SULFATE 1.25; 1.25; 1.25; 1.25 MG/1; MG/1; MG/1; MG/1
5 TABLET ORAL 2 TIMES DAILY
Qty: 60 TABLET | Refills: 0 | Status: SHIPPED | OUTPATIENT
Start: 2022-04-08 | End: 2022-05-10 | Stop reason: SDUPTHER

## 2022-04-08 NOTE — PROGRESS NOTES
1. \"Have you been to the ER, urgent care clinic since your last visit? Hospitalized since your last visit? \" No    2. \"Have you seen or consulted any other health care providers outside of the [address] [place] [address] since your last visit? \" No     3. For patients aged 39-70: Has the patient had a colonoscopy / FIT/ Cologuard? NA - based on age      If the patient is female:    4. For patients aged 41-77: Has the patient had a mammogram within the past 2 years? NA - based on age or sex      11. For patients aged 21-65: Has the patient had a pap smear?  No

## 2022-04-08 NOTE — PROGRESS NOTES
Meena Camargo is a 33 y.o. female who was seen in clinic today (4/8/2022) for Follow-up, Medication Refill, and Arm Pain (left arm )    Assessment & Plan:   Diagnoses and all orders for this visit:    1. Attention deficit hyperactivity disorder (ADHD), unspecified ADHD type  -     dextroamphetamine-amphetamine (ADDERALL) 5 mg tablet; Take 1 Tablet by mouth two (2) times a day. Max Daily Amount: 10 mg.    2. Type 1 diabetes mellitus with hyperglycemia ([facility] [address]  Assessment & Plan:   monitored by specialist. No acute findings meriting change in the plan        3. H/O fracture of humerus  -     REFERRAL TO ORTHOPEDICS    Increase adderall to 25 mg bid. I have discussed the diagnosis with the patient and the intended plan as seen in the above orders. The patient has received an after-visit summary and questions were answered concerning future plans. I have discussed medication side effects and warnings with the patient as well. Patient agreeable with above plan and verbalizes understanding. Follow-up and Dispositions    · Return in about 4 weeks (around 5/6/2022) for ADD since medication adjustment, virtual follow up. Subjective:   Patient has been has been experiencing left arm pain that began. Patient reports in July 2022 she broke her left leg and left arm when a golf cart fell on her. Comments she has limited ROM in her left arm. States she did not complete PT due to having to return to work. Comments she has been having pain however, hasn't followed up with the orthopedist.  ADHD  The patient is a 33 y.o. female who is seen for follow up of ADHD. Current ADHD and related symptoms: inattention. Symptoms are reports she seems like she could use a slight increase, medication seems to wear off a little sooner. Current medication compliance: all of the time. She is tolerating current treatment well and denies none.     Lab Results   Component Value Date/Time    Sodium 139 07/10/2021 02:40 AM Potassium 3.8 07/10/2021 02:40 AM    Chloride 105 07/10/2021 02:40 AM    CO2 27 07/10/2021 02:40 AM    Anion gap 7 07/10/2021 02:40 AM    Glucose 317 (H) 07/10/2021 02:40 AM    BUN 9 07/10/2021 02:40 AM    Creatinine 0.56 (L) 07/10/2021 02:40 AM    BUN/Creatinine ratio 16 07/10/2021 02:40 AM    GFR est AA >60 07/10/2021 02:40 AM    GFR est non-AA >60 07/10/2021 02:40 AM    Calcium 8.9 07/10/2021 02:40 AM    Bilirubin, total 1.0 01/29/2018 12:15 PM    Alk. phosphatase 41 (L) 01/29/2018 12:15 PM    Protein, total 7.3 01/29/2018 12:15 PM    Albumin 4.0 01/29/2018 12:15 PM    Globulin 3.3 01/29/2018 12:15 PM    A-G Ratio 1.2 01/29/2018 12:15 PM    ALT (SGPT) 19 01/29/2018 12:15 PM    AST (SGOT) 9 (L) 01/29/2018 12:15 PM     Lab Results   Component Value Date/Time    Cholesterol, total 199 (H) 06/12/2015 10:40 AM    HDL Cholesterol 90 06/12/2015 10:40 AM    LDL, calculated 101 06/12/2015 10:40 AM    Triglyceride 42 06/12/2015 10:40 AM     Lab Results   Component Value Date/Time    Hemoglobin A1c 11.5 (H) 05/14/2015 12:26 PM    Hemoglobin A1c, External 9.0 07/12/2021 12:00 AM     Lab Results   Component Value Date/Time    VITAMIN D, 25-HYDROXY 34.6 05/14/2015 12:26 PM       Lab Results   Component Value Date/Time    WBC 6.3 07/10/2021 02:40 AM    HGB 15.3 07/10/2021 02:40 AM    HCT 45.7 (H) 07/10/2021 02:40 AM    PLATELET 909 01/76/7641 02:40 AM    MCV 98.3 (H) 07/10/2021 02:40 AM       Wt Readings from Last 3 Encounters:   02/28/20 127 lb (57.6 kg)   10/16/19 124 lb (56.2 kg)   05/17/19 126 lb (57.2 kg)     Temp Readings from Last 3 Encounters:   07/10/21 97.9 °F (36.6 °C)   02/28/20 97.7 °F (36.5 °C) (Oral)   10/16/19 98.2 °F (36.8 °C) (Oral)     BP Readings from Last 3 Encounters:   07/10/21 131/82   02/28/20 108/64   10/16/19 119/76     Pulse Readings from Last 3 Encounters:   07/10/21 (!) 132   02/28/20 100   10/16/19 (!) 115     Prior to Admission medications    Medication Sig Start Date End Date Taking?  Authorizing Provider   dextroamphetamine-amphetamine (ADDERALL) 20 mg tablet Take 1 Tablet by mouth two (2) times a day. Max Daily Amount: 40 mg. 4/6/22   Jennifer MENDOZA NP   ketoconazole (NIZORAL) 2 % topical cream Apply  to affected area daily. 1/6/21   Adalgisa Linn NP   albuterol (PROVENTIL HFA, VENTOLIN HFA, PROAIR HFA) 90 mcg/actuation inhaler Take 2 Puffs by inhalation every four (4) hours as needed for Wheezing, Shortness of Breath or Cough. 1/6/21   Adalgisa Linn NP   omeprazole (PRILOSEC) 20 mg capsule TAKE 1 CAP BY MOUTH DAILY (BEFORE BREAKFAST). 11/11/20   Jennifer MENDOZA NP   LANTUS SOLOSTAR U-100 INSULIN 100 unit/mL (3 mL) inpn INJECT 30 UNITS DAILY IN CASE OF PUMP FAILURE 9/19/19   Provider, Historical   ibuprofen (MOTRIN) 800 mg tablet Take 1 Tab by mouth every eight (8) hours as needed for Pain (take with food or milk). 10/16/19   Jennifer MENDOZA NP    insulin pump (PATIENT SUPPLIED) misc by SubCUTAneous route as needed. Provider, Historical   insulin lispro (HUMALOG) 100 unit/mL injection by SubCUTAneous route. Other, MD Patricia     The following sections were reviewed & updated as appropriate: PMH, PSH, FH, and SH. Review of Systems   Constitutional: Negative for activity change, appetite change, chills, fatigue and fever. Respiratory: Negative for chest tightness and shortness of breath. Cardiovascular: Negative for chest pain. Musculoskeletal: Positive for arthralgias (left upper arm pain). Neurological: Negative for dizziness and headaches. Objective:     Visit Vitals  /76 (BP 1 Location: Left upper arm, BP Patient Position: Sitting)   Pulse (!) 121   Temp 98.8 °F (37.1 °C) (Temporal)   Resp 14   Ht 5' 2\" (1.575 m)   Wt 142 lb (64.4 kg)   LMP 04/08/2022   SpO2 97%   BMI 25.97 kg/m²      Physical Exam  Constitutional:       General: She is not in acute distress. Appearance: She is well-developed. HENT:      Head: Normocephalic and atraumatic.    Neck:      Vascular: No carotid bruit. Cardiovascular:      Rate and Rhythm: Normal rate and regular rhythm. Heart sounds: Normal heart sounds. No murmur heard. No friction rub. No gallop. Pulmonary:      Effort: Pulmonary effort is normal.      Breath sounds: Normal breath sounds. No decreased breath sounds, wheezing, rhonchi or rales. Abdominal:      General: Bowel sounds are normal.      Palpations: Abdomen is soft. Tenderness: There is no abdominal tenderness. Musculoskeletal:      Left upper arm: Tenderness present. Cervical back: Normal range of motion and neck supple. Lymphadenopathy:      Cervical: No cervical adenopathy. Skin:     General: Skin is warm and dry. Neurological:      Mental Status: She is alert and oriented to person, place, and time. Disclaimer: The patient understands our medical plan. Alternatives have been explained and offered. The risks, benefits and significant side effects of all medications have been reviewed. Anticipated time course and progression of condition reviewed. All questions have been addressed. She is encouraged to employ the information provided in the after visit summary, which was reviewed. Where applicable, she is instructed to call the clinic if she has not been notified either by phone or through [address] with the results of her tests or with an appointment plan for any referrals within 1 week(s). No news is not good news; it's no news. The patient  is to call if her condition worsens or fails to improve or if significant side effects are experienced. Aspects of this note may have been generated using voice recognition software. Despite editing, there may be unrecognized errors.        Luis F Rivas NP

## 2022-04-08 NOTE — LETTER
NOTIFICATION RETURN TO WORK / SCHOOL    4/8/2022 4:42 PM    Ms. Mack Mcwilliams  [address] [facility] [facility] [id number]      To Whom It May Concern:    Mack Mcwilliams [facility] She will return to work/school on: 4/9/2022    If there are questions or concerns please have the patient contact our office.         Sincerely,      Becki Chapman NP

## 2022-04-11 PROBLEM — E10.65 TYPE 1 DIABETES MELLITUS WITH HYPERGLYCEMIA (HCC): Status: ACTIVE | Noted: 2022-04-08

## 2022-05-10 ENCOUNTER — VIRTUAL VISIT (OUTPATIENT)
Dept: FAMILY MEDICINE CLINIC | Age: 32
End: 2022-05-10
Payer: COMMERCIAL

## 2022-05-10 DIAGNOSIS — F90.9 ATTENTION DEFICIT HYPERACTIVITY DISORDER (ADHD), UNSPECIFIED ADHD TYPE: ICD-10-CM

## 2022-05-10 PROCEDURE — 99212 OFFICE O/P EST SF 10 MIN: CPT | Performed by: NURSE PRACTITIONER

## 2022-05-10 RX ORDER — INSULIN LISPRO 100 [IU]/ML
INJECTION, SOLUTION INTRAVENOUS; SUBCUTANEOUS
COMMUNITY
Start: 2021-07-13 | End: 2022-08-31 | Stop reason: ALTCHOICE

## 2022-05-10 RX ORDER — DEXTROAMPHETAMINE SACCHARATE, AMPHETAMINE ASPARTATE, DEXTROAMPHETAMINE SULFATE AND AMPHETAMINE SULFATE 5; 5; 5; 5 MG/1; MG/1; MG/1; MG/1
20 TABLET ORAL 2 TIMES DAILY
Qty: 60 TABLET | Refills: 0 | Status: SHIPPED | OUTPATIENT
Start: 2022-05-10 | End: 2022-06-09 | Stop reason: SDUPTHER

## 2022-05-10 RX ORDER — DEXTROAMPHETAMINE SACCHARATE, AMPHETAMINE ASPARTATE, DEXTROAMPHETAMINE SULFATE AND AMPHETAMINE SULFATE 1.25; 1.25; 1.25; 1.25 MG/1; MG/1; MG/1; MG/1
5 TABLET ORAL 2 TIMES DAILY
Qty: 60 TABLET | Refills: 0 | Status: SHIPPED | OUTPATIENT
Start: 2022-05-10 | End: 2022-06-09 | Stop reason: SDUPTHER

## 2022-05-10 RX ORDER — INSULIN LISPRO 100 [IU]/ML
INJECTION, SOLUTION INTRAVENOUS; SUBCUTANEOUS
Status: ON HOLD | COMMUNITY
Start: 2022-02-05 | End: 2022-09-23 | Stop reason: SDUPTHER

## 2022-05-10 RX ORDER — INSULIN GLARGINE 100 [IU]/ML
50 INJECTION, SOLUTION SUBCUTANEOUS
Status: ON HOLD | COMMUNITY
Start: 2021-07-13 | End: 2022-09-23 | Stop reason: SDUPTHER

## 2022-05-10 RX ORDER — OMEPRAZOLE 20 MG/1
20 CAPSULE, DELAYED RELEASE ORAL
Qty: 90 CAPSULE | Refills: 0 | Status: ON HOLD | OUTPATIENT
Start: 2022-05-10 | End: 2022-09-23 | Stop reason: SDUPTHER

## 2022-05-10 NOTE — PROGRESS NOTES
Pharmacy Note  ED Culture Follow-up    Harish Velez is a 38 y.o. female. Allergies: Patient has no known allergies. Labs:  Lab Results   Component Value Date    BUN 18 02/12/2014    CREATININE 0.6 02/12/2014    WBC 13.8 (H) 02/12/2014     CrCl cannot be calculated (Patient's most recent lab result is older than the maximum 10 days allowed. ). Current antimicrobials:   Bactrim + cephalexin    ASSESSMENT:  Micro results:   Wound culture: positive for staph      PLAN:  Need for intervention: No 2/2 on abx   Discussed with: Jules Mccloud PA-C  Chosen treatment:    Patient already on appropriate treatment as above    Patient response:   No need to contact patient    Called/sent in prescription to: Not applicable    Please call with any questions.  Ext. [id number] Ruth Malin (: 1990) is a 33 y.o. female, established patient, here for evaluation of the following chief complaint(s):   Medication Management (Marisa Motta )           Ruth Malin, was evaluated through a synchronous (real-time) audio-video encounter. The patient (or guardian if applicable) is aware that this is a billable service, which includes applicable co-pays. Verbal consent to proceed has been obtained. The visit was conducted pursuant to the emergency declaration under the [address] [place] [address], [address] [place] [address] authority and the [employer] General Act. Patient identification was verified, and a caregiver was present when appropriate. The patient was located at home in a state where the provider was licensed to provide care. An electronic signature was used to authenticate this note.   -- Stephanie Abbott

## 2022-05-10 NOTE — PROGRESS NOTES
Jerardo Davidson is a 33 y.o. female who was seen by synchronous (real-time) audio-video technology on 5/10/2022 for Medication Management (Adderrall )    Assessment & Plan:     Diagnoses and all orders for this visit:    1. Attention deficit hyperactivity disorder (ADHD), unspecified ADHD type  -     dextroamphetamine-amphetamine (ADDERALL) 20 mg tablet; Take 1 Tablet by mouth two (2) times a day. Max Daily Amount: 40 mg.  -     dextroamphetamine-amphetamine (ADDERALL) 5 mg tablet; Take 1 Tablet by mouth two (2) times a day. Max Daily Amount: 10 mg. Other orders  -     omeprazole (PRILOSEC) 20 mg capsule; Take 1 Capsule by mouth Daily (before breakfast). Follow-up and Dispositions    · Return in about 6 months (around 11/10/2022) for ADD, in office follow up. I spent at least 10 minutes on this visit with this established patient. [id number]  Subjective:   Patient states since increase in Adderall to 25 mg BID her symptoms have been better controlled. Denies any adverse effects with taking medication. 4/8/2022  Subjective:   Patient has been has been experiencing left arm pain that began. Patient reports in July 2022 she broke her left leg and left arm when a golf cart fell on her. Comments she has limited ROM in her left arm. States she did not complete PT due to having to return to work. Comments she has been having pain however, hasn't followed up with the orthopedist.  ADHD  The patient is a 33 y.o. female who is seen for follow up of ADHD. Current ADHD and related symptoms: inattention. Symptoms are reports she seems like she could use a slight increase, medication seems to wear off a little sooner. Current medication compliance: all of the time. She is tolerating current treatment well and denies none. Assessment & Plan:   Diagnoses and all orders for this visit:    1.  Attention deficit hyperactivity disorder (ADHD), unspecified ADHD type  -     dextroamphetamine-amphetamine (ADDERALL) 5 mg tablet; Take 1 Tablet by mouth two (2) times a day. Max Daily Amount: 10 mg.    2. Type 1 diabetes mellitus with hyperglycemia ([address]  Assessment & Plan:   monitored by specialist. No acute findings meriting change in the plan        3. H/O fracture of humerus  -     REFERRAL TO ORTHOPEDICS    Increase adderall to 25 mg bid. I have discussed the diagnosis with the patient and the intended plan as seen in the above orders. The patient has received an after-visit summary and questions were answered concerning future plans. I have discussed medication side effects and warnings with the patient as well. Patient agreeable with above plan and verbalizes understanding. Follow-up and Dispositions    · Return in about 4 weeks (around 5/6/2022) for ADD since medication adjustment, virtual follow up. Prior to Admission medications    Medication Sig Start Date End Date Taking? Authorizing Provider   insulin glargine (LANTUS) 100 unit/mL injection 20 Units by SubCUTAneous route nightly. 7/13/21  Yes Provider, Historical   HumaLOG KwikPen Insulin 100 unit/mL kwikpen INJECT AS INSTRUCTED (MAX OF 50 UNITS PER DAY) 2/5/22  Yes Provider, Historical   insulin lispro (HUMALOG) 100 unit/mL injection Inject as instructed (max of 50 units per day)  Indications: huamlog in pump 7/13/21  Yes Provider, Historical   dextroamphetamine-amphetamine (ADDERALL) 5 mg tablet Take 1 Tablet by mouth two (2) times a day. Max Daily Amount: 10 mg. 4/8/22  Yes Maria Esther Mcconnell NP   dextroamphetamine-amphetamine (ADDERALL) 20 mg tablet Take 1 Tablet by mouth two (2) times a day. Max Daily Amount: 40 mg. 4/6/22  Yes Maria Esther Mcconnell NP   albuterol (PROVENTIL HFA, VENTOLIN HFA, PROAIR HFA) 90 mcg/actuation inhaler Take 2 Puffs by inhalation every four (4) hours as needed for Wheezing, Shortness of Breath or Cough. 1/6/21  Yes Chris Black NP   omeprazole (PRILOSEC) 20 mg capsule TAKE 1 CAP BY MOUTH DAILY (BEFORE BREAKFAST). 11/11/20  Yes ALEKSANDR Coronel SOLOSTAR U-100 INSULIN 100 unit/mL (3 mL) inpn INJECT 30 UNITS DAILY IN CASE OF PUMP FAILURE 9/19/19  Yes Provider, Historical   insulin lispro (HUMALOG) 100 unit/mL injection by SubCUTAneous route. Yes Other, MD Patricia   ketoconazole (NIZORAL) 2 % topical cream Apply  to affected area daily. Patient not taking: Reported on 5/10/2022 1/6/21   Raman Topete NP   ibuprofen (MOTRIN) 800 mg tablet Take 1 Tab by mouth every eight (8) hours as needed for Pain (take with food or milk). Patient not taking: Reported on 5/10/2022 10/16/19   Isiah Andrade NP    insulin pump (PATIENT SUPPLIED) misc by SubCUTAneous route as needed. Patient not taking: Reported on 5/10/2022    Provider, Historical     Patient Active Problem List   Diagnosis Code    DKA (diabetic ketoacidoses) E11.10    Type 1 diabetes mellitus with hyperglycemia ([facility] [address] E10.65     Patient Active Problem List    Diagnosis Date Noted    Type 1 diabetes mellitus with hyperglycemia ([address] 04/08/2022    DKA (diabetic ketoacidoses) 02/10/2017     No Known Allergies  Past Medical History:   Diagnosis Date    ADHD (attention deficit hyperactivity disorder)     Diabetes mellitus type 1 ([address]     Endometriosis      Past Surgical History:   Procedure Laterality Date    HX BACK SURGERY  5/23/2014    lower back     HX ORTHOPAEDIC  5/23/2014    right medial ankle     HX OTHER SURGICAL Left     pt had a gail put into left leg     Family History   Problem Relation Age of Onset    Arthritis-rheumatoid Paternal [de-identified]     Hypertension Mother     Diabetes Maternal Grandmother         type 2    Heart Disease Neg Hx      Social History     Tobacco Use    Smoking status: Light Tobacco Smoker    Smokeless tobacco: Never Used   Substance Use Topics    Alcohol use:  Yes     Alcohol/week: 4.0 standard drinks     Types: 4 Standard drinks or equivalent per week     Comment: occasionally        ROS    Objective:   No flowsheet data found. General: alert, cooperative, no distress   Mental  status: normal mood, behavior, speech, dress, motor activity, and thought processes, able to follow commands   HENT: NCAT   Neck: no visualized mass   Resp: no respiratory distress   Neuro: no gross deficits   Skin: no discoloration or lesions of concern on visible areas   Psychiatric: normal affect, consistent with stated mood, no evidence of hallucinations     Additional exam findings: We discussed the expected course, resolution and complications of the diagnosis(es) in detail. Medication risks, benefits, costs, interactions, and alternatives were discussed as indicated. I advised her to contact the office if her condition worsens, changes or fails to improve as anticipated. She expressed understanding with the diagnosis(es) and plan. Pawel Holt, was evaluated through a synchronous (real-time) audio-video encounter. The patient (or guardian if applicable) is aware that this is a billable service, which includes applicable co-pays. Verbal consent to proceed has been obtained. The visit was conducted pursuant to the emergency declaration under the [address] [place] [address] authority and the [employer] General Act. Patient identification was verified, and a caregiver was present when appropriate. The patient was located at home in a state where the provider was licensed to provide care.     Yanely Brandon NP

## 2022-06-09 DIAGNOSIS — F90.9 ATTENTION DEFICIT HYPERACTIVITY DISORDER (ADHD), UNSPECIFIED ADHD TYPE: ICD-10-CM

## 2022-06-09 NOTE — TELEPHONE ENCOUNTER
[facility] date for Adderall 5mg as 5/18/22 for a 30 d/s & Adderall 20mg as 5/10/22 for a 30 d/s. Last Visit: 5/10/22 with NP Buck Downs  Next Appointment: Advised to follow-up in 6 months  Previous Refill Encounter(s): 5/10/22 #60    Requested Prescriptions     Pending Prescriptions Disp Refills    dextroamphetamine-amphetamine (ADDERALL) 20 mg tablet 60 Tablet 0     Sig: Take 1 Tablet by mouth two (2) times a day. Max Daily Amount: 40 mg.    dextroamphetamine-amphetamine (ADDERALL) 5 mg tablet 60 Tablet 0     Sig: Take 1 Tablet by mouth two (2) times a day. Max Daily Amount: 10 mg.          For Shai Sanon in place:    Recommendation Provided To:    Intervention Detail: New Rx: 2, reason: Patient Preference   Gap Closed?:    Intervention Accepted By:   Pili Garber Time Spent (min): 5

## 2022-06-14 RX ORDER — DEXTROAMPHETAMINE SACCHARATE, AMPHETAMINE ASPARTATE, DEXTROAMPHETAMINE SULFATE AND AMPHETAMINE SULFATE 1.25; 1.25; 1.25; 1.25 MG/1; MG/1; MG/1; MG/1
5 TABLET ORAL 2 TIMES DAILY
Qty: 60 TABLET | Refills: 0 | Status: SHIPPED | OUTPATIENT
Start: 2022-06-14 | End: 2022-07-19 | Stop reason: SDUPTHER

## 2022-06-14 RX ORDER — DEXTROAMPHETAMINE SACCHARATE, AMPHETAMINE ASPARTATE, DEXTROAMPHETAMINE SULFATE AND AMPHETAMINE SULFATE 5; 5; 5; 5 MG/1; MG/1; MG/1; MG/1
20 TABLET ORAL 2 TIMES DAILY
Qty: 60 TABLET | Refills: 0 | Status: SHIPPED | OUTPATIENT
Start: 2022-06-14 | End: 2022-07-19 | Stop reason: SDUPTHER

## 2022-07-19 DIAGNOSIS — F90.9 ATTENTION DEFICIT HYPERACTIVITY DISORDER (ADHD), UNSPECIFIED ADHD TYPE: ICD-10-CM

## 2022-07-19 NOTE — TELEPHONE ENCOUNTER
[facility] date for Adderall 20mg as 6/14/22 for a 30 d/s & Adderall 5mg as 6/16/22 for a 30 d/s. Last Visit: 5/10/22 with NP Fernanda Daley  Next Appointment: Advised to follow-up in 6 months  Previous Refill Encounter(s): 6/14/22    Requested Prescriptions     Pending Prescriptions Disp Refills    dextroamphetamine-amphetamine (ADDERALL) 20 mg tablet 60 Tablet 0     Sig: Take 1 Tablet by mouth two (2) times a day. Max Daily Amount: 40 mg.    dextroamphetamine-amphetamine (ADDERALL) 5 mg tablet 60 Tablet 0     Sig: Take 1 Tablet by mouth two (2) times a day. Max Daily Amount: 10 mg.          For [facility] in place:    Recommendation Provided To:    Intervention Detail: New Rx: 2, reason: Patient Preference   Gap Closed?:    Intervention Accepted By:   [facility] Time Spent (min): 5

## 2022-07-20 RX ORDER — DEXTROAMPHETAMINE SACCHARATE, AMPHETAMINE ASPARTATE, DEXTROAMPHETAMINE SULFATE AND AMPHETAMINE SULFATE 5; 5; 5; 5 MG/1; MG/1; MG/1; MG/1
20 TABLET ORAL 2 TIMES DAILY
Qty: 60 TABLET | Refills: 0 | Status: SHIPPED | OUTPATIENT
Start: 2022-07-20 | End: 2022-08-31 | Stop reason: SDUPTHER

## 2022-07-20 RX ORDER — DEXTROAMPHETAMINE SACCHARATE, AMPHETAMINE ASPARTATE, DEXTROAMPHETAMINE SULFATE AND AMPHETAMINE SULFATE 1.25; 1.25; 1.25; 1.25 MG/1; MG/1; MG/1; MG/1
5 TABLET ORAL 2 TIMES DAILY
Qty: 60 TABLET | Refills: 0 | Status: SHIPPED | OUTPATIENT
Start: 2022-07-20 | End: 2022-08-31 | Stop reason: SDUPTHER

## 2022-07-28 ENCOUNTER — TELEPHONE (OUTPATIENT)
Dept: FAMILY MEDICINE CLINIC | Age: 32
End: 2022-07-28

## 2022-07-28 NOTE — TELEPHONE ENCOUNTER
Not able to leave message for pt to call office and schedule in office visit and labs 1 week prior mailbox full. Valeria Birmingham

## 2022-08-31 ENCOUNTER — VIRTUAL VISIT (OUTPATIENT)
Dept: FAMILY MEDICINE CLINIC | Age: 32
End: 2022-08-31
Payer: COMMERCIAL

## 2022-08-31 DIAGNOSIS — L02.214 ABSCESS OF GROIN, LEFT: Primary | ICD-10-CM

## 2022-08-31 DIAGNOSIS — F90.9 ATTENTION DEFICIT HYPERACTIVITY DISORDER (ADHD), UNSPECIFIED ADHD TYPE: ICD-10-CM

## 2022-08-31 PROCEDURE — 99213 OFFICE O/P EST LOW 20 MIN: CPT | Performed by: NURSE PRACTITIONER

## 2022-08-31 RX ORDER — SULFAMETHOXAZOLE AND TRIMETHOPRIM 800; 160 MG/1; MG/1
1 TABLET ORAL 2 TIMES DAILY
Qty: 20 TABLET | Refills: 0 | OUTPATIENT
Start: 2022-08-31 | End: 2022-09-03

## 2022-08-31 NOTE — LETTER
NOTIFICATION RETURN TO WORK / SCHOOL    8/31/2022     Ms. Anatoly Kam  [facility] [address]      To Whom It May Concern:    Malcolm Bush is currently under the care of [address] She will return to work/school on: 9/1/2022    If there are questions or concerns please have the patient contact our office.         Sincerely,      Ciro Franco NP

## 2022-08-31 NOTE — PROGRESS NOTES
Kathie Antony is a 33 y.o. female who was seen by synchronous (real-time) audio-video technology on 8/31/2022 for Skin Problem and Letter for School/Work      Assessment & Plan:   Diagnoses and all orders for this visit:    1. Abscess of groin, left  -     trimethoprim-sulfamethoxazole (BACTRIM DS, SEPTRA DS) 160-800 mg per tablet; Take 1 Tablet by mouth two (2) times a day for 10 days. Indications: an infection of the skin and the tissue below the skin       Follow-up and Dispositions    Return if symptoms worsen or fail to improve with PCP. 12  Subjective:     Skin Infection Review    Kathie Antony is an 33 y.o. female who presents for a skin infection located on the left  groin . Onset of symptoms was 3 days ago, with gradually worsening since that time. Symptoms included erythema located on groin and pain of moderate. Patient denies fever greater than 100 and chills. There is not a history trauma to the area, but the patient does shave with a razor in the area. Treatment to date has included warm compresses with minimal relief. Pt states her left groin is hard and paiful to the touch. Pt is also an insulin dependent type 1 diabetic. Prior to Admission medications    Medication Sig Start Date End Date Taking? Authorizing Provider   trimethoprim-sulfamethoxazole (BACTRIM DS, SEPTRA DS) 160-800 mg per tablet Take 1 Tablet by mouth two (2) times a day for 10 days. Indications: an infection of the skin and the tissue below the skin 8/31/22 9/10/22 Yes Fredy Perea NP   dextroamphetamine-amphetamine (ADDERALL) 20 mg tablet Take 1 Tablet by mouth two (2) times a day. Max Daily Amount: 40 mg. 7/20/22  Yes Koko Duque NP   dextroamphetamine-amphetamine (ADDERALL) 5 mg tablet Take 1 Tablet by mouth two (2) times a day. Max Daily Amount: 10 mg. 7/20/22  Yes Koko Duque NP   insulin glargine (LANTUS) 100 unit/mL injection 50 Units by SubCUTAneous route nightly.  7/13/21  Yes Provider, Historical   omeprazole (PRILOSEC) 20 mg capsule Take 1 Capsule by mouth Daily (before breakfast). 5/10/22  Yes Minoo MENDOZA NP   LANTUS SOLOSTAR U-100 INSULIN 100 unit/mL (3 mL) inpn INJECT 30 UNITS DAILY IN CASE OF PUMP FAILURE 9/19/19  Yes Provider, Historical   ibuprofen (MOTRIN) 800 mg tablet Take 1 Tab by mouth every eight (8) hours as needed for Pain (take with food or milk). 10/16/19  Yes Maura Ellis NP   HumaLOG KwikPen Insulin 100 unit/mL kwikpen INJECT AS INSTRUCTED (MAX OF 50 UNITS PER DAY)  Patient not taking: No sig reported 2/5/22   Provider, Historical   insulin lispro (HUMALOG) 100 unit/mL injection Inject as instructed (max of 50 units per day)  Indications: huamlog in pump 7/13/21 8/31/22  Provider, Historical   ketoconazole (NIZORAL) 2 % topical cream Apply  to affected area daily. Patient not taking: No sig reported 1/6/21   Devorah Topete NP   albuterol (PROVENTIL HFA, VENTOLIN HFA, PROAIR HFA) 90 mcg/actuation inhaler Take 2 Puffs by inhalation every four (4) hours as needed for Wheezing, Shortness of Breath or Cough. Patient not taking: Reported on 8/31/2022 1/6/21   Devorah Topete NP    insulin pump (PATIENT SUPPLIED) misc by SubCUTAneous route as needed. Patient not taking: No sig reported    Provider, Historical   insulin lispro (HUMALOG) 100 unit/mL injection by SubCUTAneous route. 8/31/22  Other, MD Patricia       Objective:   No flowsheet data found. General: alert, cooperative, no distress   Mental  status: normal mood, behavior, speech, dress, motor activity, and thought processes, able to follow commands   HENT: NCAT   Neck: no visualized mass   Resp: no respiratory distress   Neuro: no gross deficits   Skin: no discoloration or lesions of concern on visible areas   Psychiatric: normal affect, consistent with stated mood, no evidence of hallucinations     Additional exam findings:        We discussed the expected course, resolution and complications of the diagnosis(es) in detail. Medication risks, benefits, costs, interactions, and alternatives were discussed as indicated. I advised her to contact the office if her condition worsens, changes or fails to improve as anticipated. She expressed understanding with the diagnosis(es) and plan. Linda Hammond, was evaluated through a synchronous (real-time) audio-video encounter. The patient (or guardian if applicable) is aware that this is a billable service, which includes applicable co-pays. Verbal consent to proceed has been obtained. The visit was conducted pursuant to the emergency declaration under the [address] [place] [address] and the [employer] and [employer] Act. Patient identification was verified, and a caregiver was present when appropriate. The patient was located at home in a state where the provider was licensed to provide care. Linda Hammond, was evaluated through a synchronous (real-time) audio-video encounter. The patient (or guardian if applicable) is aware that this is a billable service, which includes applicable co-pays. This Virtual Visit was conducted with patient's (and/or legal guardian's) consent. The visit was conducted pursuant to the emergency declaration under the [address] [place] [address] and the [employer] and [employer] Act. Patient identification was verified, and a caregiver was present when appropriate. The patient was located at: Home: [address] [place] [address]  The provider was located at: [facility] [id number]       Aspects of this note may have been generated using voice recognition software. Despite editing, there may be unrecognized errors.      Dominick De Luna NP  8/31/2022

## 2022-08-31 NOTE — PROGRESS NOTES
1. \"Have you been to the ER, urgent care clinic since your last visit? Hospitalized since your last visit? \" No    2. \"Have you seen or consulted any other health care providers outside of the [address] [place] [address] since your last visit? \" Yes Endocrinology      3. For patients aged 39-70: Has the patient had a colonoscopy / FIT/ Cologuard? NA - based on age      If the patient is female:    4. For patients aged 41-77: Has the patient had a mammogram within the past 2 years? NA - based on age or sex      11. For patients aged 21-65: Has the patient had a pap smear?  No

## 2022-08-31 NOTE — TELEPHONE ENCOUNTER
[facility] date for Adderall 5mg as 7/22/22 for a 30 d/s & Adderall 20mg as 7/21/22 for a 30 d/s. Last Visit: today with NP Erika Mon  Next Appointment: none  Previous Refill Encounter(s): 7/20/22 #60    Requested Prescriptions     Pending Prescriptions Disp Refills    dextroamphetamine-amphetamine (ADDERALL) 20 mg tablet 60 Tablet 0     Sig: Take 1 Tablet by mouth two (2) times a day. Max Daily Amount: 40 mg.    dextroamphetamine-amphetamine (ADDERALL) 5 mg tablet 60 Tablet 0     Sig: Take 1 Tablet by mouth two (2) times a day. Max Daily Amount: 10 mg. For [facility] in place:   Recommendation Provided To:    Intervention Detail: New Rx: 2, reason: Patient Preference  Gap Closed?:   Intervention Accepted By:   Time Spent (min): 5

## 2022-09-03 ENCOUNTER — APPOINTMENT (OUTPATIENT)
Dept: CT IMAGING | Age: 32
End: 2022-09-03
Attending: EMERGENCY MEDICINE
Payer: COMMERCIAL

## 2022-09-03 ENCOUNTER — HOSPITAL ENCOUNTER (EMERGENCY)
Age: 32
Discharge: HOME | End: 2022-09-03
Attending: EMERGENCY MEDICINE
Payer: COMMERCIAL

## 2022-09-03 ENCOUNTER — APPOINTMENT (OUTPATIENT)
Dept: GENERAL RADIOLOGY | Age: 32
End: 2022-09-03
Attending: EMERGENCY MEDICINE
Payer: COMMERCIAL

## 2022-09-03 VITALS
DIASTOLIC BLOOD PRESSURE: 56 MMHG | RESPIRATION RATE: 19 BRPM | HEART RATE: 95 BPM | WEIGHT: 135 LBS | OXYGEN SATURATION: 98 % | BODY MASS INDEX: 24.84 KG/M2 | HEIGHT: 62 IN | SYSTOLIC BLOOD PRESSURE: 97 MMHG | TEMPERATURE: 99 F

## 2022-09-03 DIAGNOSIS — L02.91 ABSCESS: Primary | ICD-10-CM

## 2022-09-03 DIAGNOSIS — R10.13 ABDOMINAL PAIN, EPIGASTRIC: ICD-10-CM

## 2022-09-03 DIAGNOSIS — L03.116 CELLULITIS OF LEFT LOWER EXTREMITY: ICD-10-CM

## 2022-09-03 LAB
ALBUMIN SERPL-MCNC: 3.6 G/DL (ref 3.4–5)
ALBUMIN/GLOB SERPL: 1 {RATIO} (ref 0.8–1.7)
ALP SERPL-CCNC: 52 U/L (ref 45–117)
ALT SERPL-CCNC: 14 U/L (ref 13–56)
ANION GAP SERPL CALC-SCNC: 11 MMOL/L (ref 3–18)
AST SERPL-CCNC: 10 U/L (ref 10–38)
ATRIAL RATE: 114 BPM
BILIRUB SERPL-MCNC: 0.4 MG/DL (ref 0.2–1)
BUN SERPL-MCNC: 7 MG/DL (ref 7–18)
BUN/CREAT SERPL: 15 (ref 12–20)
CALCIUM SERPL-MCNC: 8.8 MG/DL (ref 8.5–10.1)
CALCULATED P AXIS, ECG09: 76 DEGREES
CALCULATED R AXIS, ECG10: 75 DEGREES
CALCULATED T AXIS, ECG11: 32 DEGREES
CHLORIDE SERPL-SCNC: 103 MMOL/L (ref 100–111)
CO2 SERPL-SCNC: 21 MMOL/L (ref 21–32)
CREAT SERPL-MCNC: 0.47 MG/DL (ref 0.6–1.3)
DIAGNOSIS, 93000: NORMAL
GLOBULIN SER CALC-MCNC: 3.5 G/DL (ref 2–4)
GLUCOSE SERPL-MCNC: 105 MG/DL (ref 74–99)
HCG SERPL QL: NEGATIVE
LACTATE BLD-SCNC: 0.83 MMOL/L (ref 0.4–2)
LIPASE SERPL-CCNC: 46 U/L (ref 73–393)
P-R INTERVAL, ECG05: 122 MS
POTASSIUM SERPL-SCNC: 3.7 MMOL/L (ref 3.5–5.5)
PROT SERPL-MCNC: 7.1 G/DL (ref 6.4–8.2)
Q-T INTERVAL, ECG07: 318 MS
QRS DURATION, ECG06: 70 MS
QTC CALCULATION (BEZET), ECG08: 438 MS
SODIUM SERPL-SCNC: 135 MMOL/L (ref 136–145)
TROPONIN-HIGH SENSITIVITY: <3 NG/L (ref 0–54)
VENTRICULAR RATE, ECG03: 114 BPM

## 2022-09-03 PROCEDURE — 74177 CT ABD & PELVIS W/CONTRAST: CPT

## 2022-09-03 PROCEDURE — 74011000250 HC RX REV CODE- 250: Performed by: EMERGENCY MEDICINE

## 2022-09-03 PROCEDURE — 80053 COMPREHEN METABOLIC PANEL: CPT

## 2022-09-03 PROCEDURE — 83605 ASSAY OF LACTIC ACID: CPT

## 2022-09-03 PROCEDURE — 74011250637 HC RX REV CODE- 250/637: Performed by: PHYSICIAN ASSISTANT

## 2022-09-03 PROCEDURE — 74011250636 HC RX REV CODE- 250/636: Performed by: PHYSICIAN ASSISTANT

## 2022-09-03 PROCEDURE — 84703 CHORIONIC GONADOTROPIN ASSAY: CPT

## 2022-09-03 PROCEDURE — 74011000250 HC RX REV CODE- 250: Performed by: PHYSICIAN ASSISTANT

## 2022-09-03 PROCEDURE — 75810000289 HC I&D ABSCESS SIMP/COMP/MULT

## 2022-09-03 PROCEDURE — 84484 ASSAY OF TROPONIN QUANT: CPT

## 2022-09-03 PROCEDURE — 93005 ELECTROCARDIOGRAM TRACING: CPT

## 2022-09-03 PROCEDURE — 87040 BLOOD CULTURE FOR BACTERIA: CPT

## 2022-09-03 PROCEDURE — 83690 ASSAY OF LIPASE: CPT

## 2022-09-03 PROCEDURE — 71045 X-RAY EXAM CHEST 1 VIEW: CPT

## 2022-09-03 PROCEDURE — 74011250636 HC RX REV CODE- 250/636: Performed by: EMERGENCY MEDICINE

## 2022-09-03 PROCEDURE — 96375 TX/PRO/DX INJ NEW DRUG ADDON: CPT

## 2022-09-03 PROCEDURE — 99285 EMERGENCY DEPT VISIT HI MDM: CPT

## 2022-09-03 PROCEDURE — 96374 THER/PROPH/DIAG INJ IV PUSH: CPT

## 2022-09-03 PROCEDURE — 96365 THER/PROPH/DIAG IV INF INIT: CPT

## 2022-09-03 PROCEDURE — 74011000636 HC RX REV CODE- 636: Performed by: EMERGENCY MEDICINE

## 2022-09-03 RX ORDER — KETOROLAC TROMETHAMINE 15 MG/ML
15 INJECTION, SOLUTION INTRAMUSCULAR; INTRAVENOUS
Status: COMPLETED | OUTPATIENT
Start: 2022-09-03 | End: 2022-09-03

## 2022-09-03 RX ORDER — CEFEPIME HYDROCHLORIDE 2 G/1
2 INJECTION, POWDER, FOR SOLUTION INTRAVENOUS EVERY 12 HOURS
Status: DISCONTINUED | OUTPATIENT
Start: 2022-09-03 | End: 2022-09-03

## 2022-09-03 RX ORDER — LIDOCAINE HYDROCHLORIDE 20 MG/ML
15 SOLUTION OROPHARYNGEAL
Status: DISCONTINUED | OUTPATIENT
Start: 2022-09-03 | End: 2022-09-03 | Stop reason: HOSPADM

## 2022-09-03 RX ORDER — DOXYCYCLINE HYCLATE 100 MG
100 TABLET ORAL 2 TIMES DAILY
Qty: 14 TABLET | Refills: 0 | Status: SHIPPED | OUTPATIENT
Start: 2022-09-03 | End: 2022-09-10

## 2022-09-03 RX ORDER — HYDROCODONE BITARTRATE AND ACETAMINOPHEN 5; 325 MG/1; MG/1
1 TABLET ORAL
Qty: 5 TABLET | Refills: 0 | Status: SHIPPED | OUTPATIENT
Start: 2022-09-03 | End: 2022-09-06

## 2022-09-03 RX ORDER — HYDROCODONE BITARTRATE AND ACETAMINOPHEN 5; 325 MG/1; MG/1
1 TABLET ORAL
Qty: 5 TABLET | Refills: 0 | Status: SHIPPED | OUTPATIENT
Start: 2022-09-03 | End: 2022-09-03

## 2022-09-03 RX ORDER — DOXYCYCLINE HYCLATE 100 MG
100 TABLET ORAL 2 TIMES DAILY
Qty: 14 TABLET | Refills: 0 | Status: SHIPPED | OUTPATIENT
Start: 2022-09-03 | End: 2022-09-03

## 2022-09-03 RX ORDER — SODIUM CHLORIDE 0.9 % (FLUSH) 0.9 %
5-10 SYRINGE (ML) INJECTION AS NEEDED
Status: DISCONTINUED | OUTPATIENT
Start: 2022-09-03 | End: 2022-09-03 | Stop reason: HOSPADM

## 2022-09-03 RX ORDER — AMOXICILLIN AND CLAVULANATE POTASSIUM 875; 125 MG/1; MG/1
1 TABLET, FILM COATED ORAL 2 TIMES DAILY
Qty: 14 TABLET | Refills: 0 | Status: SHIPPED | OUTPATIENT
Start: 2022-09-03 | End: 2022-09-10

## 2022-09-03 RX ORDER — AMOXICILLIN AND CLAVULANATE POTASSIUM 875; 125 MG/1; MG/1
1 TABLET, FILM COATED ORAL 2 TIMES DAILY
Qty: 14 TABLET | Refills: 0 | Status: SHIPPED | OUTPATIENT
Start: 2022-09-03 | End: 2022-09-03

## 2022-09-03 RX ORDER — MORPHINE SULFATE 4 MG/ML
4 INJECTION INTRAVENOUS
Status: COMPLETED | OUTPATIENT
Start: 2022-09-03 | End: 2022-09-03

## 2022-09-03 RX ORDER — VANCOMYCIN/0.9 % SOD CHLORIDE 1.5G/250ML
1500 PLASTIC BAG, INJECTION (ML) INTRAVENOUS ONCE
Status: COMPLETED | OUTPATIENT
Start: 2022-09-03 | End: 2022-09-03

## 2022-09-03 RX ORDER — DIPHENHYDRAMINE HYDROCHLORIDE 50 MG/ML
25 INJECTION, SOLUTION INTRAMUSCULAR; INTRAVENOUS ONCE
Status: COMPLETED | OUTPATIENT
Start: 2022-09-03 | End: 2022-09-03

## 2022-09-03 RX ORDER — ONDANSETRON 2 MG/ML
4 INJECTION INTRAMUSCULAR; INTRAVENOUS
Status: COMPLETED | OUTPATIENT
Start: 2022-09-03 | End: 2022-09-03

## 2022-09-03 RX ADMIN — ALUMINUM HYDROXIDE AND MAGNESIUM HYDROXIDE 15 ML: 200; 200 SUSPENSION ORAL at 09:41

## 2022-09-03 RX ADMIN — KETOROLAC TROMETHAMINE 15 MG: 15 INJECTION, SOLUTION INTRAMUSCULAR; INTRAVENOUS at 13:34

## 2022-09-03 RX ADMIN — LIDOCAINE HYDROCHLORIDE 45 MG: 10; .005 INJECTION, SOLUTION EPIDURAL; INFILTRATION; INTRACAUDAL; PERINEURAL at 09:23

## 2022-09-03 RX ADMIN — IOPAMIDOL 96 ML: 612 INJECTION, SOLUTION INTRAVENOUS at 10:54

## 2022-09-03 RX ADMIN — SODIUM CHLORIDE 1836 ML: 9 INJECTION, SOLUTION INTRAVENOUS at 09:23

## 2022-09-03 RX ADMIN — VANCOMYCIN HYDROCHLORIDE 1500 MG: 10 INJECTION, POWDER, LYOPHILIZED, FOR SOLUTION INTRAVENOUS at 10:19

## 2022-09-03 RX ADMIN — CEFEPIME 2 G: 2 INJECTION, POWDER, FOR SOLUTION INTRAVENOUS at 08:07

## 2022-09-03 RX ADMIN — DIPHENHYDRAMINE HYDROCHLORIDE 25 MG: 50 INJECTION, SOLUTION INTRAMUSCULAR; INTRAVENOUS at 12:05

## 2022-09-03 RX ADMIN — MORPHINE SULFATE 4 MG: 4 INJECTION INTRAVENOUS at 09:41

## 2022-09-03 RX ADMIN — ONDANSETRON 4 MG: 2 INJECTION INTRAMUSCULAR; INTRAVENOUS at 08:14

## 2022-09-03 NOTE — DISCHARGE INSTRUCTIONS
Use warm compresses for your abscess and monitor your wound to see if the redness is expanding beyond the markings we put there. Take your pain medication and start the new antibiotics and stop the Bactrim. Make sure to follow-up closely with your primary doctor for wound recheck and return if you are at all worsened or concerned.

## 2022-09-03 NOTE — PROGRESS NOTES
[address] [facility] Pharmacokinetic Monitoring Service - Vancomycin     Lori Villalta is a 33 y.o. female starting on vancomycin therapy for sepsis of unknown etiology. Pharmacy consulted by Georgette Stewart MD for monitoring and adjustment. Target Concentration: Goal AUC/ILANA 400-600 mg*hr/L    Additional Antimicrobials: cefepime    Pertinent Laboratory Values: Wt Readings from Last 1 Encounters:   09/03/22 61.2 kg (135 lb)     Temp Readings from Last 1 Encounters:   09/03/22 99 °F (37.2 °C)     No components found for: PROCAL  Estimated Creatinine Clearance: 100.2 mL/min (A) (by C-G formula based on SCr of 0.47 mg/dL (L)). No results for input(s): WBC in the last 72 hours. No lab exists for component: CREATININE,  BUN    Pertinent Cultures:  Culture Date Source Results   9/3 Blood In process   MRSA Nasal Swab: N/A. Non-respiratory infection. .    Plan:  Dosing recommendations based on Bayesian software  Start vancomycin 1500 mg loading dose  NO Vancomycin concentration ordered at this time  Pharmacy will continue to monitor patient and adjust therapy as indicated    Thank you for the consult,  MANI Sanchez  9/3/2022 10:00 AM

## 2022-09-03 NOTE — PROGRESS NOTES
Pharmacy Note     Cefepime 2gm q12h ordered for treatment of sepsis. Per [address], Cefepime will be changed to 2 gm IV push now followed by 2 gm q8h extended infusion. Will adjust for renal function if needed when labs available    Estimated Creatinine Clearance: CrCl cannot be calculated (Patient's most recent lab result is older than the maximum 180 days allowed. ). Dialysis Status, ANDERS, CKD: N/A    BMI:  Body mass index is 24.69 kg/m². Rationale for Adjustment:  [facility] B-Lactam extended infusion policy    Pharmacy will continue to monitor and adjust dose as necessary. Please call with any questions.     Thank you,  MANI Blair

## 2022-09-03 NOTE — PROGRESS NOTES
[address] [facility] Pharmacokinetic Monitoring Service - Vancomycin    Consulting Provider: Dr. Dominic Garg   Indication: Sepsis of Unknown Etiology  Target Concentration: Goal AUC/ILANA 400-600 mg*hr/L  Day of Therapy: 1  Additional Antimicrobials: Cefepime    Pertinent Laboratory Values:   Temp: 99 °F (37.2 °C)  Weight: 61.2 kg (135 lb)  Recent Labs     09/03/22  0750   CREA 0.47*   BUN 7       Estimated Creatinine Clearance: 100.2 mL/min (A) (by C-G formula based on SCr of 0.47 mg/dL (L)). Pertinent Cultures:  Culture Date Source Results   9/3 blood pending   MRSA Nasal Swab: N/A.  Non-respiratory infection    Assessment:  Date/Time Current Dose Concentration Timing of Concentration (h) AUC   9/3 1500mg x1, then 1gm q12h - - -   Note: Serum concentrations collected for AUC dosing may appear elevated if collected in close proximity to the dose administered, this is not necessarily an indication of toxicity    Plan:  Current dosing regimen is therapeutic  Continue current dose of 1gm q12h, projected AUCss/T = 414/11.3  Renal labs as indicated   Vancomycin concentration ordered for AM labs tomorrow  Pharmacy will continue to monitor patient and adjust therapy as indicated    Thank you for the consult,  Arlette [facility]  9/3/2022

## 2022-09-03 NOTE — ED TRIAGE NOTES
34 yo F to ED for epigastric pain and abscess. Reports epigastric pain has been for about a month, has been taking prilosec with no relief, says pain is worse at night-- describes as burning sharp pain that radiates to back. Pt also has abscess in the L groin, on day 2 of bactrim and reports it is getting worse.  PMH: DMT1

## 2022-09-03 NOTE — ED PROVIDER NOTES
EMERGENCY DEPARTMENT HISTORY AND PHYSICAL EXAM    8:19 AM      Date: 9/3/2022  Patient Name: Nash Zepeda    History of Presenting Illness     Chief Complaint   Patient presents with    Chest Pain    Abscess         History Provided By: Patient  Location/Duration/Severity/Modifying factors   The patient is a 81-year-old female with history of insulin-dependent diabetes off her insulin pump due to a recall, ADHD, endometriosis, the presents emergency department complaint of epigastric pain this been present for little over a month and concern from her primary provider as she may have biliary disease. Patient noted this morning the pain was radiating to her chest and wanted to be evaluated. In addition the patient notes that she had some left inguinal pain this been progressive for the past week and she saw her primary provider as well was put on Bactrim is been taking over the past 2 days with only worsening of the pain and redness. The patient said that the chest pain, worsening epigastric pain, and this redness spreading on her left leg is concerned to the point that she wanted to be evaluated. Patient denies any fevers or chills. Patient is having some nausea and has had bilious emesis as well. Patient does not suspect that she is pregnant however would like a test.  The patient denies vaginal discharge or new sexual contacts. Patient is being a light smoker, occasionally drinks alcohol, denies any drug use.       PCP: Agnes Zapata NP    Current Facility-Administered Medications   Medication Dose Route Frequency Provider Last Rate Last Admin    sodium chloride (NS) flush 5-10 mL  5-10 mL IntraVENous PRN Yogesh Villar MD        cefepime (MAXIPIME) 2 g in 0.9% sodium chloride (MBP/ADV) 100 mL MBP  2 g IntraVENous Q8H Carroll Andersen MD        lidocaine (XYLOCAINE) 2 % viscous solution 15 mL  15 mL Mouth/Throat Q3H PRN Yolanda Lopez PA        vancomycin (VANCOCIN) 1,000 mg in 0.9% sodium chloride 250 mL (VIAL-MATE)  1,000 mg IntraVENous Q12H Frank Olmedo MD         Current Outpatient Medications   Medication Sig Dispense Refill    amoxicillin-clavulanate (Augmentin) 875-125 mg per tablet Take 1 Tablet by mouth two (2) times a day for 7 days. 14 Tablet 0    doxycycline (VIBRA-TABS) 100 mg tablet Take 1 Tablet by mouth two (2) times a day for 7 days. 14 Tablet 0    HYDROcodone-acetaminophen (NORCO) 5-325 mg per tablet Take 1 Tablet by mouth every six (6) hours as needed for Pain for up to 3 days. Max Daily Amount: 4 Tablets. Take 1-2 tablets PO every 4-6 hours as needed for pain control. If over the counter ibuprofen or acetaminophen was suggested, then only take the vicodin for pain not well controlled with the over the counter medication. 5 Tablet 0    dextroamphetamine-amphetamine (ADDERALL) 20 mg tablet Take 1 Tablet by mouth two (2) times a day. Max Daily Amount: 40 mg. 60 Tablet 0    dextroamphetamine-amphetamine (ADDERALL) 5 mg tablet Take 1 Tablet by mouth two (2) times a day. Max Daily Amount: 10 mg. 60 Tablet 0    insulin glargine (LANTUS) 100 unit/mL injection 50 Units by SubCUTAneous route nightly. HumaLOG KwikPen Insulin 100 unit/mL kwikpen INJECT AS INSTRUCTED (MAX OF 50 UNITS PER DAY) (Patient not taking: No sig reported)      omeprazole (PRILOSEC) 20 mg capsule Take 1 Capsule by mouth Daily (before breakfast). 90 Capsule 0    ketoconazole (NIZORAL) 2 % topical cream Apply  to affected area daily. (Patient not taking: No sig reported) 15 g 1    albuterol (PROVENTIL HFA, VENTOLIN HFA, PROAIR HFA) 90 mcg/actuation inhaler Take 2 Puffs by inhalation every four (4) hours as needed for Wheezing, Shortness of Breath or Cough.  (Patient not taking: Reported on 8/31/2022) 1 Inhaler 0    LANTUS SOLOSTAR U-100 INSULIN 100 unit/mL (3 mL) inpn INJECT 30 UNITS DAILY IN CASE OF PUMP FAILURE  3    ibuprofen (MOTRIN) 800 mg tablet Take 1 Tab by mouth every eight (8) hours as needed for Pain (take with food or milk). 90 Tab 1     insulin pump (PATIENT SUPPLIED) misc by SubCUTAneous route as needed. (Patient not taking: No sig reported)         Past History     Past Medical History:  Past Medical History:   Diagnosis Date    ADHD (attention deficit hyperactivity disorder)     Diabetes mellitus type 1 ([facility] [address]     Endometriosis        Past Surgical History:  Past Surgical History:   Procedure Laterality Date    HX BACK SURGERY  5/23/2014    lower back     HX ORTHOPAEDIC  5/23/2014    right medial ankle     HX OTHER SURGICAL Left     pt had a gail put into left leg       Family History:  Family History   Problem Relation Age of Onset    Arthritis-rheumatoid Paternal Grandmother     Hypertension Mother     Diabetes Maternal Grandmother         type 2    Heart Disease Neg Hx        Social History:  Social History     Tobacco Use    Smoking status: Light Smoker    Smokeless tobacco: Never   Vaping Use    Vaping Use: Never used   Substance Use Topics    Alcohol use: Yes     Alcohol/week: 4.0 standard drinks     Types: 4 Standard drinks or equivalent per week     Comment: occasionally     Drug use: No       Allergies:  No Known Allergies      Review of Systems       Review of Systems   Constitutional:  Negative for activity change, fatigue and fever. HENT:  Negative for congestion and rhinorrhea. Eyes:  Negative for visual disturbance. Respiratory:  Negative for shortness of breath. Cardiovascular:  Positive for chest pain. Negative for palpitations. Gastrointestinal:  Positive for abdominal pain, nausea and vomiting. Negative for diarrhea. Genitourinary:  Negative for dysuria and hematuria. Musculoskeletal:  Negative for back pain. Skin:  Positive for wound. Negative for rash. Neurological:  Negative for dizziness, weakness and light-headedness. All other systems reviewed and are negative.       Physical Exam   Visit Vitals  BP (!) 97/56   Pulse 95   Temp 99 °F (37.2 °C)   Resp 19 Ht 5' 2\" (1.575 m)   Wt 61.2 kg (135 lb)   SpO2 98%   BMI 24.69 kg/m²         Physical Exam  Vitals and nursing note reviewed. Constitutional:       General: She is not in acute distress. Appearance: She is well-developed. HENT:      Head: Normocephalic and atraumatic. Right Ear: External ear normal.      Left Ear: External ear normal.      Nose: Nose normal.   Eyes:      General: No scleral icterus. Conjunctiva/sclera: Conjunctivae normal.      Pupils: Pupils are equal, round, and reactive to light. Neck:      Thyroid: No thyromegaly. Vascular: No JVD. Trachea: No tracheal deviation. Cardiovascular:      Rate and Rhythm: Regular rhythm. Tachycardia present. Heart sounds: Normal heart sounds. No murmur heard. No friction rub. No gallop. Pulmonary:      Effort: Pulmonary effort is normal.      Breath sounds: Normal breath sounds. Chest:      Chest wall: No tenderness. Abdominal:      General: Bowel sounds are normal. There is no distension. Palpations: Abdomen is soft. Tenderness: There is abdominal tenderness. There is no guarding or rebound. Comments: Epigastric pain without guarding   Genitourinary:     Comments: Left inguinal crease with erythema, induration, and pain  Musculoskeletal:         General: No tenderness. Normal range of motion. Cervical back: Normal range of motion and neck supple. Lymphadenopathy:      Cervical: No cervical adenopathy. Skin:     General: Skin is warm and dry. Comments: As above   Neurological:      Mental Status: She is alert and oriented to person, place, and time. Cranial Nerves: No cranial nerve deficit. Coordination: Coordination normal.      Comments: No sensory loss, Gait normal, Motor 5/5   Psychiatric:         Behavior: Behavior normal.         Thought Content:  Thought content normal.         Judgment: Judgment normal.         Diagnostic Study Results     Labs -  Recent Results (from the past 12 hour(s))   EKG, 12 LEAD, INITIAL    Collection Time: 09/03/22  7:32 AM   Result Value Ref Range    Ventricular Rate 114 BPM    Atrial Rate 114 BPM    P-R Interval 122 ms    QRS Duration 70 ms    Q-T Interval 318 ms    QTC Calculation (Bezet) 438 ms    Calculated P Axis 76 degrees    Calculated R Axis 75 degrees    Calculated T Axis 32 degrees    Diagnosis       Sinus tachycardia  Otherwise normal ECG  When compared with ECG of 09-FEB-2017 19:50,  No significant change was found  Confirmed by Jana Garcia MD, Heraclio Morrissey ([id number]) on 9/3/2022 11:39:27 AM     POC LACTIC ACID    Collection Time: 09/03/22  7:46 AM   Result Value Ref Range    Lactic Acid (POC) 0.83 0.40 - 3.76 mmol/L   METABOLIC PANEL, COMPREHENSIVE    Collection Time: 09/03/22  7:50 AM   Result Value Ref Range    Sodium 135 (L) 136 - 145 mmol/L    Potassium 3.7 3.5 - 5.5 mmol/L    Chloride 103 100 - 111 mmol/L    CO2 21 21 - 32 mmol/L    Anion gap 11 3.0 - 18 mmol/L    Glucose 105 (H) 74 - 99 mg/dL    BUN 7 7.0 - 18 MG/DL    Creatinine 0.47 (L) 0.6 - 1.3 MG/DL    BUN/Creatinine ratio 15 12 - 20      GFR est AA >60 >60 ml/min/1.73m2    GFR est non-AA >60 >60 ml/min/1.73m2    Calcium 8.8 8.5 - 10.1 MG/DL    Bilirubin, total 0.4 0.2 - 1.0 MG/DL    ALT (SGPT) 14 13 - 56 U/L    AST (SGOT) 10 10 - 38 U/L    Alk. phosphatase 52 45 - 117 U/L    Protein, total 7.1 6.4 - 8.2 g/dL    Albumin 3.6 3.4 - 5.0 g/dL    Globulin 3.5 2.0 - 4.0 g/dL    A-G Ratio 1.0 0.8 - 1.7     TROPONIN-HIGH SENSITIVITY    Collection Time: 09/03/22  7:50 AM   Result Value Ref Range    Troponin-High Sensitivity <3 0 - 54 ng/L   HCG QL SERUM    Collection Time: 09/03/22  7:50 AM   Result Value Ref Range    HCG, Ql. Negative NEG     LIPASE    Collection Time: 09/03/22  7:50 AM   Result Value Ref Range    Lipase 46 (L) 73 - 393 U/L       Radiologic Studies -   CT ABD PELV W CONT   Final Result   1.  There is skin thickening, focal inflammation, and phlegmonous changes in the   soft tissues of the left groin, consistent with cellulitis. No residual   drainable fluid collections. 2. Mildly enlarged left inguinal lymph node, likely benign/reactive due to #1.      3. There is regional hypoattenuation throughout the cervix, which is likely   representative of differential enhancement between the cervix and the uterus   (variant appearance). However out of precaution, recommend pelvic ultrasound for   further evaluation if gynecologic examination not recently performed. XR CHEST PORT   Final Result   1. No radiographic evidence of acute cardiopulmonary disease. Thank you for enabling us to participate in the care of this patient. Medical Decision Making   I am the first provider for this patient. I reviewed the vital signs, available nursing notes, past medical history, past surgical history, family history and social history. Vital Signs-Reviewed the patient's vital signs. EKG: Sinus tachycardia at 114, no STEMI, interpreted by me    Records Reviewed: Nursing Notes, Old Medical Records, Previous Radiology Studies, and Previous Laboratory Studies (Time of Review: 8:19 AM)    ED Course: Progress Notes, Reevaluation, and Consults: The patient CT scan suggests infection in the left groin which is known clinically. The patient has been given vancomycin for her purulent infection and developed a red face so was stopped. We will give a dose of Benadryl, control her pain, reevaluate. Radha Hunter DO 12:21 PM    Patient's heart rate is under 100 and will reevaluate. Radha Hunter, DO 2:48 PM    Patient is feeling better and will start the patient on Augmentin and doxycycline and hold the Bactrim. Nurses marked her wound and patient was educated that signs of spreading beyond the marking is decided infection is worsening. The patient has an appointment with a gastroenterologist and her CT scan did not show any evidence of obvious biliary disease.   We will start the patient on some pain control, have her rest, keep her sugars under control, and return if at all worsened or concerned. Workup and recommendations were reviewed with the patient and all questions were answered. The patient understands the plan and will proceed with close outpatient care. I have encouraged the patient to return if at all worsened or concerned. Sandra Nielson DO 2:59 PM          Provider Notes (Medical Decision Making):   MDM  Number of Diagnoses or Management Options  Abdominal pain, epigastric  Abscess  Cellulitis of left lower extremity  Diagnosis management comments: The patient is a 41-year-old female with a history of insulin-dependent diabetes, endometriosis, ADHD, the presents emergency department with complaint of 1 month of epigastric pain with nausea and vomiting but recently placed on Bactrim for a left inguinal infection. The patient appears to have an abscess in her left groin however may be related to lymphadenopathy however she denies any vaginal discharge or any concern for STI. We will start the patient empiric antibiotics, IV fluids, send cardiac labs, lipase, LFTs, supportive care, and then reevaluate. Sandra Nielson DO 8:31 AM          I&D Abcess Complex    Date/Time: 9/5/2022 9:48 PM  Performed by: ALEX Hua  Authorized by:  Camila Vasquez MD     Consent:     Consent obtained:  Verbal    Consent given by:  Patient    Risks, benefits, and alternatives were discussed: yes      Risks discussed:  Bleeding, incomplete drainage and pain    Alternatives discussed:  No treatment  Universal protocol:     Procedure explained and questions answered to patient or proxy's satisfaction: yes      Relevant documents present and verified: yes      Test results available : yes      Patient identity confirmed:  Verbally with patient  Location:     Type:  Abscess    Size:  4    Location:  Lower extremity    Lower extremity location:  Leg    Leg location:  L upper leg  Pre-procedure details:     Skin preparation:  Antiseptic wash  Sedation:     Sedation type:  None  Anesthesia:     Anesthesia method:  Local infiltration  Procedure type:     Complexity:  Simple  Procedure details:     Ultrasound guidance: yes      Incision types:  Stab incision    Incision depth:  Dermal    Wound management:  Probed and deloculated    Drainage:  Purulent    Drainage amount: Moderate    Wound treatment:  Wound left open  Post-procedure details:     Procedure completion:  Tolerated well, no immediate complications      Diagnosis     Clinical Impression:   1. Abscess    2. Cellulitis of left lower extremity    3. Abdominal pain, epigastric        Disposition: DC    Follow-up Information    None          Patient's Medications   Start Taking    AMOXICILLIN-CLAVULANATE (AUGMENTIN) 875-125 MG PER TABLET    Take 1 Tablet by mouth two (2) times a day for 7 days. DOXYCYCLINE (VIBRA-TABS) 100 MG TABLET    Take 1 Tablet by mouth two (2) times a day for 7 days. HYDROCODONE-ACETAMINOPHEN (NORCO) 5-325 MG PER TABLET    Take 1 Tablet by mouth every six (6) hours as needed for Pain for up to 3 days. Max Daily Amount: 4 Tablets. Take 1-2 tablets PO every 4-6 hours as needed for pain control. If over the counter ibuprofen or acetaminophen was suggested, then only take the vicodin for pain not well controlled with the over the counter medication. Continue Taking     INSULIN PUMP (PATIENT SUPPLIED) MISC    by SubCUTAneous route as needed. ALBUTEROL (PROVENTIL HFA, VENTOLIN HFA, PROAIR HFA) 90 MCG/ACTUATION INHALER    Take 2 Puffs by inhalation every four (4) hours as needed for Wheezing, Shortness of Breath or Cough. DEXTROAMPHETAMINE-AMPHETAMINE (ADDERALL) 20 MG TABLET    Take 1 Tablet by mouth two (2) times a day. Max Daily Amount: 40 mg. DEXTROAMPHETAMINE-AMPHETAMINE (ADDERALL) 5 MG TABLET    Take 1 Tablet by mouth two (2) times a day.  Max Daily Amount: 10 mg.    HUMALOG KWIKPEN INSULIN 100 UNIT/ML KWIKPEN    INJECT AS INSTRUCTED (MAX OF 50 UNITS PER DAY)    IBUPROFEN (MOTRIN) 800 MG TABLET    Take 1 Tab by mouth every eight (8) hours as needed for Pain (take with food or milk). INSULIN GLARGINE (LANTUS) 100 UNIT/ML INJECTION    50 Units by SubCUTAneous route nightly. KETOCONAZOLE (NIZORAL) 2 % TOPICAL CREAM    Apply  to affected area daily. LANTUS SOLOSTAR U-100 INSULIN 100 UNIT/ML (3 ML) INPN    INJECT 30 UNITS DAILY IN CASE OF PUMP FAILURE    OMEPRAZOLE (PRILOSEC) 20 MG CAPSULE    Take 1 Capsule by mouth Daily (before breakfast). These Medications have changed    No medications on file   Stop Taking    TRIMETHOPRIM-SULFAMETHOXAZOLE (BACTRIM DS, SEPTRA DS) 160-800 MG PER TABLET    Take 1 Tablet by mouth two (2) times a day for 10 days. Indications: an infection of the skin and the tissue below the skin     Disclaimer: Sections of this note are dictated using utilizing voice recognition software. Minor typographical errors may be present. If questions arise, please do not hesitate to contact me or call our department.

## 2022-09-03 NOTE — Clinical Note
[address] [place] [address] [facility] EMERGENCY DEPT  [address] [address] [phone]    Work/School Note    Date: 9/3/2022    To Whom It May concern:    Juan Pablo Boone was seen and treated today in the emergency room by the following provider(s):  Attending Provider: Frederick Dickinson MD.      Juan Pablo Boone is excused from work/school on 09/03/22 and 09/04/22. She is medically clear to return to work/school on 9/5/2022.        Sincerely,          Mavis Hope MD

## 2022-09-05 RX ORDER — DEXTROAMPHETAMINE SACCHARATE, AMPHETAMINE ASPARTATE, DEXTROAMPHETAMINE SULFATE AND AMPHETAMINE SULFATE 5; 5; 5; 5 MG/1; MG/1; MG/1; MG/1
20 TABLET ORAL 2 TIMES DAILY
Qty: 60 TABLET | Refills: 0 | Status: SHIPPED | OUTPATIENT
Start: 2022-09-05 | End: 2022-10-10 | Stop reason: SDUPTHER

## 2022-09-05 RX ORDER — DEXTROAMPHETAMINE SACCHARATE, AMPHETAMINE ASPARTATE, DEXTROAMPHETAMINE SULFATE AND AMPHETAMINE SULFATE 1.25; 1.25; 1.25; 1.25 MG/1; MG/1; MG/1; MG/1
5 TABLET ORAL 2 TIMES DAILY
Qty: 60 TABLET | Refills: 0 | Status: SHIPPED | OUTPATIENT
Start: 2022-09-05 | End: 2022-10-10 | Stop reason: SDUPTHER

## 2022-09-09 LAB
BACTERIA SPEC CULT: NORMAL
BACTERIA SPEC CULT: NORMAL
SERVICE CMNT-IMP: NORMAL
SERVICE CMNT-IMP: NORMAL

## 2022-09-21 ENCOUNTER — HOSPITAL ENCOUNTER (INPATIENT)
Age: 32
LOS: 2 days | Discharge: HOME | DRG: 639 | End: 2022-09-23
Attending: EMERGENCY MEDICINE | Admitting: INTERNAL MEDICINE
Payer: COMMERCIAL

## 2022-09-21 ENCOUNTER — APPOINTMENT (OUTPATIENT)
Dept: GENERAL RADIOLOGY | Age: 32
DRG: 639 | End: 2022-09-21
Attending: PHYSICIAN ASSISTANT
Payer: COMMERCIAL

## 2022-09-21 DIAGNOSIS — R10.10 PAIN OF UPPER ABDOMEN: ICD-10-CM

## 2022-09-21 DIAGNOSIS — E10.10 TYPE 1 DIABETES MELLITUS WITH KETOACIDOSIS WITHOUT COMA (HCC): Primary | ICD-10-CM

## 2022-09-21 PROBLEM — E11.10 DKA (DIABETIC KETOACIDOSIS) (HCC): Status: ACTIVE | Noted: 2022-09-21

## 2022-09-21 LAB
ADMINISTERED INITIALS, ADMINIT: NORMAL
ALBUMIN SERPL-MCNC: 4.1 G/DL (ref 3.4–5)
ALBUMIN/GLOB SERPL: 1.1 {RATIO} (ref 0.8–1.7)
ALP SERPL-CCNC: 56 U/L (ref 45–117)
ALT SERPL-CCNC: 19 U/L (ref 13–56)
ANION GAP BLD CALC-SCNC: 16 MMOL/L (ref 10–20)
ANION GAP SERPL CALC-SCNC: 12 MMOL/L (ref 3–18)
ANION GAP SERPL CALC-SCNC: 18 MMOL/L (ref 3–18)
APPEARANCE UR: ABNORMAL
AST SERPL-CCNC: 6 U/L (ref 10–38)
BACTERIA URNS QL MICRO: ABNORMAL /HPF
BASE DEFICIT BLD-SCNC: 16.8 MMOL/L
BASOPHILS # BLD: 0.1 K/UL (ref 0–0.1)
BASOPHILS NFR BLD: 2 % (ref 0–2)
BILIRUB SERPL-MCNC: 0.6 MG/DL (ref 0.2–1)
BILIRUB UR QL: NEGATIVE
BNP SERPL-MCNC: 23 PG/ML (ref 0–450)
BUN SERPL-MCNC: 5 MG/DL (ref 7–18)
BUN SERPL-MCNC: 6 MG/DL (ref 7–18)
BUN/CREAT SERPL: 11 (ref 12–20)
BUN/CREAT SERPL: 9 (ref 12–20)
CA-I BLD-MCNC: 1.17 MMOL/L (ref 1.12–1.32)
CALCIUM SERPL-MCNC: 7.2 MG/DL (ref 8.5–10.1)
CALCIUM SERPL-MCNC: 9.1 MG/DL (ref 8.5–10.1)
CHLORIDE BLD-SCNC: 109 MMOL/L (ref 98–107)
CHLORIDE SERPL-SCNC: 105 MMOL/L (ref 100–111)
CHLORIDE SERPL-SCNC: 115 MMOL/L (ref 100–111)
CO2 BLD-SCNC: 11 MMOL/L (ref 19–24)
CO2 SERPL-SCNC: 11 MMOL/L (ref 21–32)
CO2 SERPL-SCNC: 13 MMOL/L (ref 21–32)
COLOR UR: YELLOW
CREAT BLD-MCNC: 0.57 MG/DL (ref 0.6–1.3)
CREAT SERPL-MCNC: 0.44 MG/DL (ref 0.6–1.3)
CREAT SERPL-MCNC: 0.67 MG/DL (ref 0.6–1.3)
D50 ADMINISTERED, D50ADM: 0 ML
D50 ORDER, D50ORD: 0 ML
DIFFERENTIAL METHOD BLD: ABNORMAL
EOSINOPHIL # BLD: 0 K/UL (ref 0–0.4)
EOSINOPHIL NFR BLD: 1 % (ref 0–5)
EPITH CASTS URNS QL MICRO: ABNORMAL /LPF (ref 0–5)
ERYTHROCYTE [DISTWIDTH] IN BLOOD BY AUTOMATED COUNT: 12.8 % (ref 11.6–14.5)
EST. AVERAGE GLUCOSE BLD GHB EST-MCNC: 237 MG/DL
FLUAV RNA SPEC QL NAA+PROBE: NOT DETECTED
FLUBV RNA SPEC QL NAA+PROBE: NOT DETECTED
GLOBULIN SER CALC-MCNC: 3.6 G/DL (ref 2–4)
GLUCOSE BLD STRIP.AUTO-MCNC: 131 MG/DL (ref 70–110)
GLUCOSE BLD STRIP.AUTO-MCNC: 174 MG/DL (ref 70–110)
GLUCOSE BLD STRIP.AUTO-MCNC: 225 MG/DL (ref 70–110)
GLUCOSE BLD-MCNC: 325 MG/DL (ref 65–100)
GLUCOSE SERPL-MCNC: 145 MG/DL (ref 74–99)
GLUCOSE SERPL-MCNC: 312 MG/DL (ref 74–99)
GLUCOSE UR STRIP.AUTO-MCNC: >1000 MG/DL
GLUCOSE, GLC: 131 MG/DL
GLUCOSE, GLC: 174 MG/DL
GLUCOSE, GLC: 225 MG/DL
GLUCOSE, GLC: 312 MG/DL
HBA1C MFR BLD: 9.9 % (ref 4.2–5.6)
HCG SERPL QL: NEGATIVE
HCO3 BLD-SCNC: 10.2 MMOL/L (ref 22–26)
HCT VFR BLD AUTO: 46.7 % (ref 35–45)
HGB BLD-MCNC: 15.4 G/DL (ref 12–16)
HGB UR QL STRIP: NEGATIVE
HIGH TARGET, HITG: 200 MG/DL
IMM GRANULOCYTES # BLD AUTO: 0.3 K/UL (ref 0–0.04)
IMM GRANULOCYTES NFR BLD AUTO: 4 % (ref 0–0.5)
INSULIN ADMINSTERED, INSADM: 1.4 UNITS/HOUR
INSULIN ADMINSTERED, INSADM: 2.3 UNITS/HOUR
INSULIN ADMINSTERED, INSADM: 5 UNITS/HOUR
INSULIN ADMINSTERED, INSADM: 5 UNITS/HOUR
INSULIN ORDER, INSORD: 1.4 UNITS/HOUR
INSULIN ORDER, INSORD: 2.3 UNITS/HOUR
INSULIN ORDER, INSORD: 5 UNITS/HOUR
INSULIN ORDER, INSORD: 5 UNITS/HOUR
KETONES UR QL STRIP.AUTO: >160 MG/DL
LACTATE BLD-SCNC: 0.96 MMOL/L (ref 0.4–2)
LEUKOCYTE ESTERASE UR QL STRIP.AUTO: NEGATIVE
LIPASE SERPL-CCNC: 46 U/L (ref 73–393)
LOW TARGET, LOT: 150 MG/DL
LYMPHOCYTES # BLD: 1.3 K/UL (ref 0.9–3.6)
LYMPHOCYTES NFR BLD: 21 % (ref 21–52)
MAGNESIUM SERPL-MCNC: 2 MG/DL (ref 1.6–2.6)
MCH RBC QN AUTO: 31.5 PG (ref 24–34)
MCHC RBC AUTO-ENTMCNC: 33 G/DL (ref 31–37)
MCV RBC AUTO: 95.5 FL (ref 78–100)
MINUTES UNTIL NEXT BG, NBG: 60 MIN
MONOCYTES # BLD: 0.5 K/UL (ref 0.05–1.2)
MONOCYTES NFR BLD: 8 % (ref 3–10)
MULTIPLIER, MUL: 0.02
MULTIPLIER, MUL: 0.03
NEUTS SEG # BLD: 3.8 K/UL (ref 1.8–8)
NEUTS SEG NFR BLD: 64 % (ref 40–73)
NITRITE UR QL STRIP.AUTO: NEGATIVE
NRBC # BLD: 0 K/UL (ref 0–0.01)
NRBC BLD-RTO: 0 PER 100 WBC
ORDER INITIALS, ORDINIT: NORMAL
PCO2 BLDV: 28 MMHG (ref 41–51)
PH BLDV: 7.11 [PH] (ref 7.32–7.42)
PH BLDV: 7.17 [PH] (ref 7.32–7.42)
PH UR STRIP: 5 [PH] (ref 5–8)
PHOSPHATE SERPL-MCNC: 2.6 MG/DL (ref 2.5–4.9)
PLATELET # BLD AUTO: 238 K/UL (ref 135–420)
PMV BLD AUTO: 10.6 FL (ref 9.2–11.8)
PO2 BLDV: 24 MMHG (ref 25–40)
POTASSIUM BLD-SCNC: 4.7 MMOL/L (ref 3.5–5.1)
POTASSIUM SERPL-SCNC: 3.6 MMOL/L (ref 3.5–5.5)
POTASSIUM SERPL-SCNC: 4.9 MMOL/L (ref 3.5–5.5)
PROT SERPL-MCNC: 7.7 G/DL (ref 6.4–8.2)
PROT UR STRIP-MCNC: 30 MG/DL
RBC # BLD AUTO: 4.89 M/UL (ref 4.2–5.3)
RBC #/AREA URNS HPF: NEGATIVE /HPF (ref 0–5)
SAO2 % BLD: 30 %
SARS-COV-2, COV2: NOT DETECTED
SERVICE CMNT-IMP: ABNORMAL
SERVICE CMNT-IMP: ABNORMAL
SODIUM BLD-SCNC: 134 MMOL/L (ref 136–145)
SODIUM SERPL-SCNC: 134 MMOL/L (ref 136–145)
SODIUM SERPL-SCNC: 140 MMOL/L (ref 136–145)
SP GR UR REFRACTOMETRY: >1.03 (ref 1–1.03)
SPECIMEN SITE: ABNORMAL
TROPONIN-HIGH SENSITIVITY: 4 NG/L (ref 0–54)
UROBILINOGEN UR QL STRIP.AUTO: 0.2 EU/DL (ref 0.2–1)
WBC # BLD AUTO: 6 K/UL (ref 4.6–13.2)
WBC URNS QL MICRO: NEGATIVE /HPF (ref 0–4)

## 2022-09-21 PROCEDURE — 84703 CHORIONIC GONADOTROPIN ASSAY: CPT

## 2022-09-21 PROCEDURE — 83880 ASSAY OF NATRIURETIC PEPTIDE: CPT

## 2022-09-21 PROCEDURE — 74011250636 HC RX REV CODE- 250/636: Performed by: PHYSICIAN ASSISTANT

## 2022-09-21 PROCEDURE — 96375 TX/PRO/DX INJ NEW DRUG ADDON: CPT

## 2022-09-21 PROCEDURE — 87636 SARSCOV2 & INF A&B AMP PRB: CPT

## 2022-09-21 PROCEDURE — 74011636637 HC RX REV CODE- 636/637: Performed by: INTERNAL MEDICINE

## 2022-09-21 PROCEDURE — 82800 BLOOD PH: CPT

## 2022-09-21 PROCEDURE — 80053 COMPREHEN METABOLIC PANEL: CPT

## 2022-09-21 PROCEDURE — 82962 GLUCOSE BLOOD TEST: CPT

## 2022-09-21 PROCEDURE — 96366 THER/PROPH/DIAG IV INF ADDON: CPT

## 2022-09-21 PROCEDURE — 87086 URINE CULTURE/COLONY COUNT: CPT

## 2022-09-21 PROCEDURE — 96365 THER/PROPH/DIAG IV INF INIT: CPT

## 2022-09-21 PROCEDURE — 84100 ASSAY OF PHOSPHORUS: CPT

## 2022-09-21 PROCEDURE — 74011636637 HC RX REV CODE- 636/637: Performed by: PHYSICIAN ASSISTANT

## 2022-09-21 PROCEDURE — 74011000258 HC RX REV CODE- 258: Performed by: INTERNAL MEDICINE

## 2022-09-21 PROCEDURE — 96361 HYDRATE IV INFUSION ADD-ON: CPT

## 2022-09-21 PROCEDURE — 74011250636 HC RX REV CODE- 250/636: Performed by: INTERNAL MEDICINE

## 2022-09-21 PROCEDURE — 83735 ASSAY OF MAGNESIUM: CPT

## 2022-09-21 PROCEDURE — 83690 ASSAY OF LIPASE: CPT

## 2022-09-21 PROCEDURE — 74011250637 HC RX REV CODE- 250/637: Performed by: INTERNAL MEDICINE

## 2022-09-21 PROCEDURE — 83036 HEMOGLOBIN GLYCOSYLATED A1C: CPT

## 2022-09-21 PROCEDURE — 99285 EMERGENCY DEPT VISIT HI MDM: CPT

## 2022-09-21 PROCEDURE — 71045 X-RAY EXAM CHEST 1 VIEW: CPT

## 2022-09-21 PROCEDURE — 84484 ASSAY OF TROPONIN QUANT: CPT

## 2022-09-21 PROCEDURE — 85025 COMPLETE CBC W/AUTO DIFF WBC: CPT

## 2022-09-21 PROCEDURE — 99222 1ST HOSP IP/OBS MODERATE 55: CPT | Performed by: INTERNAL MEDICINE

## 2022-09-21 PROCEDURE — 65270000029 HC RM PRIVATE

## 2022-09-21 PROCEDURE — 81001 URINALYSIS AUTO W/SCOPE: CPT

## 2022-09-21 PROCEDURE — 82947 ASSAY GLUCOSE BLOOD QUANT: CPT

## 2022-09-21 PROCEDURE — 74011000258 HC RX REV CODE- 258: Performed by: PHYSICIAN ASSISTANT

## 2022-09-21 RX ORDER — FAMOTIDINE 20 MG/1
20 TABLET, FILM COATED ORAL 2 TIMES DAILY
Status: DISCONTINUED | OUTPATIENT
Start: 2022-09-21 | End: 2022-09-23 | Stop reason: HOSPADM

## 2022-09-21 RX ORDER — POLYETHYLENE GLYCOL 3350 17 G/17G
17 POWDER, FOR SOLUTION ORAL DAILY PRN
Status: DISCONTINUED | OUTPATIENT
Start: 2022-09-21 | End: 2022-09-23 | Stop reason: HOSPADM

## 2022-09-21 RX ORDER — SUCRALFATE 1 G/1
1 TABLET ORAL
Status: COMPLETED | OUTPATIENT
Start: 2022-09-21 | End: 2022-09-23

## 2022-09-21 RX ORDER — KETOROLAC TROMETHAMINE 15 MG/ML
15 INJECTION, SOLUTION INTRAMUSCULAR; INTRAVENOUS
Status: COMPLETED | OUTPATIENT
Start: 2022-09-21 | End: 2022-09-21

## 2022-09-21 RX ORDER — ACETAMINOPHEN 325 MG/1
650 TABLET ORAL
Status: DISCONTINUED | OUTPATIENT
Start: 2022-09-21 | End: 2022-09-23 | Stop reason: HOSPADM

## 2022-09-21 RX ORDER — INSULIN LISPRO 100 [IU]/ML
INJECTION, SOLUTION INTRAVENOUS; SUBCUTANEOUS
Status: DISCONTINUED | OUTPATIENT
Start: 2022-09-22 | End: 2022-09-23 | Stop reason: HOSPADM

## 2022-09-21 RX ORDER — DEXTROSE MONOHYDRATE 100 MG/ML
0-250 INJECTION, SOLUTION INTRAVENOUS AS NEEDED
Status: DISCONTINUED | OUTPATIENT
Start: 2022-09-21 | End: 2022-09-23 | Stop reason: HOSPADM

## 2022-09-21 RX ORDER — ONDANSETRON 2 MG/ML
4 INJECTION INTRAMUSCULAR; INTRAVENOUS
Status: DISCONTINUED | OUTPATIENT
Start: 2022-09-21 | End: 2022-09-23 | Stop reason: HOSPADM

## 2022-09-21 RX ORDER — DEXTROSE MONOHYDRATE 100 MG/ML
0-250 INJECTION, SOLUTION INTRAVENOUS AS NEEDED
Status: DISCONTINUED | OUTPATIENT
Start: 2022-09-21 | End: 2022-09-23 | Stop reason: SDUPTHER

## 2022-09-21 RX ORDER — SODIUM CHLORIDE 9 MG/ML
125 INJECTION, SOLUTION INTRAVENOUS CONTINUOUS
Status: DISCONTINUED | OUTPATIENT
Start: 2022-09-21 | End: 2022-09-22

## 2022-09-21 RX ORDER — SODIUM CHLORIDE 0.9 % (FLUSH) 0.9 %
5-40 SYRINGE (ML) INJECTION EVERY 8 HOURS
Status: DISCONTINUED | OUTPATIENT
Start: 2022-09-21 | End: 2022-09-23 | Stop reason: HOSPADM

## 2022-09-21 RX ORDER — MAGNESIUM SULFATE 100 %
4 CRYSTALS MISCELLANEOUS AS NEEDED
Status: DISCONTINUED | OUTPATIENT
Start: 2022-09-21 | End: 2022-09-23 | Stop reason: SDUPTHER

## 2022-09-21 RX ORDER — PROMETHAZINE HYDROCHLORIDE 12.5 MG/1
12.5 TABLET ORAL
Status: DISCONTINUED | OUTPATIENT
Start: 2022-09-21 | End: 2022-09-23 | Stop reason: HOSPADM

## 2022-09-21 RX ORDER — MAGNESIUM SULFATE 100 %
16 CRYSTALS MISCELLANEOUS AS NEEDED
Status: DISCONTINUED | OUTPATIENT
Start: 2022-09-21 | End: 2022-09-23 | Stop reason: HOSPADM

## 2022-09-21 RX ORDER — SODIUM CHLORIDE 0.9 % (FLUSH) 0.9 %
5-40 SYRINGE (ML) INJECTION AS NEEDED
Status: DISCONTINUED | OUTPATIENT
Start: 2022-09-21 | End: 2022-09-23 | Stop reason: HOSPADM

## 2022-09-21 RX ADMIN — KETOROLAC TROMETHAMINE 15 MG: 15 INJECTION, SOLUTION INTRAMUSCULAR; INTRAVENOUS at 17:35

## 2022-09-21 RX ADMIN — SUCRALFATE 1 G: 1 TABLET ORAL at 21:25

## 2022-09-21 RX ADMIN — SODIUM CHLORIDE 1000 ML: 900 INJECTION, SOLUTION INTRAVENOUS at 15:24

## 2022-09-21 RX ADMIN — FAMOTIDINE 20 MG: 20 TABLET ORAL at 21:25

## 2022-09-21 RX ADMIN — SODIUM CHLORIDE 5 UNITS/HR: 9 INJECTION, SOLUTION INTRAVENOUS at 18:31

## 2022-09-21 RX ADMIN — SODIUM CHLORIDE 1000 ML: 900 INJECTION, SOLUTION INTRAVENOUS at 15:06

## 2022-09-21 RX ADMIN — SODIUM CHLORIDE 125 ML/HR: 9 INJECTION, SOLUTION INTRAVENOUS at 19:30

## 2022-09-21 RX ADMIN — SODIUM CHLORIDE 1000 ML: 900 INJECTION, SOLUTION INTRAVENOUS at 17:19

## 2022-09-21 NOTE — ED PROVIDER NOTES
EMERGENCY DEPARTMENT HISTORY AND PHYSICAL EXAM    3:28 PM      Date: 9/21/2022  Patient Name: Brandon Clifford    History of Presenting Illness     Chief Complaint   Patient presents with    High Blood Sugar         History Provided By: Patient    Additional History (Context): Brandon Clifford is a 33 y.o. female with  hx of ADHD, IDDM, and other noted PMH  who presents with complaint of polydipsia, polyuria, diffuse abdominal cramping, nausea and vomiting x 2 weeks. Patient is concerned for DKA. Patient denies fever or chills, chest pain, dyspnea, diarrhea, dysuria, hematuria. Denies sick contacts, known exposure to [place]. Note she is not on an insulin pump due to a recall, note she has been doing sliding scale insulin and Lantus. PCP: Issa Villarreal NP    Current Facility-Administered Medications   Medication Dose Route Frequency Provider Last Rate Last Admin    glucose chewable tablet 16 g  4 Tablet Oral PRN ALEX Chong        glucagon (GLUCAGEN) injection 1 mg  1 mg IntraMUSCular PRN ALEX Chong        dextrose 10% infusion 0-250 mL  0-250 mL IntraVENous PRN ALEX Chong        insulin regular (MYXREDLIN, NOVOLIN, HUMULIN) 100 units/100 ml NS infusion (premix)  0-50 Units/hr IntraVENous TITRATE Alley Best, [address] 5 mL/hr at 09/21/22 1831 5 Units/hr at 09/21/22 1831     Current Outpatient Medications   Medication Sig Dispense Refill    dextroamphetamine-amphetamine (ADDERALL) 20 mg tablet Take 1 Tablet by mouth two (2) times a day. Max Daily Amount: 40 mg. 60 Tablet 0    dextroamphetamine-amphetamine (ADDERALL) 5 mg tablet Take 1 Tablet by mouth two (2) times a day. Max Daily Amount: 10 mg. 60 Tablet 0    insulin glargine (LANTUS) 100 unit/mL injection 50 Units by SubCUTAneous route nightly.       HumaLOG KwikPen Insulin 100 unit/mL kwikpen INJECT AS INSTRUCTED (MAX OF 50 UNITS PER DAY) (Patient not taking: No sig reported)      omeprazole (PRILOSEC) 20 mg capsule Take 1 Capsule by mouth Daily (before breakfast). 90 Capsule 0    ketoconazole (NIZORAL) 2 % topical cream Apply  to affected area daily. (Patient not taking: No sig reported) 15 g 1    albuterol (PROVENTIL HFA, VENTOLIN HFA, PROAIR HFA) 90 mcg/actuation inhaler Take 2 Puffs by inhalation every four (4) hours as needed for Wheezing, Shortness of Breath or Cough. (Patient not taking: Reported on 8/31/2022) 1 Inhaler 0    LANTUS SOLOSTAR U-100 INSULIN 100 unit/mL (3 mL) inpn INJECT 30 UNITS DAILY IN CASE OF PUMP FAILURE  3    ibuprofen (MOTRIN) 800 mg tablet Take 1 Tab by mouth every eight (8) hours as needed for Pain (take with food or milk). 90 Tab 1     insulin pump (PATIENT SUPPLIED) misc by SubCUTAneous route as needed. (Patient not taking: No sig reported)         Past History     Past Medical History:  Past Medical History:   Diagnosis Date    ADHD (attention deficit hyperactivity disorder)     Diabetes mellitus type 1 ([facility] [address]     Endometriosis        Past Surgical History:  Past Surgical History:   Procedure Laterality Date    HX BACK SURGERY  5/23/2014    lower back     HX ORTHOPAEDIC  5/23/2014    right medial ankle     HX OTHER SURGICAL Left     pt had a gail put into left leg       Family History:  Family History   Problem Relation Age of Onset    Arthritis-rheumatoid Paternal Grandmother     Hypertension Mother     Diabetes Maternal Grandmother         type 2    Heart Disease Neg Hx        Social History:  Social History     Tobacco Use    Smoking status: Light Smoker    Smokeless tobacco: Never   Vaping Use    Vaping Use: Never used   Substance Use Topics    Alcohol use: Yes     Alcohol/week: 4.0 standard drinks     Types: 4 Standard drinks or equivalent per week     Comment: occasionally     Drug use: No       Allergies:  No Known Allergies      Review of Systems       Review of Systems   Constitutional:  Negative for chills and fever. Respiratory:  Negative for shortness of breath.     Cardiovascular:  Positive for chest pain. Gastrointestinal:  Positive for abdominal pain, nausea and vomiting. Skin:  Negative for rash. Neurological:  Negative for weakness. All other systems reviewed and are negative. Physical Exam   Visit Vitals  /68   Pulse (!) 105   Temp 97.6 °F (36.4 °C)   Resp 16   Ht 5' 2\" (1.575 m)   Wt 61.2 kg (135 lb)   SpO2 100%   BMI 24.69 kg/m²         Physical Exam  Vitals and nursing note reviewed. Constitutional:       General: She is not in acute distress. Appearance: Normal appearance. She is well-developed. She is not ill-appearing, toxic-appearing or diaphoretic. HENT:      Head: Normocephalic and atraumatic. Cardiovascular:      Rate and Rhythm: Regular rhythm. Tachycardia present. Heart sounds: Normal heart sounds. No murmur heard. No friction rub. No gallop. Pulmonary:      Effort: Pulmonary effort is normal. No respiratory distress. Breath sounds: Normal breath sounds. No wheezing or rales. Abdominal:      General: Abdomen is flat. There is no distension. Palpations: Abdomen is soft. Tenderness: There is no abdominal tenderness. There is no right CVA tenderness, left CVA tenderness, guarding or rebound. Musculoskeletal:         General: Normal range of motion. Cervical back: Normal range of motion and neck supple. Skin:     General: Skin is warm. Findings: No rash. Neurological:      Mental Status: She is alert.          Diagnostic Study Results     Labs -  Recent Results (from the past 12 hour(s))   URINALYSIS W/ RFLX MICROSCOPIC    Collection Time: 09/21/22  4:40 PM   Result Value Ref Range    Color YELLOW      Appearance CLOUDY      Specific gravity >1.030 (H) 1.003 - 1.030    pH (UA) 5.0 5.0 - 8.0      Protein 30 (A) NEG mg/dL    Glucose >1,000 (A) NEG mg/dL    Ketone >160 (A) NEG mg/dL    Bilirubin Negative NEG      Blood Negative NEG      Urobilinogen 0.2 0.2 - 1.0 EU/dL    Nitrites Negative NEG      Leukocyte Esterase Negative NEG     COVID-19 WITH INFLUENZA A/B    Collection Time: 09/21/22  4:40 PM   Result Value Ref Range    SARS-CoV-2 by PCR Not detected NOTD      Influenza A by PCR Not detected NOTD      Influenza B by PCR Not detected NOTD     URINE MICROSCOPIC ONLY    Collection Time: 09/21/22  4:40 PM   Result Value Ref Range    WBC Negative 0 - 4 /hpf    RBC Negative 0 - 5 /hpf    Epithelial cells 3+ 0 - 5 /lpf    Bacteria 1+ (A) NEG /hpf   CBC WITH AUTOMATED DIFF    Collection Time: 09/21/22  5:00 PM   Result Value Ref Range    WBC 6.0 4.6 - 13.2 K/uL    RBC 4.89 4.20 - 5.30 M/uL    HGB 15.4 12.0 - 16.0 g/dL    HCT 46.7 (H) 35.0 - 45.0 %    MCV 95.5 78.0 - 100.0 FL    MCH 31.5 24.0 - 34.0 PG    MCHC 33.0 31.0 - 37.0 g/dL    RDW 12.8 11.6 - 14.5 %    PLATELET 124 273 - 377 K/uL    MPV 10.6 9.2 - 11.8 FL    NRBC 0.0 0  WBC    ABSOLUTE NRBC 0.00 0.00 - 0.01 K/uL    NEUTROPHILS 64 40 - 73 %    LYMPHOCYTES 21 21 - 52 %    MONOCYTES 8 3 - 10 %    EOSINOPHILS 1 0 - 5 %    BASOPHILS 2 0 - 2 %    IMMATURE GRANULOCYTES 4 (H) 0.0 - 0.5 %    ABS. NEUTROPHILS 3.8 1.8 - 8.0 K/UL    ABS. LYMPHOCYTES 1.3 0.9 - 3.6 K/UL    ABS. MONOCYTES 0.5 0.05 - 1.2 K/UL    ABS. EOSINOPHILS 0.0 0.0 - 0.4 K/UL    ABS. BASOPHILS 0.1 0.0 - 0.1 K/UL    ABS. IMM. GRANS. 0.3 (H) 0.00 - 0.04 K/UL    DF AUTOMATED     METABOLIC PANEL, COMPREHENSIVE    Collection Time: 09/21/22  5:00 PM   Result Value Ref Range    Sodium 134 (L) 136 - 145 mmol/L    Potassium 4.9 3.5 - 5.5 mmol/L    Chloride 105 100 - 111 mmol/L    CO2 11 (L) 21 - 32 mmol/L    Anion gap 18 3.0 - 18 mmol/L    Glucose 312 (H) 74 - 99 mg/dL    BUN 6 (L) 7.0 - 18 MG/DL    Creatinine 0.67 0.6 - 1.3 MG/DL    BUN/Creatinine ratio 9 (L) 12 - 20      GFR est AA >60 >60 ml/min/1.73m2    GFR est non-AA >60 >60 ml/min/1.73m2    Calcium 9.1 8.5 - 10.1 MG/DL    Bilirubin, total 0.6 0.2 - 1.0 MG/DL    ALT (SGPT) 19 13 - 56 U/L    AST (SGOT) 6 (L) 10 - 38 U/L    Alk.  phosphatase 56 45 - 117 U/L Protein, total 7.7 6.4 - 8.2 g/dL    Albumin 4.1 3.4 - 5.0 g/dL    Globulin 3.6 2.0 - 4.0 g/dL    A-G Ratio 1.1 0.8 - 1.7     TROPONIN-HIGH SENSITIVITY    Collection Time: 09/21/22  5:00 PM   Result Value Ref Range    Troponin-High Sensitivity 4 0 - 54 ng/L   HCG QL SERUM    Collection Time: 09/21/22  5:00 PM   Result Value Ref Range    HCG, Ql. Negative NEG     PH, VENOUS    Collection Time: 09/21/22  5:00 PM   Result Value Ref Range    VENOUS PH 7.11 (LL) 7.32 - 7.42     NT-PRO BNP    Collection Time: 09/21/22  5:00 PM   Result Value Ref Range    NT pro-BNP 23 0 - 450 PG/ML   HEMOGLOBIN A1C WITH EAG    Collection Time: 09/21/22  5:00 PM   Result Value Ref Range    Hemoglobin A1c 9.9 (H) 4.2 - 5.6 %    Est. average glucose 237 mg/dL   MAGNESIUM    Collection Time: 09/21/22  5:00 PM   Result Value Ref Range    Magnesium 2.0 1.6 - 2.6 mg/dL   PHOSPHORUS    Collection Time: 09/21/22  5:00 PM   Result Value Ref Range    Phosphorus 2.6 2.5 - 4.9 MG/DL   BLOOD GAS,CHEM8,LACTIC ACID POC    Collection Time: 09/21/22  5:13 PM   Result Value Ref Range    Calcium, ionized (POC) 1.17 1.12 - 1.32 mmol/L    Base deficit (POC) 16.8 mmol/L    HCO3 (POC) 10.2 (L) 22 - 26 MMOL/L    CO2, POC 11 (L) 19 - 24 MMOL/L    O2 SAT 30 %    Sample source VENOUS BLOOD      Performed by [facility]     Sodium (POC) 134 (L) 136 - 145 mmol/L    Potassium (POC) 4.7 3.5 - 5.1 mmol/L    Glucose (POC) 325 (H) 65 - 100 mg/dL    Creatinine (POC) 0.57 (L) 0.6 - 1.3 mg/dL    Lactic Acid (POC) 0.96 0.40 - 2.00 mmol/L    Chloride (POC) 109 (H) 98 - 107 mmol/L    Anion gap, POC 16 10 - 20      GFRAA, POC >60 >60 ml/min/1.73m2    GFRNA, POC >60 >60 ml/min/1.73m2    pH, venous (POC) 7.17 (LL) 7.32 - 7.42      pCO2, venous (POC) 28.0 (L) 41 - 51 MMHG    pO2, venous (POC) 24 (L) 25 - 40 mmHg    Critical value read back MD    GLUCOSTABILIZER    Collection Time: 09/21/22  6:31 PM   Result Value Ref Range    Glucose 312 mg/dL    Insulin order 5.0 units/hour Insulin adminstered 5.0 units/hour    Multiplier 0.020     Low target 150 mg/dL    High target 200 mg/dL    D50 order 0.0 ml    D50 administered 0.00 ml    Minutes until next BG 60 min    Order initials NH     Administered initials NH        Radiologic Studies -   XR CHEST PORT   Final Result      No acute findings. Medical Decision Making   I am the first provider for this patient. I reviewed the vital signs, available nursing notes, past medical history, past surgical history, family history and social history. Vital Signs-Reviewed the patient's vital signs. Records Reviewed: Nursing Notes, Old Medical Records, and Previous electrocardiograms (Time of Review: 3:28 PM)    ED Course: Progress Notes, Reevaluation, and Consults:  5:39 PM: Chem 8 resulted, placed order for IVF, glucostabilizer. Discussed with patient and nurse. 5:50 PM: Updated patient with results up until this point, pending admission. Pt notes she is feeling a little better, no distress. CONSULT NOTE:   6:29 PM  I spoke with Dr. Davey Center  Specialty: Hospitalist   Discussed pt's hx, disposition, and available diagnostic and imaging results. Reviewed care plans. Consulting physician agrees with plans as outlined. Accepts admission to step down. Written by Angeles Betancourt PA-C     Provider Notes (Medical Decision Making): 28-year-old female with hx of IDDM who presents to the ED due to polydipsia, polyuria, diffuse abdominal cramping, nausea and vomiting. Afebrile, nontoxic-appearing. Initial vitals demonstrate tachycardia, improving with IVF. POC Chem-8 demonstrates findings concerning for DKA, glucostabilizer initiated with IVF boluses. Labs demonstrate glucose of 312, gap of 18, bicarb of 11, ketones in urine. Admitted to stepdown for further assessment and treatment.      Critical Care Time:  The services I provided to this patient were to treat and/or prevent clinically significant deterioration that could result in the failure of one or more body systems and/or organ systems due to Diabetic ketoacidosis or non-ketoic hyperosmolar syndrome. Services included the following:  -reviewing nursing notes and old charts  -vital sign assessments  -direct patient care  -medication orders and management  -interpreting and reviewing diagnostic studies/labs  -re-evaluations  -documentation time    Aggregate critical care time was 25 minutes, which includes only time during which I was engaged in work directly related to the patient's care as described above, whether I was at bedside or elsewhere in the Emergency Department. It did not include time spent performing other reported procedures or the services of residents, students, nurses, or advance practice providers. ALEX Elizabeth    7:12 PM     Diagnosis     Clinical Impression:   1. Type 1 diabetes mellitus with ketoacidosis without coma (HCC)        Disposition: admission     Follow-up Information    None          Patient's Medications   Start Taking    No medications on file   Continue Taking     INSULIN PUMP (PATIENT SUPPLIED) MISC    by SubCUTAneous route as needed. ALBUTEROL (PROVENTIL HFA, VENTOLIN HFA, PROAIR HFA) 90 MCG/ACTUATION INHALER    Take 2 Puffs by inhalation every four (4) hours as needed for Wheezing, Shortness of Breath or Cough. DEXTROAMPHETAMINE-AMPHETAMINE (ADDERALL) 20 MG TABLET    Take 1 Tablet by mouth two (2) times a day. Max Daily Amount: 40 mg. DEXTROAMPHETAMINE-AMPHETAMINE (ADDERALL) 5 MG TABLET    Take 1 Tablet by mouth two (2) times a day. Max Daily Amount: 10 mg.    HUMALOG KWIKPEN INSULIN 100 UNIT/ML KWIKPEN    INJECT AS INSTRUCTED (MAX OF 50 UNITS PER DAY)    IBUPROFEN (MOTRIN) 800 MG TABLET    Take 1 Tab by mouth every eight (8) hours as needed for Pain (take with food or milk). INSULIN GLARGINE (LANTUS) 100 UNIT/ML INJECTION    50 Units by SubCUTAneous route nightly.     KETOCONAZOLE (NIZORAL) 2 % TOPICAL CREAM    Apply  to affected area daily. LANTUS SOLOSTAR U-100 INSULIN 100 UNIT/ML (3 ML) INPN    INJECT 30 UNITS DAILY IN CASE OF PUMP FAILURE    OMEPRAZOLE (PRILOSEC) 20 MG CAPSULE    Take 1 Capsule by mouth Daily (before breakfast). These Medications have changed    No medications on file   Stop Taking    No medications on file       Dictation disclaimer:  Please note that this dictation was completed with [employer], the computer voice recognition software. Quite often unanticipated grammatical, syntax, homophones, and other interpretive errors are inadvertently transcribed by the computer software. Please disregard these errors. Please excuse any errors that have escaped final proofreading.

## 2022-09-21 NOTE — H&P
History & Physical    Patient: Erin Boyle MRN: [id number]  CSN: [id number]    YOB: 1990  Age: 33 y.o. Sex: female      DOA: 9/21/2022  CC: Nausea vomiting and abdominal pain    PCP: Danica Gilmore NP       HPI:     Erin Boyle is a 33 y.o. female with medical co-morbidities including ADHD, type 1 diabetes mellitus on insulin, presented to the ED with abdominal pain, nausea vomiting, generalized fatigue and unable to get out of bed. She expressed that over the last 2 weeks she had persistent elevated sugar which she has been trying to control with Lantus and Humalog. She expressed that her insulin pump was on recall and was not working appropriately. She expressed feeling nauseated, vomiting green bile. She expressed her abdomen felt tight and painful at her epigastric area. She thought maybe that it her gallbladder problem. Otherwise, no diarrhea. She has recently finished a course of antibiotics for her left groin infection/phlegmon. No fever no shaking chills    In the ED, she was found to have elevated glucose with glucosuria, ketonuria, elevated anion gap metabolic acidosis. She was started on insulin drip per protocol for the treatment of DKA. Review of Systems  GENERAL: No fever, No chill, + malaise   HEENT: No change in vision, no sore throat or sinus congestion. NECK: No pain or stiffness. PULMONARY: No shortness of breath, no cough or wheeze. Cardiovascular: no pnd / orthopnea, no Chest Pain  GASTROINTESTINAL: + Abd pain, + nausea/vomiting, No diarrhea, No melena or bright red blood per rectum. GENITOURINARY: + Urinary frequency, No urgency or pain with urination. MUSCULOSKELETAL: + Joint or muscle pain, no back pain, no recent trauma. DERMATOLOGIC: No rash, no itching, no lesions. ENDOCRINE: No polyuria, polydipsia, No recent change in weight. HEMATOLOGICAL: No easy bruising or bleeding.    NEUROLOGIC: No headache, No seizures, + generalized weakness         Past Medical History:   Diagnosis Date    ADHD (attention deficit hyperactivity disorder)     Diabetes mellitus type 1 ([address]     Endometriosis        Past Surgical History:   Procedure Laterality Date    HX BACK SURGERY  5/23/2014    lower back     HX ORTHOPAEDIC  5/23/2014    right medial ankle     HX OTHER SURGICAL Left     pt had a gail put into left leg       Family History   Problem Relation Age of Onset    Arthritis-rheumatoid Paternal Grandmother     Hypertension Mother     Diabetes Maternal Grandmother         type 2    Heart Disease Neg Hx        Social History     Socioeconomic History    Marital status: SINGLE   Tobacco Use    Smoking status: Light Smoker    Smokeless tobacco: Never   Vaping Use    Vaping Use: Never used   Substance and Sexual Activity    Alcohol use: Yes     Alcohol/week: 4.0 standard drinks     Types: 4 Standard drinks or equivalent per week     Comment: occasionally     Drug use: No       Prior to Admission medications    Medication Sig Start Date End Date Taking? Authorizing Provider   dextroamphetamine-amphetamine (ADDERALL) 20 mg tablet Take 1 Tablet by mouth two (2) times a day. Max Daily Amount: 40 mg. 9/5/22  Yes Seda Moncada NP   dextroamphetamine-amphetamine (ADDERALL) 5 mg tablet Take 1 Tablet by mouth two (2) times a day. Max Daily Amount: 10 mg. 9/5/22  Yes Seda Moncada NP   insulin glargine (LANTUS) 100 unit/mL injection 50 Units by SubCUTAneous route nightly. 7/13/21  Yes Provider, Historical   HumaLOG KwikPen Insulin 100 unit/mL kwikpen INJECT AS INSTRUCTED (MAX OF 50 UNITS PER DAY) 2/5/22  Yes Provider, Historical   omeprazole (PRILOSEC) 20 mg capsule Take 1 Capsule by mouth Daily (before breakfast).  5/10/22  Yes Seda Moncada NP       No Known Allergies           Physical Exam:      Visit Vitals  /68   Pulse (!) 105   Temp 97.6 °F (36.4 °C)   Resp 16   Ht 5' 2\" (1.575 m)   Wt 61.2 kg (135 lb)   SpO2 100%   BMI 24.69 kg/m² Physical Exam:  Tele: Sinus tachycardia  General:  Cooperative, Not in acute distress, speaks in full sentence while in bed  HEENT: PERRL, EOMI, supple neck, no JVD, dry oral mucosa  Cardiovascular: S1S2 regular, no rub/gallop   Pulmonary: air entry bilaterally, no wheezing, no crackle  GI:  Soft, ++ tender, non distended, +bs, no guarding   Extremities:  No pedal edema, +distal pulses appreciated   Neuro: AOx3, moving all extremities, no gross deficit. Lab/Data Review:  Labs: Results:       Chemistry Recent Labs     09/21/22  1700   *   *   K 4.9      CO2 11*   BUN 6*   CREA 0.67   CA 9.1   AGAP 18   BUCR 9*   AP 56   TP 7.7   ALB 4.1   GLOB 3.6   AGRAT 1.1      CBC w/Diff Recent Labs     09/21/22  1700   WBC 6.0   RBC 4.89   HGB 15.4   HCT 46.7*      GRANS 64   LYMPH 21   EOS 1      Coagulation No results for input(s): PTP, INR, APTT, INREXT in the last 72 hours. Iron/Ferritin No results for input(s): IRON in the last 72 hours. No lab exists for component: TIBCCALC   BNP No results for input(s): BNPP in the last 72 hours. Cardiac Enzymes No results for input(s): CPK, CKND1, TENZIN in the last 72 hours. No lab exists for component: CKRMB, TROIP   Liver Enzymes Recent Labs     09/21/22  1700   TP 7.7   ALB 4.1   AP 56      Thyroid Studies Lab Results   Component Value Date/Time    TSH 1.090 05/14/2015 12:26 PM          All Micro Results       Procedure Component Value Units Date/Time    COVID-19 WITH INFLUENZA A/B [[id number]] Collected: 09/21/22 1640    Order Status: Completed Specimen: Nasopharyngeal Updated: 09/21/22 1812     SARS-CoV-2 by PCR Not detected        Comment: Not Detected results do not preclude SARS-CoV-2 infection and should not be used as the sole basis for patient management decisions. Results must be combined with clinical observations, patient history, and epidemiological information.         Influenza A by PCR Not detected        Influenza B by PCR Not detected        Comment: Testing was performed using [employer]wa SARS-CoV-2 and Influenza A/B nucleic acid assay. This test is a multiplex Real-Time Reverse Transcriptase Polymerase Chain Reaction (RT-PCR) based in [employer] diagnostic test intended for the qualitative detection of nucleic acids from SARS-CoV-2, Influenza A, and Influenza B in nasopharyngeal for use under the FDA's Emergency Use Authorization (EAU) only. Fact sheet for Patients: FindDrives.pl  Fact sheet for Healthcare Providers: FindDrives.pl         CULTURE, URINE [[id number]] Collected: 09/21/22 1640    Order Status: Sent Specimen: Urine from Clean catch Updated: 09/21/22 2132            Imaging Reviewed:      Assessment:   Active Problems:  Anion gap metabolic acidosis,  DKA (diabetic ketoacidosis)  Type 1 DM, HgbA1c 9.9%  Hyponatremia, pseudo-hyponatremia   Ketonuria   Nausea/vomiting with epigastric pain, neg lipase   Sinus tachycardia   Dehydration   ADHD, stable   Recently antibiotic use for left groin phlegmon       Plan:     Admitted to stepdown to continue insulin drip per glucose stabilizer protocol. Continue with IV NS as adjusted his sodium >140. Will convert to D5 with NS if fingerstick blood sugar is <250. Continue to check magnesium every 4 hours and replace as needed  Check BMP every 12 hours, monitor until anion gap close. Can transition to Lantus + ISS if anion gap has closed  Will repeat ultrasound of her gallbladder per her request, however recent CT scan of her abdomen was without concern for gallbladder pathology.   Continue symptomatic care  Can transition to oral intake if she tolerated  Pepcid  ICS        Risk of deterioration:  []Low    [x]Moderate  []High     Prophylaxis:  []Lovenox  []Coumadin  []Hep SQ  [x]SCDs  [x]H2B/PPI     Disposition:  [x]Home w/ Family   []HH PT,OT,RN   []SNF/LTC   []SAH/Rehab     Discussed Code Status:         [x]Full Code      []DNR ___________________________________________________     Care Plan discussed with:    [x]Patient   []Family    []ED Care Manager  [x]ED Doc   []Specialist :  Total Time Coordinating Admission:   60   minutes    []Total Critical Care Time:       uJd Sanchez MD  9/21/2022, 7:39 PM

## 2022-09-21 NOTE — ED TRIAGE NOTES
Client reports N/V with thirst. Reports to be a diabetic, client has read of high on home meter. NAD. AXOX4.

## 2022-09-21 NOTE — Clinical Note
Status[de-identified] INPATIENT [101]   Type of Bed: Stepdown [17]   Cardiac Monitoring Required?: Yes   Inpatient Hospitalization Certified Necessary for the Following Reasons: 3.  Patient receiving treatment that can only be provided in an inpatient setting (further clarification in H&P documentation)   Admitting Diagnosis: DKA (diabetic ketoacidosis) [facility]) [[id number]]   Admitting Physician: Chiqui Boggs [[id number]]   Attending Physician: Galina Louis   Estimated Length of Stay: 2 Midnights   Discharge Plan[de-identified] Home with Office Follow-up

## 2022-09-22 ENCOUNTER — APPOINTMENT (OUTPATIENT)
Dept: ULTRASOUND IMAGING | Age: 32
DRG: 639 | End: 2022-09-22
Attending: INTERNAL MEDICINE
Payer: COMMERCIAL

## 2022-09-22 LAB
ANION GAP SERPL CALC-SCNC: 14 MMOL/L (ref 3–18)
ANION GAP SERPL CALC-SCNC: 9 MMOL/L (ref 3–18)
BACTERIA SPEC CULT: NORMAL
BUN SERPL-MCNC: 3 MG/DL (ref 7–18)
BUN SERPL-MCNC: 5 MG/DL (ref 7–18)
BUN/CREAT SERPL: 11 (ref 12–20)
BUN/CREAT SERPL: 7 (ref 12–20)
CALCIUM SERPL-MCNC: 8.2 MG/DL (ref 8.5–10.1)
CALCIUM SERPL-MCNC: 8.6 MG/DL (ref 8.5–10.1)
CHLORIDE SERPL-SCNC: 112 MMOL/L (ref 100–111)
CHLORIDE SERPL-SCNC: 112 MMOL/L (ref 100–111)
CO2 SERPL-SCNC: 11 MMOL/L (ref 21–32)
CO2 SERPL-SCNC: 19 MMOL/L (ref 21–32)
CREAT SERPL-MCNC: 0.46 MG/DL (ref 0.6–1.3)
CREAT SERPL-MCNC: 0.47 MG/DL (ref 0.6–1.3)
ERYTHROCYTE [DISTWIDTH] IN BLOOD BY AUTOMATED COUNT: 12.8 % (ref 11.6–14.5)
GLUCOSE BLD STRIP.AUTO-MCNC: 181 MG/DL (ref 70–110)
GLUCOSE BLD STRIP.AUTO-MCNC: 226 MG/DL (ref 70–110)
GLUCOSE BLD STRIP.AUTO-MCNC: 237 MG/DL (ref 70–110)
GLUCOSE BLD STRIP.AUTO-MCNC: 240 MG/DL (ref 70–110)
GLUCOSE BLD STRIP.AUTO-MCNC: 268 MG/DL (ref 70–110)
GLUCOSE BLD STRIP.AUTO-MCNC: 270 MG/DL (ref 70–110)
GLUCOSE BLD STRIP.AUTO-MCNC: 314 MG/DL (ref 70–110)
GLUCOSE BLD STRIP.AUTO-MCNC: 381 MG/DL (ref 70–110)
GLUCOSE SERPL-MCNC: 244 MG/DL (ref 74–99)
GLUCOSE SERPL-MCNC: 321 MG/DL (ref 74–99)
HCT VFR BLD AUTO: 41.8 % (ref 35–45)
HGB BLD-MCNC: 13.6 G/DL (ref 12–16)
MAGNESIUM SERPL-MCNC: 1.6 MG/DL (ref 1.6–2.6)
MAGNESIUM SERPL-MCNC: 1.6 MG/DL (ref 1.6–2.6)
MAGNESIUM SERPL-MCNC: 1.7 MG/DL (ref 1.6–2.6)
MAGNESIUM SERPL-MCNC: 1.8 MG/DL (ref 1.6–2.6)
MAGNESIUM SERPL-MCNC: 1.8 MG/DL (ref 1.6–2.6)
MCH RBC QN AUTO: 31.2 PG (ref 24–34)
MCHC RBC AUTO-ENTMCNC: 32.5 G/DL (ref 31–37)
MCV RBC AUTO: 95.9 FL (ref 78–100)
NRBC # BLD: 0 K/UL (ref 0–0.01)
NRBC BLD-RTO: 0 PER 100 WBC
PLATELET # BLD AUTO: 191 K/UL (ref 135–420)
PMV BLD AUTO: 11 FL (ref 9.2–11.8)
POTASSIUM SERPL-SCNC: 3.3 MMOL/L (ref 3.5–5.5)
POTASSIUM SERPL-SCNC: 3.7 MMOL/L (ref 3.5–5.5)
RBC # BLD AUTO: 4.36 M/UL (ref 4.2–5.3)
SERVICE CMNT-IMP: NORMAL
SODIUM SERPL-SCNC: 137 MMOL/L (ref 136–145)
SODIUM SERPL-SCNC: 140 MMOL/L (ref 136–145)
WBC # BLD AUTO: 5.6 K/UL (ref 4.6–13.2)

## 2022-09-22 PROCEDURE — 80048 BASIC METABOLIC PNL TOTAL CA: CPT

## 2022-09-22 PROCEDURE — 74011250636 HC RX REV CODE- 250/636: Performed by: INTERNAL MEDICINE

## 2022-09-22 PROCEDURE — 82962 GLUCOSE BLOOD TEST: CPT

## 2022-09-22 PROCEDURE — 76705 ECHO EXAM OF ABDOMEN: CPT

## 2022-09-22 PROCEDURE — 83735 ASSAY OF MAGNESIUM: CPT

## 2022-09-22 PROCEDURE — 74011250637 HC RX REV CODE- 250/637: Performed by: FAMILY MEDICINE

## 2022-09-22 PROCEDURE — 74011636637 HC RX REV CODE- 636/637: Performed by: INTERNAL MEDICINE

## 2022-09-22 PROCEDURE — 99232 SBSQ HOSP IP/OBS MODERATE 35: CPT | Performed by: FAMILY MEDICINE

## 2022-09-22 PROCEDURE — 85027 COMPLETE CBC AUTOMATED: CPT

## 2022-09-22 PROCEDURE — 2709999900 HC NON-CHARGEABLE SUPPLY

## 2022-09-22 PROCEDURE — 74011636637 HC RX REV CODE- 636/637: Performed by: FAMILY MEDICINE

## 2022-09-22 PROCEDURE — 74011250637 HC RX REV CODE- 250/637: Performed by: INTERNAL MEDICINE

## 2022-09-22 PROCEDURE — 36415 COLL VENOUS BLD VENIPUNCTURE: CPT

## 2022-09-22 PROCEDURE — 65270000029 HC RM PRIVATE

## 2022-09-22 PROCEDURE — 93005 ELECTROCARDIOGRAM TRACING: CPT

## 2022-09-22 PROCEDURE — 74011000250 HC RX REV CODE- 250: Performed by: INTERNAL MEDICINE

## 2022-09-22 RX ORDER — KETOROLAC TROMETHAMINE 15 MG/ML
15 INJECTION, SOLUTION INTRAMUSCULAR; INTRAVENOUS
Status: COMPLETED | OUTPATIENT
Start: 2022-09-22 | End: 2022-09-22

## 2022-09-22 RX ORDER — HYDROCODONE BITARTRATE AND ACETAMINOPHEN 5; 325 MG/1; MG/1
1-2 TABLET ORAL
Status: DISCONTINUED | OUTPATIENT
Start: 2022-09-22 | End: 2022-09-23 | Stop reason: HOSPADM

## 2022-09-22 RX ORDER — INSULIN GLARGINE 100 [IU]/ML
25 INJECTION, SOLUTION SUBCUTANEOUS DAILY
Status: DISCONTINUED | OUTPATIENT
Start: 2022-09-22 | End: 2022-09-23

## 2022-09-22 RX ADMIN — Medication 6 UNITS: at 17:22

## 2022-09-22 RX ADMIN — FAMOTIDINE 20 MG: 20 TABLET ORAL at 08:47

## 2022-09-22 RX ADMIN — Medication 8 UNITS: at 08:46

## 2022-09-22 RX ADMIN — SODIUM CHLORIDE, PRESERVATIVE FREE 10 ML: 5 INJECTION INTRAVENOUS at 07:47

## 2022-09-22 RX ADMIN — HYDROCODONE BITARTRATE AND ACETAMINOPHEN 2 TABLET: 5; 325 TABLET ORAL at 23:23

## 2022-09-22 RX ADMIN — SUCRALFATE 1 G: 1 TABLET ORAL at 22:02

## 2022-09-22 RX ADMIN — SUCRALFATE 1 G: 1 TABLET ORAL at 17:20

## 2022-09-22 RX ADMIN — Medication 25 UNITS: at 12:18

## 2022-09-22 RX ADMIN — SUCRALFATE 1 G: 1 TABLET ORAL at 08:47

## 2022-09-22 RX ADMIN — FAMOTIDINE 20 MG: 20 TABLET ORAL at 17:20

## 2022-09-22 RX ADMIN — SODIUM CHLORIDE, PRESERVATIVE FREE 10 ML: 5 INJECTION INTRAVENOUS at 13:41

## 2022-09-22 RX ADMIN — Medication 15 UNITS: at 22:04

## 2022-09-22 RX ADMIN — HYDROCODONE BITARTRATE AND ACETAMINOPHEN 2 TABLET: 5; 325 TABLET ORAL at 17:21

## 2022-09-22 RX ADMIN — Medication 3 UNITS: at 13:40

## 2022-09-22 RX ADMIN — SUCRALFATE 1 G: 1 TABLET ORAL at 12:19

## 2022-09-22 RX ADMIN — KETOROLAC TROMETHAMINE 15 MG: 15 INJECTION, SOLUTION INTRAMUSCULAR; INTRAVENOUS at 04:06

## 2022-09-22 RX ADMIN — SODIUM CHLORIDE 125 ML/HR: 9 INJECTION, SOLUTION INTRAVENOUS at 07:47

## 2022-09-22 NOTE — MED STUDENT NOTES
*ATTENTION:  This note has been created by a medical student for educational purposes only. Please do not refer to the content of this note for clinical decision-making, billing, or other purposes. Please see attending physicians note to obtain clinical information on this patient. *     Subjective:  Malcolm Bush is a 33 y.o. female w/ history of T1DM who presented to ED on 9/21/2022 with nausea, vomiting, abdominal pain, and fatigue. Reports elevated blood sugars for the past 2 weeks. Patient's blood sugar levels have continued to be elevated while admitted. Insulin withheld last night as blood glucose dropped below 250, but have risen this morning. Latest BG was 314. Patient notes tachypnea and abdominal pain. Also reports tachycardia initially, but has improved. Patient's abdominal pain located in the epigastric region, described as a constant burning pain. She states that she had several episodes of emesis with small amounts of blood prior to presenting in the ED, but has not vomited while here. She denies chest pain, headache, dizziness, visual disturbances, constipation, diarrhea, or blood in stool. Assessment/Plan:   Anion gap metabolic acidosis from DKA - treatment via DKA protocol. Due to recent BG of 314, may need to restart Insulin drip. Abdominal pain - patient is concerned about her gallbladder and requested repeat ultrasound. CT abdomen showed no concern for gallbladder pathology. Concern for gastric/peptic ulcer due to patient's description of emesis with blood and would like to consult GI for possible endoscopy. Pseudo-hyponatremia - likely due to elevated blood glucose levels. Will continue with IV normal saline. Type 1 DM - last A1C level was 9.9%. Informed patient and discussed new diabetic education, for which patient would like to receive.         Objective:  Patient Vitals for the past 12 hrs:   Temp Pulse Resp BP SpO2   09/22/22 0800 97.9 °F (36.6 °C) 81 18 108/65 99 % 09/22/22 0251 98.1 °F (36.7 °C) 97 18 128/78 99 %   09/22/22 0100 98 °F (36.7 °C) 87 18 109/70 100 %   09/21/22 2203 -- 92 18 115/62 100 %      Lab Results   Component Value Date/Time    Glucose 321 (H) 09/22/2022 03:53 AM    Glucose (POC) 314 (H) 09/22/2022 08:23 AM    Glucose (POC) 168 (H) 03/25/2017 11:07 AM     Recent Results (from the past 12 hour(s))   GLUCOSE, POC    Collection Time: 09/21/22  9:53 PM   Result Value Ref Range    Glucose (POC) 174 (H) 70 - 110 mg/dL   GLUCOSTABILIZER    Collection Time: 09/21/22  9:53 PM   Result Value Ref Range    Glucose 174 mg/dL    Insulin order 2.3 units/hour    Insulin adminstered 2.3 units/hour    Multiplier 0.020     Low target 150 mg/dL    High target 200 mg/dL    D50 order 0.0 ml    D50 administered 0.00 ml    Minutes until next BG 60 min    Order initials ksc     Administered initials ksc    GLUCOSE, POC    Collection Time: 09/21/22 11:35 PM   Result Value Ref Range    Glucose (POC) 226 (H) 70 - 110 mg/dL   GLUCOSE, POC    Collection Time: 09/22/22  1:26 AM   Result Value Ref Range    Glucose (POC) 270 (H) 70 - 110 mg/dL   GLUCOSE, POC    Collection Time: 09/22/22  2:51 AM   Result Value Ref Range    Glucose (POC) 268 (H) 70 - 807 mg/dL   METABOLIC PANEL, BASIC    Collection Time: 09/22/22  3:53 AM   Result Value Ref Range    Sodium 137 136 - 145 mmol/L    Potassium 3.7 3.5 - 5.5 mmol/L    Chloride 112 (H) 100 - 111 mmol/L    CO2 11 (L) 21 - 32 mmol/L    Anion gap 14 3.0 - 18 mmol/L    Glucose 321 (H) 74 - 99 mg/dL    BUN 3 (L) 7.0 - 18 MG/DL    Creatinine 0.46 (L) 0.6 - 1.3 MG/DL    BUN/Creatinine ratio 7 (L) 12 - 20      GFR est AA >60 >60 ml/min/1.73m2    GFR est non-AA >60 >60 ml/min/1.73m2    Calcium 8.2 (L) 8.5 - 10.1 MG/DL   CBC W/O DIFF    Collection Time: 09/22/22  3:53 AM   Result Value Ref Range    WBC 5.6 4.6 - 13.2 K/uL    RBC 4.36 4.20 - 5.30 M/uL    HGB 13.6 12.0 - 16.0 g/dL    HCT 41.8 35.0 - 45.0 %    MCV 95.9 78.0 - 100.0 FL    MCH 31.2 24.0 - 34.0 PG    MCHC 32.5 31.0 - 37.0 g/dL    RDW 12.8 11.6 - 14.5 %    PLATELET 115 145 - 103 K/uL    MPV 11.0 9.2 - 11.8 FL    NRBC 0.0 0  WBC    ABSOLUTE NRBC 0.00 0.00 - 0.01 K/uL   MAGNESIUM    Collection Time: 09/22/22  3:53 AM   Result Value Ref Range    Magnesium 1.6 1.6 - 2.6 mg/dL   GLUCOSE, POC    Collection Time: 09/22/22  8:23 AM   Result Value Ref Range    Glucose (POC) 314 (H) 70 - 110 mg/dL   EKG, 12 LEAD, INITIAL    Collection Time: 09/22/22  8:43 AM   Result Value Ref Range    Ventricular Rate 79 BPM    Atrial Rate 79 BPM    P-R Interval 140 ms    QRS Duration 82 ms    Q-T Interval 388 ms    QTC Calculation (Bezet) 444 ms    Calculated P Axis 61 degrees    Calculated R Axis 74 degrees    Calculated T Axis 43 degrees    Diagnosis       Normal sinus rhythm  Normal ECG  When compared with ECG of 03-SEP-2022 07:32,  Non-specific change in ST segment in Inferior leads          Physical Exam  Vitals and nursing note reviewed. Constitutional:       Appearance: Normal appearance. She is normal weight. She is not ill-appearing or toxic-appearing. Eyes:      General: No scleral icterus. Extraocular Movements: Extraocular movements intact. Conjunctiva/sclera: Conjunctivae normal.   Cardiovascular:      Rate and Rhythm: Normal rate and regular rhythm. Pulses: Normal pulses. No decreased pulses. Heart sounds: Normal heart sounds. No murmur heard. Pulmonary:      Effort: Pulmonary effort is normal. No respiratory distress. Breath sounds: Normal breath sounds. No stridor. No wheezing, rhonchi or rales. Abdominal:      General: Abdomen is flat. Bowel sounds are normal.      Palpations: Abdomen is soft. There is no hepatomegaly or splenomegaly. Tenderness: There is abdominal tenderness (TTP in epigastric and RUQ) in the right upper quadrant and epigastric area. There is no guarding or rebound. Hernia: No hernia is present.    Musculoskeletal:         General: No swelling or tenderness. Cervical back: Normal range of motion. Right lower leg: No edema. Left lower leg: No edema. Skin:     General: Skin is warm. Neurological:      General: No focal deficit present. Mental Status: She is alert and oriented to person, place, and time. Mental status is at baseline. Psychiatric:         Mood and Affect: Mood normal.         Behavior: Behavior normal.         Thought Content:  Thought content normal.         Judgment: Judgment normal.

## 2022-09-22 NOTE — DISCHARGE INSTRUCTIONS
DISCHARGE SUMMARY from Nurse    PATIENT INSTRUCTIONS:    After general anesthesia or intravenous sedation, for 24 hours or while taking prescription Narcotics:  Limit your activities  Do not drive and operate hazardous machinery  Do not make important personal or business decisions  Do  not drink alcoholic beverages  If you have not urinated within 8 hours after discharge, please contact your surgeon on call. Report the following to your surgeon:  Excessive pain, swelling, redness or odor of or around the surgical area  Temperature over 100.5  Nausea and vomiting lasting longer than 4 hours or if unable to take medications  Any signs of decreased circulation or nerve impairment to extremity: change in color, persistent  numbness, tingling, coldness or increase pain  Any questions    What to do at Home:  Recommended activity: Activity as tolerated, rest as needed     If you experience any of the following symptoms chest pain, shortness of breath, dizziness, fainting, fever, malaise please follow up with Primary Care Provider or go to the nearest Emergency Room . *  Please give a list of your current medications to your Primary Care Provider. *  Please update this list whenever your medications are discontinued, doses are      changed, or new medications (including over-the-counter products) are added. *  Please carry medication information at all times in case of emergency situations. These are general instructions for a healthy lifestyle:    No smoking/ No tobacco products/ Avoid exposure to second hand smoke  Surgeon General's Warning:  Quitting smoking now greatly reduces serious risk to your health.     Obesity, smoking, and sedentary lifestyle greatly increases your risk for illness    A healthy diet, regular physical exercise & weight monitoring are important for maintaining a healthy lifestyle    You may be retaining fluid if you have a history of heart failure or if you experience any of the following symptoms:  Weight gain of 3 pounds or more overnight or 5 pounds in a week, increased swelling in our hands or feet or shortness of breath while lying flat in bed. Please call your doctor as soon as you notice any of these symptoms; do not wait until your next office visit. The discharge information has been reviewed with the patient. The patient verbalized understanding. Discharge medications reviewed with the patient and appropriate educational materials and side effects teaching were provided.   Patient armband removed and shredded    ___________________________________________________________________________________________________________________________________

## 2022-09-22 NOTE — ED NOTES
Hospitalist paged per patient insulin drip orders for patient BG <250. Discussed repeat labs and insulin drip stopped per verbal order.

## 2022-09-22 NOTE — PROGRESS NOTES
[facility]  [facility]    Patient: Kathi Bloodgood Age: 33 y.o. : 1990 MR#: [id number] SSN: [id number]  Date: 2022     Subjective/24-hour events:     Feeling better overall but continues to have some right upper quadrant pain. Has had some upper GI issues for the past several weeks to few months including burning abdominal pain and occasion reflux of bilious material per her report. Strong family history of gallbladder problems reported. Ultrasound completed this morning. Assessment:   Type I DM, A1c 9.9%  DKA  Dehydration  ADHD    Plan:   Basal bolus insulin as ordered, further adjustments as necessary to optimize glycemic control. Diabetes nurse following. Await results of right upper quadrant ultrasound. Further plan pending result. Encourage oral hydration. Mobilize as tolerated. Possible discharge in the next 24 hours if continues to improve and sugars in adequate control. Case discussed with:  [x]Patient  []Family  [x]Nursing  [x]Case Management  DVT Prophylaxis:  []Lovenox  []Hep SQ  [x]SCDs  []Coumadin   []On Heparin gtt    Objective:   VS: Visit Vitals  /71 (BP 1 Location: Left upper arm, BP Patient Position: At rest)   Pulse 90   Temp 97.8 °F (36.6 °C)   Resp 18   Ht 5' 2\" (1.575 m)   Wt 63.9 kg (140 lb 12.8 oz)   SpO2 99%   Breastfeeding No   BMI 25.75 kg/m²      Tmax/24hrs: Temp (24hrs), Av.8 °F (36.6 °C), Min:97.3 °F (36.3 °C), Max:98.1 °F (36.7 °C)    Intake/Output Summary (Last 24 hours) at 2022 1424  Last data filed at 2022 0747  Gross per 24 hour   Intake 4952.42 ml   Output 600 ml   Net 4352.42 ml       General:  In NAD. Nontoxic-appearing. Cardiovascular:  RRR. Pulmonary:  Lungs clear bilaterally, no wheezes. No accessory muscle use. GI:  Abdomen soft, NTTP. No guarding or rebound reported. Extremities:  Warm, no edema or ischemia. Neuro:  Awake and alert.   Moves extremities spontaneously, motor grossly nonfocal.    Labs:    Recent Results (from the past 24 hour(s))   URINALYSIS W/ RFLX MICROSCOPIC    Collection Time: 09/21/22  4:40 PM   Result Value Ref Range    Color YELLOW      Appearance CLOUDY      Specific gravity >1.030 (H) 1.003 - 1.030    pH (UA) 5.0 5.0 - 8.0      Protein 30 (A) NEG mg/dL    Glucose >1,000 (A) NEG mg/dL    Ketone >160 (A) NEG mg/dL    Bilirubin Negative NEG      Blood Negative NEG      Urobilinogen 0.2 0.2 - 1.0 EU/dL    Nitrites Negative NEG      Leukocyte Esterase Negative NEG     COVID-19 WITH INFLUENZA A/B    Collection Time: 09/21/22  4:40 PM   Result Value Ref Range    SARS-CoV-2 by PCR Not detected NOTD      Influenza A by PCR Not detected NOTD      Influenza B by PCR Not detected NOTD     URINE MICROSCOPIC ONLY    Collection Time: 09/21/22  4:40 PM   Result Value Ref Range    WBC Negative 0 - 4 /hpf    RBC Negative 0 - 5 /hpf    Epithelial cells 3+ 0 - 5 /lpf    Bacteria 1+ (A) NEG /hpf   CBC WITH AUTOMATED DIFF    Collection Time: 09/21/22  5:00 PM   Result Value Ref Range    WBC 6.0 4.6 - 13.2 K/uL    RBC 4.89 4.20 - 5.30 M/uL    HGB 15.4 12.0 - 16.0 g/dL    HCT 46.7 (H) 35.0 - 45.0 %    MCV 95.5 78.0 - 100.0 FL    MCH 31.5 24.0 - 34.0 PG    MCHC 33.0 31.0 - 37.0 g/dL    RDW 12.8 11.6 - 14.5 %    PLATELET 530 469 - 200 K/uL    MPV 10.6 9.2 - 11.8 FL    NRBC 0.0 0  WBC    ABSOLUTE NRBC 0.00 0.00 - 0.01 K/uL    NEUTROPHILS 64 40 - 73 %    LYMPHOCYTES 21 21 - 52 %    MONOCYTES 8 3 - 10 %    EOSINOPHILS 1 0 - 5 %    BASOPHILS 2 0 - 2 %    IMMATURE GRANULOCYTES 4 (H) 0.0 - 0.5 %    ABS. NEUTROPHILS 3.8 1.8 - 8.0 K/UL    ABS. LYMPHOCYTES 1.3 0.9 - 3.6 K/UL    ABS. MONOCYTES 0.5 0.05 - 1.2 K/UL    ABS. EOSINOPHILS 0.0 0.0 - 0.4 K/UL    ABS. BASOPHILS 0.1 0.0 - 0.1 K/UL    ABS. IMM.  GRANS. 0.3 (H) 0.00 - 0.04 K/UL    DF AUTOMATED     METABOLIC PANEL, COMPREHENSIVE    Collection Time: 09/21/22  5:00 PM   Result Value Ref Range    Sodium 134 (L) 136 - 145 mmol/L Potassium 4.9 3.5 - 5.5 mmol/L    Chloride 105 100 - 111 mmol/L    CO2 11 (L) 21 - 32 mmol/L    Anion gap 18 3.0 - 18 mmol/L    Glucose 312 (H) 74 - 99 mg/dL    BUN 6 (L) 7.0 - 18 MG/DL    Creatinine 0.67 0.6 - 1.3 MG/DL    BUN/Creatinine ratio 9 (L) 12 - 20      GFR est AA >60 >60 ml/min/1.73m2    GFR est non-AA >60 >60 ml/min/1.73m2    Calcium 9.1 8.5 - 10.1 MG/DL    Bilirubin, total 0.6 0.2 - 1.0 MG/DL    ALT (SGPT) 19 13 - 56 U/L    AST (SGOT) 6 (L) 10 - 38 U/L    Alk.  phosphatase 56 45 - 117 U/L    Protein, total 7.7 6.4 - 8.2 g/dL    Albumin 4.1 3.4 - 5.0 g/dL    Globulin 3.6 2.0 - 4.0 g/dL    A-G Ratio 1.1 0.8 - 1.7     TROPONIN-HIGH SENSITIVITY    Collection Time: 09/21/22  5:00 PM   Result Value Ref Range    Troponin-High Sensitivity 4 0 - 54 ng/L   HCG QL SERUM    Collection Time: 09/21/22  5:00 PM   Result Value Ref Range    HCG, Ql. Negative NEG     PH, VENOUS    Collection Time: 09/21/22  5:00 PM   Result Value Ref Range    VENOUS PH 7.11 (LL) 7.32 - 7.42     NT-PRO BNP    Collection Time: 09/21/22  5:00 PM   Result Value Ref Range    NT pro-BNP 23 0 - 450 PG/ML   HEMOGLOBIN A1C WITH EAG    Collection Time: 09/21/22  5:00 PM   Result Value Ref Range    Hemoglobin A1c 9.9 (H) 4.2 - 5.6 %    Est. average glucose 237 mg/dL   MAGNESIUM    Collection Time: 09/21/22  5:00 PM   Result Value Ref Range    Magnesium 2.0 1.6 - 2.6 mg/dL   PHOSPHORUS    Collection Time: 09/21/22  5:00 PM   Result Value Ref Range    Phosphorus 2.6 2.5 - 4.9 MG/DL   LIPASE    Collection Time: 09/21/22  5:00 PM   Result Value Ref Range    Lipase 46 (L) 73 - 393 U/L   BLOOD GAS,CHEM8,LACTIC ACID POC    Collection Time: 09/21/22  5:13 PM   Result Value Ref Range    Calcium, ionized (POC) 1.17 1.12 - 1.32 mmol/L    Base deficit (POC) 16.8 mmol/L    HCO3 (POC) 10.2 (L) 22 - 26 MMOL/L    CO2, POC 11 (L) 19 - 24 MMOL/L    O2 SAT 30 %    Sample source VENOUS BLOOD      Performed by [facility]     Sodium (POC) 134 (L) 136 - 145 mmol/L Potassium (POC) 4.7 3.5 - 5.1 mmol/L    Glucose (POC) 325 (H) 65 - 100 mg/dL    Creatinine (POC) 0.57 (L) 0.6 - 1.3 mg/dL    Lactic Acid (POC) 0.96 0.40 - 2.00 mmol/L    Chloride (POC) 109 (H) 98 - 107 mmol/L    Anion gap, POC 16 10 - 20      GFRAA, POC >60 >60 ml/min/1.73m2    GFRNA, POC >60 >60 ml/min/1.73m2    pH, venous (POC) 7.17 (LL) 7.32 - 7.42      pCO2, venous (POC) 28.0 (L) 41 - 51 MMHG    pO2, venous (POC) 24 (L) 25 - 40 mmHg    Critical value read back MD    GLUCOSTABILIZER    Collection Time: 09/21/22  6:31 PM   Result Value Ref Range    Glucose 312 mg/dL    Insulin order 5.0 units/hour    Insulin adminstered 5.0 units/hour    Multiplier 0.020     Low target 150 mg/dL    High target 200 mg/dL    D50 order 0.0 ml    D50 administered 0.00 ml    Minutes until next BG 60 min    Order initials NH     Administered initials NH    GLUCOSE, POC    Collection Time: 09/21/22  7:35 PM   Result Value Ref Range    Glucose (POC) 225 (H) 70 - 110 mg/dL   GLUCOSTABILIZER    Collection Time: 09/21/22  7:38 PM   Result Value Ref Range    Glucose 225 mg/dL    Insulin order 5.0 units/hour    Insulin adminstered 5.0 units/hour    Multiplier 0.030     Low target 150 mg/dL    High target 200 mg/dL    D50 order 0.0 ml    D50 administered 0.00 ml    Minutes until next BG 60 min    Order initials KSC     Administered initials KCS    GLUCOSE, POC    Collection Time: 09/21/22  8:43 PM   Result Value Ref Range    Glucose (POC) 131 (H) 70 - 110 mg/dL   GLUCOSTABILIZER    Collection Time: 09/21/22  8:43 PM   Result Value Ref Range    Glucose 131 mg/dL    Insulin order 1.4 units/hour    Insulin adminstered 1.4 units/hour    Multiplier 0.020     Low target 150 mg/dL    High target 200 mg/dL    D50 order 0.0 ml    D50 administered 0.00 ml    Minutes until next BG 60 min    Order initials jg     Administered initials jg    METABOLIC PANEL, BASIC    Collection Time: 09/21/22  8:45 PM   Result Value Ref Range    Sodium 140 136 - 145 mmol/L    Potassium 3.6 3.5 - 5.5 mmol/L    Chloride 115 (H) 100 - 111 mmol/L    CO2 13 (L) 21 - 32 mmol/L    Anion gap 12 3.0 - 18 mmol/L    Glucose 145 (H) 74 - 99 mg/dL    BUN 5 (L) 7.0 - 18 MG/DL    Creatinine 0.44 (L) 0.6 - 1.3 MG/DL    BUN/Creatinine ratio 11 (L) 12 - 20      GFR est AA >60 >60 ml/min/1.73m2    GFR est non-AA >60 >60 ml/min/1.73m2    Calcium 7.2 (L) 8.5 - 10.1 MG/DL   GLUCOSE, POC    Collection Time: 09/21/22  9:53 PM   Result Value Ref Range    Glucose (POC) 174 (H) 70 - 110 mg/dL   GLUCOSTABILIZER    Collection Time: 09/21/22  9:53 PM   Result Value Ref Range    Glucose 174 mg/dL    Insulin order 2.3 units/hour    Insulin adminstered 2.3 units/hour    Multiplier 0.020     Low target 150 mg/dL    High target 200 mg/dL    D50 order 0.0 ml    D50 administered 0.00 ml    Minutes until next BG 60 min    Order initials ksc     Administered initials ksc    GLUCOSE, POC    Collection Time: 09/21/22 11:35 PM   Result Value Ref Range    Glucose (POC) 226 (H) 70 - 110 mg/dL   GLUCOSE, POC    Collection Time: 09/22/22  1:26 AM   Result Value Ref Range    Glucose (POC) 270 (H) 70 - 110 mg/dL   GLUCOSE, POC    Collection Time: 09/22/22  2:51 AM   Result Value Ref Range    Glucose (POC) 268 (H) 70 - 077 mg/dL   METABOLIC PANEL, BASIC    Collection Time: 09/22/22  3:53 AM   Result Value Ref Range    Sodium 137 136 - 145 mmol/L    Potassium 3.7 3.5 - 5.5 mmol/L    Chloride 112 (H) 100 - 111 mmol/L    CO2 11 (L) 21 - 32 mmol/L    Anion gap 14 3.0 - 18 mmol/L    Glucose 321 (H) 74 - 99 mg/dL    BUN 3 (L) 7.0 - 18 MG/DL    Creatinine 0.46 (L) 0.6 - 1.3 MG/DL    BUN/Creatinine ratio 7 (L) 12 - 20      GFR est AA >60 >60 ml/min/1.73m2    GFR est non-AA >60 >60 ml/min/1.73m2    Calcium 8.2 (L) 8.5 - 10.1 MG/DL   CBC W/O DIFF    Collection Time: 09/22/22  3:53 AM   Result Value Ref Range    WBC 5.6 4.6 - 13.2 K/uL    RBC 4.36 4.20 - 5.30 M/uL    HGB 13.6 12.0 - 16.0 g/dL    HCT 41.8 35.0 - 45.0 %    MCV 95.9 78.0 - 100.0 FL    MCH 31.2 24.0 - 34.0 PG    MCHC 32.5 31.0 - 37.0 g/dL    RDW 12.8 11.6 - 14.5 %    PLATELET 423 004 - 701 K/uL    MPV 11.0 9.2 - 11.8 FL    NRBC 0.0 0  WBC    ABSOLUTE NRBC 0.00 0.00 - 0.01 K/uL   MAGNESIUM    Collection Time: 09/22/22  3:53 AM   Result Value Ref Range    Magnesium 1.6 1.6 - 2.6 mg/dL   GLUCOSE, POC    Collection Time: 09/22/22  8:23 AM   Result Value Ref Range    Glucose (POC) 314 (H) 70 - 110 mg/dL   EKG, 12 LEAD, INITIAL    Collection Time: 09/22/22  8:43 AM   Result Value Ref Range    Ventricular Rate 79 BPM    Atrial Rate 79 BPM    P-R Interval 140 ms    QRS Duration 82 ms    Q-T Interval 388 ms    QTC Calculation (Bezet) 444 ms    Calculated P Axis 61 degrees    Calculated R Axis 74 degrees    Calculated T Axis 43 degrees    Diagnosis       Normal sinus rhythm  Normal ECG  When compared with ECG of 03-SEP-2022 07:32,  Non-specific change in ST segment in Inferior leads     MAGNESIUM    Collection Time: 09/22/22 10:23 AM   Result Value Ref Range    Magnesium 1.6 1.6 - 2.6 mg/dL   GLUCOSE, POC    Collection Time: 09/22/22 11:48 AM   Result Value Ref Range    Glucose (POC) 181 (H) 70 - 110 mg/dL       Signed By: Isaiah Dunn MD     September 22, 2022

## 2022-09-22 NOTE — PROGRESS NOTES
Problem: Diabetes Self-Management  Goal: *Disease process and treatment process  Description: Define diabetes and identify own type of diabetes; list 3 options for treating diabetes. Outcome: Progressing Towards Goal  Goal: *Incorporating nutritional management into lifestyle  Description: Describe effect of type, amount and timing of food on blood glucose; list 3 methods for planning meals. Outcome: Progressing Towards Goal  Goal: *Incorporating physical activity into lifestyle  Description: State effect of exercise on blood glucose levels. Outcome: Progressing Towards Goal  Goal: *Developing strategies to promote health/change behavior  Description: Define the ABC's of diabetes; identify appropriate screenings, schedule and personal plan for screenings. Outcome: Progressing Towards Goal  Goal: *Using medications safely  Description: State effect of diabetes medications on diabetes; name diabetes medication taking, action and side effects. Outcome: Progressing Towards Goal  Goal: *Monitoring blood glucose, interpreting and using results  Description: Identify recommended blood glucose targets  and personal targets. Outcome: Progressing Towards Goal  Goal: *Prevention, detection, treatment of acute complications  Description: List symptoms of hyper- and hypoglycemia; describe how to treat low blood sugar and actions for lowering  high blood glucose level. Outcome: Progressing Towards Goal  Goal: *Prevention, detection and treatment of chronic complications  Description: Define the natural course of diabetes and describe the relationship of blood glucose levels to long term complications of diabetes.   Outcome: Progressing Towards Goal  Goal: *Developing strategies to address psychosocial issues  Description: Describe feelings about living with diabetes; identify support needed and support network  Outcome: Progressing Towards Goal  Goal: *Insulin pump training  Outcome: Progressing Towards Goal  Goal: *Sick day guidelines  Outcome: Progressing Towards Goal  Goal: *Patient Specific Goal (EDIT GOAL, INSERT TEXT)  Outcome: Progressing Towards Goal     Problem: Falls - Risk of  Goal: *Absence of Falls  Description: Document Lindsay Fall Risk and appropriate interventions in the flowsheet.   Outcome: Progressing Towards Goal  Note: Fall Risk Interventions:            Medication Interventions: Patient to call before getting OOB, Teach patient to arise slowly

## 2022-09-22 NOTE — ROUTINE PROCESS
Bedside and Verbal shift change report given to Kavitha RN (oncoming nurse) by Solomon Monge (offgoing nurse). Report included the following information SBAR, Kardex, ED Summary, MAR, and Cardiac Rhythm SR . Patient quietly resting, call light in reach. Awake on rounds, no complaints. Call light in reach.

## 2022-09-22 NOTE — DIABETES MGMT
Diabetes Patient/Family Education Record    Factors That May Influence Patients Ability to Learn or Comply with Recommendations   []   Language barrier    []   Cultural needs   []   Motivation    []   Cognitive limitation    []   Physical   [x]   Education    []   Physiological factors   []   Hearing/vision/speaking impairment   []   Jainism beliefs    []   Financial factors   []  Other:   []  No factors identified at this time. Person Instructed:   []   Patient   []   Family   []  Other     Preference for Learning:   [x]   Verbal   []   Written   []  Demonstration     Level of Comprehension & Competence:    [x]  Good                                      [] Fair                                     []  Poor                             []  Needs Reinforcement   [x]  Teach back completed    Education Component:   [x]  Medication management, including how to administer insulin (if appropriate) and potential medication interactions: Patient reported that she's under the care of an endocrinologist at [place] [address] but has difficulty getting an appointment. She plan to return to Dr. Bakari Melendez's service as soon as possible. She has type 1 diabetes and was on humalog via insulin drip. However, her insulin pump was recalled and she just received it but still yet to get an appointment with her endocrinologist to resume it. Prior to admission, patient reported prescriptions for SC lantus insulin 50 units daily and correctional lispro. Patient reported not taking insulin regularly as prescribed. She last took lantus insulin on 9/20/2022. Educated and encouraged diabetes treatment compliance to help prevent complications of uncontrolled diabetes. She verbalized understanding.       [x]  Nutritional management - [x] Obtained usual meal pattern   [x]   Basic carbohydrate counting  []  Plate method  []  Limit concentrated sweets and avoid sweetened beverages [x]  Portion control  []    Avoid skipping meals   [x]  Exercise   [x]  Signs, symptoms, and treatment of hyperglycemia and hypoglycemia   [x] Prevention, recognition and treatment of hyperglycemia and hypoglycemia   [x]  Importance of blood glucose monitoring  [x] Blood Glucose targets   []   Provided patient with blood glucose meter  [x]  Has glucometer and supplies at home: Patient reported that she has continuous glucose monitoring but currently not using it. However, she's checking her BG via fingerstick glucose meter at this time. Patient did not give explanation as to why she's not using her CGM. []  Instruction on use of the blood glucose meter and recommended monitoring schedule   [x]  Discuss the importance of HbA1C monitoring. Patient's A1c is 9.9% (9/21/2022). This is equivalent to average glucose of 237 mg/dl for the past 2-3 months.   []  Sick day guidelines   []  Proper use and disposal of lancets, needles, syringes or insulin pens (if appropriate)   []  Potential long-term complications (retinopathy, kidney disease, neuropathy, foot care)   [x] Information about whom to contact in case of emergency or for more information    [x]  Goal:  Patient/family will demonstrate understanding of Diabetes Self- Management Skills by: 9/27/2022  Plan for post-discharge education or self-management support:    [x] Outpatient class schedule provided            [] Patient Declined    [] Scheduled for outpatient classes (date) _______    [] Written information provided  Verify: [x] Prior to admission Diabetes medications    Does patient understand how diabetes medications work? Yes. Does patient have difficulty obtaining diabetes medications or testing supplies?  Issac Hope RN [facility]  Office: [phone]

## 2022-09-22 NOTE — ED NOTES
TRANSFER - OUT REPORT:    Verbal report given to [employer] (name) on Angelique Lynch  being transferred to [address] [place] [address] ([facility]) for routine progression of care       Report consisted of patients Situation, Background, Assessment and   Recommendations(SBAR). Information from the following report(s) SBAR, ED Summary, MAR, Recent Results, and Cardiac Rhythm NSR  was reviewed with the receiving nurse. Lines:   Peripheral IV 09/21/22 Anterior;Proximal;Right Forearm (Active)       Peripheral IV 09/21/22 (Active)   Site Assessment Clean, dry, & intact 09/21/22 1851   Phlebitis Assessment 0 09/21/22 1851   Infiltration Assessment 0 09/21/22 1851   Dressing Status Clean, dry, & intact 09/21/22 1851        Opportunity for questions and clarification was provided.       Patient transported with:  IVF, pump

## 2022-09-22 NOTE — DIABETES MGMT
Diabetes/ Glycemic Control Plan of Care    Recommendations:   1.) modify correctional lispro insulin to very resistant dose per protocol. Done. 2.) add basal lantus insulin 25 units daily starting now, 9/22/2022. Order obtained. Follow-up BG checks later today to determine need for lantus insulin dose adjustment if BG remain above target. Also obtained order to change diet from full liquid to regular with 3 carb options. Assessment: Patient with history of T1DM presented to ED on 9/21/2022 with report of nausea, vomiting, abdominal pain, and fatigue. She was placed on regular insulin drip via GlucoStablizer for three hours. Did not see basal lantus insulin given prior to d/c insulin drip. Her BG values are elevated this morning and patient reported that she last took lantus insulin 50 units on 9/20/2022. Completed assessment of home diabetes management and education, 9/22/2022. POC BG 9/21/2022: 225, 131, 174, 226  POC BG 9/22/12022: 270, 268, 314    DX:   1. Type 1 diabetes mellitus with ketoacidosis without coma (HCC)           Fasting/ Morning blood glucose:   Lab Results   Component Value Date/Time    Glucose 321 (H) 09/22/2022 03:53 AM    Glucose (POC) 314 (H) 09/22/2022 08:23 AM    Glucose (POC) 168 (H) 03/25/2017 11:07 AM     IV Fluids containing dextrose: none    Steroids:  None    Blood glucose values: Within target range (70-180mg/dL):  NO    Current insulin orders:   Basal lantus insulin 25 units daily, first dose ordered 9/22/2022  Correctional lispro insulin.  Modified to very resistant dose    Total Daily Dose previous 24 hours = 8.7 units regular insulin drip via GlucoStabilizer    Current A1c:   Lab Results   Component Value Date/Time    Hemoglobin A1c 9.9 (H) 09/21/2022 05:00 PM    Hemoglobin A1c, External 9.0 07/12/2021 12:00 AM      equivalent  to ave Blood Glucose of 237 mg/dl for 2-3 months prior to admission    Adequate glycemic control PTA: NO    Nutrition/Diet:   Active Orders   Diet    ADULT DIET Regular; 3 carb choices (45 gm/meal)      Meal Intake:  No data found. Supplement Intake:  No data found. Home diabetes medications:  Patient reported that she's under the care of an endocrinologist at [place] [address] but has difficulty getting an appointment. She plan to return to Dr. Wendi Melendez's service as soon as possible. She has type 1 diabetes and was on humalog via insulin drip. However, her insulin pump was recalled and she just received it but still yet to get an appointment with her endocrinologist to resume it. Prior to admission, patient reported prescriptions for SC lantus insulin 50 units daily and correctional lispro. Patient reported not taking insulin regularly as prescribed. She last took lantus insulin on 9/20/2022. Key Antihyperglycemic Medications               insulin glargine (LANTUS) 100 unit/mL injection (Taking) 50 Units by SubCUTAneous route nightly. HumaLOG KwikPen Insulin 100 unit/mL kwikpen (Taking) INJECT AS INSTRUCTED (MAX OF 50 UNITS PER DAY)            Plan/Goals:   Blood glucose will be within target of 70 - 180 mg/dl within 72 hours  Reinforce dietary and medication compliance at home.             Education:  [x] Refer to Diabetes Education Record:                        [] Education not indicated at this time     Asad Briceño RN [facility]  Office: [phone]

## 2022-09-22 NOTE — ROUTINE PROCESS
TRANSFER - IN REPORT:    Telephone report received from Cherry Shaikh RN(name) on Jori Gregorio  being received from ED(unit) for routine progression of care      Report consisted of patients Situation, Background, Assessment and   Recommendations(SBAR). Information from the following report(s) SBAR, Kardex, MAR, Recent Results, and Cardiac Rhythm SR  was reviewed with the receiving nurse. Opportunity for questions and clarification was provided. 9048 Assessment completed upon patients arrival to unit and care assumed. Patient requested accuceck be done, 268. Patient anxious and very talkative, states her \"blood sugars\" go up fast. Patient requested full liq snacks, provided, no nausea noted.

## 2022-09-23 VITALS
HEIGHT: 62 IN | HEART RATE: 79 BPM | BODY MASS INDEX: 25.91 KG/M2 | SYSTOLIC BLOOD PRESSURE: 141 MMHG | DIASTOLIC BLOOD PRESSURE: 92 MMHG | WEIGHT: 140.8 LBS | RESPIRATION RATE: 18 BRPM | TEMPERATURE: 98.2 F | OXYGEN SATURATION: 97 %

## 2022-09-23 LAB
ANION GAP SERPL CALC-SCNC: 5 MMOL/L (ref 3–18)
ANION GAP SERPL CALC-SCNC: 6 MMOL/L (ref 3–18)
ANION GAP SERPL CALC-SCNC: 6 MMOL/L (ref 3–18)
ATRIAL RATE: 79 BPM
BUN SERPL-MCNC: 7 MG/DL (ref 7–18)
BUN SERPL-MCNC: 8 MG/DL (ref 7–18)
BUN SERPL-MCNC: 8 MG/DL (ref 7–18)
BUN/CREAT SERPL: 18 (ref 12–20)
CALCIUM SERPL-MCNC: 8.2 MG/DL (ref 8.5–10.1)
CALCIUM SERPL-MCNC: 8.4 MG/DL (ref 8.5–10.1)
CALCIUM SERPL-MCNC: 8.9 MG/DL (ref 8.5–10.1)
CALCULATED P AXIS, ECG09: 61 DEGREES
CALCULATED R AXIS, ECG10: 74 DEGREES
CALCULATED T AXIS, ECG11: 43 DEGREES
CHLORIDE SERPL-SCNC: 105 MMOL/L (ref 100–111)
CHLORIDE SERPL-SCNC: 110 MMOL/L (ref 100–111)
CHLORIDE SERPL-SCNC: 111 MMOL/L (ref 100–111)
CO2 SERPL-SCNC: 24 MMOL/L (ref 21–32)
CO2 SERPL-SCNC: 25 MMOL/L (ref 21–32)
CO2 SERPL-SCNC: 30 MMOL/L (ref 21–32)
CREAT SERPL-MCNC: 0.4 MG/DL (ref 0.6–1.3)
CREAT SERPL-MCNC: 0.45 MG/DL (ref 0.6–1.3)
CREAT SERPL-MCNC: 0.45 MG/DL (ref 0.6–1.3)
DIAGNOSIS, 93000: NORMAL
ERYTHROCYTE [DISTWIDTH] IN BLOOD BY AUTOMATED COUNT: 12.8 % (ref 11.6–14.5)
GLUCOSE BLD STRIP.AUTO-MCNC: 216 MG/DL (ref 70–110)
GLUCOSE BLD STRIP.AUTO-MCNC: 242 MG/DL (ref 70–110)
GLUCOSE BLD STRIP.AUTO-MCNC: 243 MG/DL (ref 70–110)
GLUCOSE SERPL-MCNC: 236 MG/DL (ref 74–99)
GLUCOSE SERPL-MCNC: 310 MG/DL (ref 74–99)
GLUCOSE SERPL-MCNC: 343 MG/DL (ref 74–99)
HCT VFR BLD AUTO: 37.3 % (ref 35–45)
HGB BLD-MCNC: 13 G/DL (ref 12–16)
MAGNESIUM SERPL-MCNC: 1.6 MG/DL (ref 1.6–2.6)
MAGNESIUM SERPL-MCNC: 1.7 MG/DL (ref 1.6–2.6)
MAGNESIUM SERPL-MCNC: 1.8 MG/DL (ref 1.6–2.6)
MAGNESIUM SERPL-MCNC: 1.8 MG/DL (ref 1.6–2.6)
MAGNESIUM SERPL-MCNC: 1.9 MG/DL (ref 1.6–2.6)
MCH RBC QN AUTO: 31.8 PG (ref 24–34)
MCHC RBC AUTO-ENTMCNC: 34.9 G/DL (ref 31–37)
MCV RBC AUTO: 91.2 FL (ref 78–100)
NRBC # BLD: 0 K/UL (ref 0–0.01)
NRBC BLD-RTO: 0 PER 100 WBC
P-R INTERVAL, ECG05: 140 MS
PLATELET # BLD AUTO: 204 K/UL (ref 135–420)
PMV BLD AUTO: 11 FL (ref 9.2–11.8)
POTASSIUM SERPL-SCNC: 3 MMOL/L (ref 3.5–5.5)
POTASSIUM SERPL-SCNC: 3.1 MMOL/L (ref 3.5–5.5)
POTASSIUM SERPL-SCNC: 3.2 MMOL/L (ref 3.5–5.5)
Q-T INTERVAL, ECG07: 388 MS
QRS DURATION, ECG06: 82 MS
QTC CALCULATION (BEZET), ECG08: 444 MS
RBC # BLD AUTO: 4.09 M/UL (ref 4.2–5.3)
SODIUM SERPL-SCNC: 140 MMOL/L (ref 136–145)
SODIUM SERPL-SCNC: 141 MMOL/L (ref 136–145)
SODIUM SERPL-SCNC: 141 MMOL/L (ref 136–145)
VENTRICULAR RATE, ECG03: 79 BPM
WBC # BLD AUTO: 5.7 K/UL (ref 4.6–13.2)

## 2022-09-23 PROCEDURE — 83735 ASSAY OF MAGNESIUM: CPT

## 2022-09-23 PROCEDURE — 74011250637 HC RX REV CODE- 250/637: Performed by: FAMILY MEDICINE

## 2022-09-23 PROCEDURE — 36415 COLL VENOUS BLD VENIPUNCTURE: CPT

## 2022-09-23 PROCEDURE — 74011636637 HC RX REV CODE- 636/637: Performed by: FAMILY MEDICINE

## 2022-09-23 PROCEDURE — 99238 HOSP IP/OBS DSCHRG MGMT 30/<: CPT | Performed by: FAMILY MEDICINE

## 2022-09-23 PROCEDURE — 82962 GLUCOSE BLOOD TEST: CPT

## 2022-09-23 PROCEDURE — 80048 BASIC METABOLIC PNL TOTAL CA: CPT

## 2022-09-23 PROCEDURE — 74011000250 HC RX REV CODE- 250: Performed by: INTERNAL MEDICINE

## 2022-09-23 PROCEDURE — 74011250637 HC RX REV CODE- 250/637: Performed by: INTERNAL MEDICINE

## 2022-09-23 PROCEDURE — 85027 COMPLETE CBC AUTOMATED: CPT

## 2022-09-23 RX ORDER — INSULIN LISPRO 100 [IU]/ML
INJECTION, SOLUTION INTRAVENOUS; SUBCUTANEOUS
Qty: 2 EACH | Refills: 3 | Status: SHIPPED | OUTPATIENT
Start: 2022-09-23

## 2022-09-23 RX ORDER — INSULIN GLARGINE 100 [IU]/ML
35 INJECTION, SOLUTION SUBCUTANEOUS DAILY
Status: DISCONTINUED | OUTPATIENT
Start: 2022-09-24 | End: 2022-09-23 | Stop reason: HOSPADM

## 2022-09-23 RX ORDER — HYDROCODONE BITARTRATE AND ACETAMINOPHEN 5; 325 MG/1; MG/1
1 TABLET ORAL
Qty: 12 TABLET | Refills: 0 | Status: SHIPPED | OUTPATIENT
Start: 2022-09-23 | End: 2022-09-26

## 2022-09-23 RX ORDER — POTASSIUM CHLORIDE 20 MEQ/1
40 TABLET, EXTENDED RELEASE ORAL EVERY 4 HOURS
Status: DISCONTINUED | OUTPATIENT
Start: 2022-09-23 | End: 2022-09-23 | Stop reason: HOSPADM

## 2022-09-23 RX ORDER — INSULIN GLARGINE 100 [IU]/ML
10 INJECTION, SOLUTION SUBCUTANEOUS
Status: COMPLETED | OUTPATIENT
Start: 2022-09-23 | End: 2022-09-23

## 2022-09-23 RX ORDER — OMEPRAZOLE 20 MG/1
20 CAPSULE, DELAYED RELEASE ORAL
Qty: 90 CAPSULE | Refills: 0 | Status: SHIPPED | OUTPATIENT
Start: 2022-09-23

## 2022-09-23 RX ORDER — INSULIN GLARGINE 100 [IU]/ML
35 INJECTION, SOLUTION SUBCUTANEOUS
Qty: 4 ML | Refills: 2 | Status: SHIPPED | OUTPATIENT
Start: 2022-09-23

## 2022-09-23 RX ORDER — PEN NEEDLE, DIABETIC 30 GX3/16"
NEEDLE, DISPOSABLE MISCELLANEOUS
Qty: 1 EACH | Refills: 11 | Status: SHIPPED | OUTPATIENT
Start: 2022-09-23

## 2022-09-23 RX ADMIN — POTASSIUM CHLORIDE 40 MEQ: 1500 TABLET, EXTENDED RELEASE ORAL at 14:00

## 2022-09-23 RX ADMIN — Medication 10 UNITS: at 11:31

## 2022-09-23 RX ADMIN — POTASSIUM BICARBONATE 40 MEQ: 782 TABLET, EFFERVESCENT ORAL at 09:30

## 2022-09-23 RX ADMIN — SUCRALFATE 1 G: 1 TABLET ORAL at 06:43

## 2022-09-23 RX ADMIN — SODIUM CHLORIDE, PRESERVATIVE FREE 40 ML: 5 INJECTION INTRAVENOUS at 00:23

## 2022-09-23 RX ADMIN — HYDROCODONE BITARTRATE AND ACETAMINOPHEN 2 TABLET: 5; 325 TABLET ORAL at 17:33

## 2022-09-23 RX ADMIN — SUCRALFATE 1 G: 1 TABLET ORAL at 11:31

## 2022-09-23 RX ADMIN — FAMOTIDINE 20 MG: 20 TABLET ORAL at 08:41

## 2022-09-23 RX ADMIN — SUCRALFATE 1 G: 1 TABLET ORAL at 17:25

## 2022-09-23 RX ADMIN — Medication 25 UNITS: at 08:40

## 2022-09-23 RX ADMIN — FAMOTIDINE 20 MG: 20 TABLET ORAL at 17:25

## 2022-09-23 RX ADMIN — Medication 6 UNITS: at 08:40

## 2022-09-23 RX ADMIN — HYDROCODONE BITARTRATE AND ACETAMINOPHEN 2 TABLET: 5; 325 TABLET ORAL at 11:31

## 2022-09-23 RX ADMIN — SODIUM CHLORIDE, PRESERVATIVE FREE 10 ML: 5 INJECTION INTRAVENOUS at 14:01

## 2022-09-23 RX ADMIN — Medication 6 UNITS: at 17:25

## 2022-09-23 RX ADMIN — HYDROCODONE BITARTRATE AND ACETAMINOPHEN 2 TABLET: 5; 325 TABLET ORAL at 05:45

## 2022-09-23 RX ADMIN — Medication 6 UNITS: at 11:31

## 2022-09-23 RX ADMIN — POTASSIUM CHLORIDE 40 MEQ: 1500 TABLET, EXTENDED RELEASE ORAL at 17:25

## 2022-09-23 NOTE — DIABETES MGMT
Diabetes/ Glycemic Control Plan of Care    Recommendations: lantus 10 units now and increase daily lantus dose to 35 units     Assessment:   She reports episode of vomiting this morning, bilious - MD made aware  Elevated FBG - basal increased. She required 32 units of corrective humalog yesterday  She is followed by DR SHAHIDA LONG [facility] but has had difficulty making an appointment. She is currently using basal/bolus insulin r/t recall with her [employer] insulin pump. She also uses a CGM but is out of sensors. She has seen Dr. Rola Pace in the past and she will try to secure an appointment with his office. Will continue to monitor and follow for discharge needs     1. Type 1 diabetes mellitus with ketoacidosis without coma (HCC)       IV Fluids containing dextrose: none  Steroids: none     Blood glucose values: Within target range (70-180mg/dL):  no    Current insulin orders:   Lantus 35 units daily  Corrective humalog, very insulin resistant, 4 times daily  Total Daily Dose previous 24 hours = 57 units   25 units lantus  32 units humalog    Current A1c:   Lab Results   Component Value Date/Time    Hemoglobin A1c 9.9 (H) 09/21/2022 05:00 PM    Hemoglobin A1c, External 9.0 07/12/2021 12:00 AM      equivalent  to ave Blood Glucose of 237 mg/dl for 2-3 months prior to admission  Adequate glycemic control PTA: no    Nutrition/Diet:   Active Orders   Diet    ADULT DIET Regular; 3 carb choices (45 gm/meal); No Concentrated Sweets      Home diabetes medications:    insulin glargine (LANTUS) 100 unit/mL injection (Taking) 50 Units by SubCUTAneous route nightly. HumaLOG KwikPen Insulin 100 unit/mL kwikpen (Taking) INJECT AS INSTRUCTED (MAX OF 50 UNITS PER DAY)     Plan/Goals:   Blood glucose will be within target of 70 - 180 mg/dl within 72 hours  Reinforce dietary and medication compliance at home.           Education:  [x] Refer to Diabetes Education Record                       [] Education not indicated at this time     Jose Lewis MPH RN [facility] [phone]

## 2022-09-23 NOTE — PROGRESS NOTES
Discharge/Transition Planning       Plan Home and outpt follow up      Care Management Interventions  PCP Verified by CM:  Yes  Mode of Transport at Discharge: Self  Transition of Care Consult (CM Consult): Discharge Planning  Support Systems: Parent(s)  Confirm Follow Up Transport: Self  The Plan for Transition of Care is Related to the Following Treatment Goals : home  Discharge Location  Patient Expects to be Discharged to[de-identified] Home

## 2022-09-23 NOTE — PROGRESS NOTES
9/23/2022      RE: Heraclio Garza      To Whom it May Concern: This is to certify that Heraclio Garza has been hospitalized and under my care from 9/21/2022 through 9/22/2022. She may return to work on 9/28/2022 without restriction. Please feel free to contact my office if you have any questions or concerns. Thank you for your assistance in this matter.     Sincerely,          Dze Thorne MD  [facility] [place] [facility]  [phone]

## 2022-09-23 NOTE — PROGRESS NOTES
Patients B/S at 2204 was 381. Gave 15 units of Humalog as directed. Rechecked B/S 1 hour later and it was 240.

## 2022-09-26 ENCOUNTER — PATIENT OUTREACH (OUTPATIENT)
Dept: CASE MANAGEMENT | Age: 32
End: 2022-09-26

## 2022-09-26 NOTE — PROGRESS NOTES
Care Transitions Initial Call    Call within 2 business days of discharge: Yes     Patient: Erin Boyle Patient : 1990 MRN: [id number]    Last Discharge [address]       Date Complaint Diagnosis Description Type Department Provider    22 High Blood Sugar Type 1 diabetes mellitus with ketoacidosis without coma ([facility] [address] . .. ED to Hosp-Admission (Discharged) (ADMIT) Keara Varela MD; Stephanie Martin,... Was this an external facility discharge? No Discharge Facility: [facility] 2022 to 2022. CTN reviewed patient chart and then attempted to contact patient for hospital follow up. She was unavailable at the time of the call. Message left for patient introducing myself, explaining the reason for my call and giving my contact information. Requested that patient return my call at her earliest convenience. Will follow.

## 2022-09-26 NOTE — DIABETES MGMT
Diabetes/ Glycemic Control Plan of Care    Post-discharge follow up call -  Left HIPPA compliant message with my return number.       Shilpi Mullins MPH RN 1 [facility] [phone]

## 2022-09-27 ENCOUNTER — PATIENT OUTREACH (OUTPATIENT)
Dept: CASE MANAGEMENT | Age: 32
End: 2022-09-27

## 2022-09-27 NOTE — PROGRESS NOTES
Care Transitions Initial Call    Call within 2 business days of discharge: Yes     Patient: Kathie Antony Patient : 1990 MRN: [id number]    Last Discharge [address]       Date Complaint Diagnosis Description Type Department Provider    22 High Blood Sugar Type 1 diabetes mellitus with ketoacidosis without coma ([facility] [address] . .. ED to Hosp-Admission (Discharged) (ADMIT) Cassy Garcia MD; Krupa Leon,... Was this an external facility discharge? No Discharge Facility: [facility] 2022 to 2022. CTN reviewed patient chart and then attempted to contact patient for hospital follow up. She was unavailable at the time of the call. Message left for patient introducing myself, explaining the reason for my call and giving my contact information. Requested that patient return my call at her earliest convenience. This is second attempt to contact patient without success. Will follow.

## 2022-09-28 NOTE — PROGRESS NOTES
Physician Progress Note      PATIENT:               Sandra Duckworth  CSN #:                  [id number]  :                       1990  ADMIT DATE:       2022 3:22 PM  [address] DATE:        2022 5:43 PM  RESPONDING  PROVIDER #:        Roly Verma MD          QUERY TEXT:    Patient presented with present with nausea, vomiting and abdominal pain, noted to have  hyponatremia, pseudo hyponatremia. If possible, please document in progress notes and discharge summary if you are evaluating and/or treating any of the following: The medical record reflects the following:    Risk Factors: DKA, DM l    Clinical Indicators:  > On admission sodium level is 134 and treated with NS sodium chloride IV. Na+ returned to normal limits with NS. Treatment: Received IV NS    Claudette Greenspan, RN, BSN, Ori Olivera / Ana Rosa Tma, [facility] Promise [email or web address]  Options provided:  -- Hyponatremia  -- Pseudohyponatremia  -- Hyponatremia and psuedohyponatremia  -- Other - I will add my own diagnosis  -- Disagree - Not applicable / Not valid  -- Disagree - Clinically unable to determine / Unknown  -- Refer to Clinical Documentation Reviewer    PROVIDER RESPONSE TEXT:    This patient has pseudohyponatremia .     Query created by: Lisette Maharaj on 2022 11:30 AM      Electronically signed by:  Roly Verma MD 2022 8:53 AM

## 2022-10-04 ENCOUNTER — PATIENT OUTREACH (OUTPATIENT)
Dept: CASE MANAGEMENT | Age: 32
End: 2022-10-04

## 2022-10-04 NOTE — PROGRESS NOTES
Care Transitions Initial Call    Call within 2 business days of discharge: Yes     Patient: Melissa Kennedy Patient : 1990 MRN: [id number]    Last Discharge [address]       Date Complaint Diagnosis Description Type Department Provider    22 High Blood Sugar Type 1 diabetes mellitus with ketoacidosis without coma ([facility] [address] . .. ED to Hosp-Admission (Discharged) (ADMIT) Varinder Palmer MD; Sami Bravo,... Was this an external facility discharge? No Discharge Facility: [facility] 2022 to 2022. CTN reviewed patient chart and then attempted to contact patient for hospital follow up. She was unavailable at the time of the call. Message left for patient introducing myself, explaining the reason for my call and giving my contact information. Requested that patient return my call at her earliest convenience. This is the third attempt to contact patient without success. Will resolve.

## 2022-10-09 NOTE — DISCHARGE SUMMARY
Discharge Summary    Patient: Heike Banuelos MRN: [id number]  CSN: [id number]    YOB: 1990  Age: 33 y.o. Sex: female    DOA: 9/21/2022 LOS:  LOS: 2 days   Discharge Date: 9/23/2022     Admission Diagnoses: DKA (diabetic ketoacidosis) ([facility] [address] [E11.10]    Discharge Diagnoses:    Type I DM, A1c 9.9%  DKA  Dehydration  ADHD    Discharge Condition: Stable    PHYSICAL EXAM  Visit Vitals  BP (!) 141/92 (BP 1 Location: Right upper arm, BP Patient Position: Lying)   Pulse 79   Temp 98.2 °F (36.8 °C)   Resp 18   Ht 5' 2\" (1.575 m)   Wt 63.9 kg (140 lb 12.8 oz)   SpO2 97%   Breastfeeding No   BMI 25.75 kg/m²       General: In NAD. Nontoxic-appearing. HEENT: NCAT. Sclerae anicteric, EOMI. OP clear. Lungs:  Clear, no wheezes. No accessory muscle use. Heart:  RRR. Abdomen: Soft, NT/ND. Extremities: Warm, no edema or ischemia. Psych:   Mood normal.  Neurologic:  Awake and alert, moves extremities spontaneously. Motor grossly nonfocal.    Hospital Course:   See admission H&P for full details of HPI. Patient was admitted to the hospital after presenting to the emergency department in [facility]. Usual treatment with IV insulin and fluids was initiated. Patient has continued to improve with treatment and is feeling significantly better overall. She is tolerating a regular diet without difficulty. There has been no evidence for any active/acute infection. Patient is found to be medically stable for discharge home with outpatient follow-up as advised. Consults:   None    Significant Diagnostic Studies/Procedures:   Right upper quadrant ultrasound:  COMPARISON: Reference CT abdomen/pelvis 9/3/2022     FINDINGS:     Liver: The liver demonstrates normal echogenicity and echotexture. No focal  lesion appreciated. The portal vein demonstrates normal color and Spectral  Doppler with hepatopedal flow. Biliary: No intrahepatic biliary ductal dilatation appreciated.  The visualized  CBD measures up to 3.6 mm diameter, within normal limits. Gallbladder: No gallstones or sludge are appreciated. There is a negative  sonographic Black's sign, per sonographer. No gallbladder wall thickening or  pericholecystic fluid is appreciated. Pancreas: The pancreas was unable to be visualized due to shadowing from  overlying bowel gas. Right Kidney: Partial obscuration of the lower pole the right kidney due to  shadowing from bowel gas. The right kidney measures within normal limits in  length. Echogenicity and echotexture are within normal limits. There is no  evidence of hydronephrosis. No renal calculus is detected. No renal mass is  identified. Visualized Aorta/IVC: No abnormality detected. Other: None     IMPRESSION  No acute sonographic abnormality detected. Exam limitations as above. Nonvisualization of pancreas. Partial obscuration of  the right kidney. CXR:  COMPARISON: 9/3/2022     FINDINGS: Single frontal view chest. Mediastinal silhouette and pulmonary  vasculature unremarkable. No consolidation. No evidence of pneumothorax. No  acute osseous findings. IMPRESSION     No acute findings. Discharge Medications:     Discharge Medication List as of 9/23/2022  4:16 PM        START taking these medications    Details   Insulin Needles, Disposable, 31 gauge x 5/16\" ndle Diabetic supplies:  Use as directed for blood glucose management. , Print, Disp-1 Each, R-11      HYDROcodone-acetaminophen (NORCO) 5-325 mg per tablet Take 1 Tablet by mouth every six (6) hours as needed for Pain for up to 3 days.  Max Daily Amount: 4 Tablets., Normal, Disp-12 Tablet, R-0           CONTINUE these medications which have CHANGED    Details   HumaLOG KwikPen Insulin 100 unit/mL kwikpen INJECT AS INSTRUCTED (MAX OF 50 UNITS PER DAY), Normal, Disp-2 Each, R-3, BOBBI      insulin glargine (LANTUS) 100 unit/mL injection 35 Units by SubCUTAneous route nightly., Normal, Disp-4 mL, R-2      omeprazole (PRILOSEC) 20 mg capsule Take 1 Capsule by mouth Daily (before breakfast). , Normal, Disp-90 Capsule, R-0           CONTINUE these medications which have NOT CHANGED    Details   dextroamphetamine-amphetamine (ADDERALL) 20 mg tablet Take 1 Tablet by mouth two (2) times a day. Max Daily Amount: 40 mg., Normal, Disp-60 Tablet, R-0      dextroamphetamine-amphetamine (ADDERALL) 5 mg tablet Take 1 Tablet by mouth two (2) times a day. Max Daily Amount: 10 mg., Normal, Disp-60 Tablet, R-0               Activity: As tolerated. Diet: Diabetic. Disposition: Home. Follow-up: with PCP in 1 week. Case has been discussed with endocrinology, Dr. Idalia Pro, prior to discharge. He we will be happy to see patient in follow-up. Kassie Delarosa MD  [address] [place], [place] [address]ists    Disclaimer: Sections of this note are dictated using utilizing voice recognition software. Minor typographical errors may be present. If questions arise, please do not hesitate to contact me or call our department.

## 2022-10-10 DIAGNOSIS — F90.9 ATTENTION DEFICIT HYPERACTIVITY DISORDER (ADHD), UNSPECIFIED ADHD TYPE: ICD-10-CM

## 2022-10-11 NOTE — TELEPHONE ENCOUNTER
[facility] date for Adderall 20 mg & 5 mg as 9/6/22 for a 30 d/s. Last Visit: 5/10/22 with NP Casitllo Alexandra  Next Appointment: no show 10/7/22  Previous Refill Encounter(s): 9/5/22     Requested Prescriptions     Pending Prescriptions Disp Refills    dextroamphetamine-amphetamine (ADDERALL) 20 mg tablet 60 Tablet 0     Sig: Take 1 Tablet by mouth two (2) times a day. Max Daily Amount: 40 mg.    dextroamphetamine-amphetamine (ADDERALL) 5 mg tablet 60 Tablet 0     Sig: Take 1 Tablet by mouth two (2) times a day. Max Daily Amount: 10 mg. For [facility] in place:   Recommendation Provided To:    Intervention Detail: New Rx: 2, reason: Patient Preference and Scheduled Appointment  Gap Closed?:   Intervention Accepted By:   Time Spent (min): 5

## 2022-10-25 RX ORDER — DEXTROAMPHETAMINE SACCHARATE, AMPHETAMINE ASPARTATE, DEXTROAMPHETAMINE SULFATE AND AMPHETAMINE SULFATE 5; 5; 5; 5 MG/1; MG/1; MG/1; MG/1
20 TABLET ORAL 2 TIMES DAILY
Qty: 60 TABLET | Refills: 0 | Status: SHIPPED | OUTPATIENT
Start: 2022-10-25 | End: 2022-12-01 | Stop reason: SDUPTHER

## 2022-10-25 RX ORDER — DEXTROAMPHETAMINE SACCHARATE, AMPHETAMINE ASPARTATE, DEXTROAMPHETAMINE SULFATE AND AMPHETAMINE SULFATE 1.25; 1.25; 1.25; 1.25 MG/1; MG/1; MG/1; MG/1
5 TABLET ORAL 2 TIMES DAILY
Qty: 60 TABLET | Refills: 0 | Status: SHIPPED | OUTPATIENT
Start: 2022-10-25 | End: 2022-12-01 | Stop reason: SDUPTHER

## 2022-11-23 DIAGNOSIS — F90.9 ATTENTION DEFICIT HYPERACTIVITY DISORDER (ADHD), UNSPECIFIED ADHD TYPE: ICD-10-CM

## 2022-11-23 NOTE — TELEPHONE ENCOUNTER
Last Appointment 5/10/2022  Next Appointment 11/30/2022  Controlled Substance agreement signed 8/31/2022        Requested Prescriptions     Pending Prescriptions Disp Refills    dextroamphetamine-amphetamine (ADDERALL) 5 mg tablet 60 Tablet 0     Sig: Take 1 Tablet by mouth two (2) times a day. Max Daily Amount: 10 mg.    dextroamphetamine-amphetamine (ADDERALL) 20 mg tablet 60 Tablet 0     Sig: Take 1 Tablet by mouth two (2) times a day. Max Daily Amount: 40 mg.

## 2022-11-29 RX ORDER — DEXTROAMPHETAMINE SACCHARATE, AMPHETAMINE ASPARTATE, DEXTROAMPHETAMINE SULFATE AND AMPHETAMINE SULFATE 1.25; 1.25; 1.25; 1.25 MG/1; MG/1; MG/1; MG/1
5 TABLET ORAL 2 TIMES DAILY
Qty: 60 TABLET | Refills: 0 | OUTPATIENT
Start: 2022-11-29

## 2022-11-29 RX ORDER — DEXTROAMPHETAMINE SACCHARATE, AMPHETAMINE ASPARTATE, DEXTROAMPHETAMINE SULFATE AND AMPHETAMINE SULFATE 5; 5; 5; 5 MG/1; MG/1; MG/1; MG/1
20 TABLET ORAL 2 TIMES DAILY
Qty: 60 TABLET | Refills: 0 | OUTPATIENT
Start: 2022-11-29

## 2022-12-01 ENCOUNTER — HOSPITAL ENCOUNTER (OUTPATIENT)
Dept: LAB | Age: 32
Discharge: HOME | End: 2022-12-01

## 2022-12-01 ENCOUNTER — OFFICE VISIT (OUTPATIENT)
Dept: FAMILY MEDICINE CLINIC | Age: 32
End: 2022-12-01
Payer: MEDICAID

## 2022-12-01 VITALS
BODY MASS INDEX: 25.95 KG/M2 | SYSTOLIC BLOOD PRESSURE: 125 MMHG | HEART RATE: 100 BPM | HEIGHT: 62 IN | RESPIRATION RATE: 20 BRPM | DIASTOLIC BLOOD PRESSURE: 68 MMHG | WEIGHT: 141 LBS | TEMPERATURE: 98.4 F | OXYGEN SATURATION: 98 %

## 2022-12-01 DIAGNOSIS — K21.9 GASTROESOPHAGEAL REFLUX DISEASE, UNSPECIFIED WHETHER ESOPHAGITIS PRESENT: ICD-10-CM

## 2022-12-01 DIAGNOSIS — F90.9 ATTENTION DEFICIT HYPERACTIVITY DISORDER (ADHD), UNSPECIFIED ADHD TYPE: Primary | ICD-10-CM

## 2022-12-01 DIAGNOSIS — Z79.899 ENCOUNTER FOR LONG-TERM (CURRENT) USE OF HIGH-RISK MEDICATION: ICD-10-CM

## 2022-12-01 DIAGNOSIS — E10.65 TYPE 1 DIABETES MELLITUS WITH HYPERGLYCEMIA (HCC): ICD-10-CM

## 2022-12-01 PROCEDURE — 3046F HEMOGLOBIN A1C LEVEL >9.0%: CPT | Performed by: NURSE PRACTITIONER

## 2022-12-01 PROCEDURE — 80307 DRUG TEST PRSMV CHEM ANLYZR: CPT

## 2022-12-01 PROCEDURE — 99214 OFFICE O/P EST MOD 30 MIN: CPT | Performed by: NURSE PRACTITIONER

## 2022-12-01 RX ORDER — INSULIN GLARGINE 100 [IU]/ML
50 INJECTION, SOLUTION SUBCUTANEOUS DAILY
Qty: 10 ML | Refills: 2 | Status: SHIPPED | OUTPATIENT
Start: 2022-12-01

## 2022-12-01 RX ORDER — DEXTROAMPHETAMINE SACCHARATE, AMPHETAMINE ASPARTATE, DEXTROAMPHETAMINE SULFATE AND AMPHETAMINE SULFATE 1.25; 1.25; 1.25; 1.25 MG/1; MG/1; MG/1; MG/1
5 TABLET ORAL 2 TIMES DAILY
Qty: 60 TABLET | Refills: 0 | Status: SHIPPED | OUTPATIENT
Start: 2022-12-01 | End: 2022-12-09 | Stop reason: SDUPTHER

## 2022-12-01 RX ORDER — DEXTROAMPHETAMINE SACCHARATE, AMPHETAMINE ASPARTATE, DEXTROAMPHETAMINE SULFATE AND AMPHETAMINE SULFATE 5; 5; 5; 5 MG/1; MG/1; MG/1; MG/1
20 TABLET ORAL 2 TIMES DAILY
Qty: 60 TABLET | Refills: 0 | Status: SHIPPED | OUTPATIENT
Start: 2022-12-01 | End: 2022-12-09 | Stop reason: SDUPTHER

## 2022-12-01 NOTE — PROGRESS NOTES
1. \"Have you been to the ER, urgent care clinic since your last visit? Hospitalized since your last visit? \"  [facility]    2. \"Have you seen or consulted any other health care providers outside of the [address] [place] [address] since your last visit? \" No     3. For patients aged 39-70: Has the patient had a colonoscopy / FIT/ Cologuard? NA - based on age      If the patient is female:    4. For patients aged 41-77: Has the patient had a mammogram within the past 2 years? NA - based on age or sex      11. For patients aged 21-65: Has the patient had a pap smear?  No

## 2022-12-01 NOTE — PROGRESS NOTES
Heidy Lane is a 33 y.o. female who was seen in clinic today (12/1/2022) for Attention Deficit Disorder    Assessment & Plan:   Diagnoses and all orders for this visit:    1. Attention deficit hyperactivity disorder (ADHD), unspecified ADHD type    2. Encounter for long-term (current) use of high-risk medication  -     9-DRUG SCREEN, TREATMENT CTR; Future    3. Gastroesophageal reflux disease, unspecified whether esophagitis present  -     REFERRAL TO GASTROENTEROLOGY    4. Type 1 diabetes mellitus with hyperglycemia (HCC)    Other orders  -     insulin glargine (Lantus U-100 Insulin) 100 unit/mL injection; 50 Units by SubCUTAneous route daily. Patient medication usage agreement for controlled substances signed see scanned copy  Urine drug screen was collected today. Last dosage of medication was over 1 months ago. I have discussed the diagnosis with the patient and the intended plan as seen in the above orders. The patient has received an after-visit summary and questions were answered concerning future plans. I have discussed medication side effects and warnings with the patient as well. Patient agreeable with above plan and verbalizes understanding. Follow-up and Dispositions    Return in about 4 weeks (around 12/29/2022) for DM, virtual follow up. Subjective:   ADHD  The patient is a 33 y.o. female who is seen for follow up of ADHD. Current ADHD and related symptoms: inattention. Symptoms are stable since last visit. Current medication compliance: compliant when she has medications, hasn't had medications in over . She is tolerating current treatment well and denies none. New concerns: Patient's been having increase reflux symptoms since her admission in Sept 2022 for DKA. States she has been taking Prilosec daily as prescribed without improvement. Reports she feels acid, describes as rough feeling, on the back of her teeth and also they are changing colors.   Reports she has not had an endoscopy in the past.  Admits to family history of Cat's esophagus. DM: requesting refill on lantus, requesting vials due to cost effectiveness. States she is waiting on her insurance to become active to schedule an appointment with Dr. Ana Howard. 8/31/2022 virtual visit with ALEKSANDR Khalil is a 33 y.o. female who was seen by synchronous (real-time) audio-video technology on 8/31/2022 for Skin Problem and Letter for School/Work      Assessment & Plan:   Diagnoses and all orders for this visit:    1. Abscess of groin, left  -     trimethoprim-sulfamethoxazole (BACTRIM DS, SEPTRA DS) 160-800 mg per tablet; Take 1 Tablet by mouth two (2) times a day for 10 days. Indications: an infection of the skin and the tissue below the skin       Follow-up and Dispositions    Return if symptoms worsen or fail to improve with PCP. Subjective:     Skin Infection Review    Iris Hylton is an 33 y.o. female who presents for a skin infection located on the left groin. Onset of symptoms was 3 days ago, with gradually worsening since that time. Symptoms included erythema located on groin and pain of moderate. Patient denies fever greater than 100 and chills. There is not a history trauma to the area, but the patient does shave with a razor in the area. Treatment to date has included warm compresses with minimal relief. Pt states her left groin is hard and paiful to the touch. Pt is also an insulin dependent type 1 diabetic.      Lab Results   Component Value Date/Time    Sodium 141 09/23/2022 05:34 PM    Potassium 3.1 (L) 09/23/2022 05:34 PM    Chloride 105 09/23/2022 05:34 PM    CO2 30 09/23/2022 05:34 PM    Anion gap 6 09/23/2022 05:34 PM    Glucose 310 (H) 09/23/2022 05:34 PM    BUN 8 09/23/2022 05:34 PM    Creatinine 0.45 (L) 09/23/2022 05:34 PM    BUN/Creatinine ratio 18 09/23/2022 05:34 PM    GFR est AA >60 09/23/2022 05:34 PM    GFR est non-AA >60 09/23/2022 05:34 PM    Calcium 8.4 (L) 09/23/2022 05:34 PM    Bilirubin, total 0.6 09/21/2022 05:00 PM    Alk. phosphatase 56 09/21/2022 05:00 PM    Protein, total 7.7 09/21/2022 05:00 PM    Albumin 4.1 09/21/2022 05:00 PM    Globulin 3.6 09/21/2022 05:00 PM    A-G Ratio 1.1 09/21/2022 05:00 PM    ALT (SGPT) 19 09/21/2022 05:00 PM    AST (SGOT) 6 (L) 09/21/2022 05:00 PM     Lab Results   Component Value Date/Time    Cholesterol, total 199 (H) 06/12/2015 10:40 AM    HDL Cholesterol 90 06/12/2015 10:40 AM    LDL, calculated 101 06/12/2015 10:40 AM    Triglyceride 42 06/12/2015 10:40 AM     Lab Results   Component Value Date/Time    Hemoglobin A1c 9.9 (H) 09/21/2022 05:00 PM    Hemoglobin A1c, External 9.0 07/12/2021 12:00 AM     Lab Results   Component Value Date/Time    VITAMIN D, 25-HYDROXY 34.6 05/14/2015 12:26 PM       Lab Results   Component Value Date/Time    WBC 5.7 09/23/2022 01:30 AM    HGB 13.0 09/23/2022 01:30 AM    HCT 37.3 09/23/2022 01:30 AM    PLATELET 224 35/73/5937 01:30 AM    MCV 91.2 09/23/2022 01:30 AM       Wt Readings from Last 3 Encounters:   09/22/22 140 lb 12.8 oz (63.9 kg)   09/03/22 135 lb (61.2 kg)   04/08/22 142 lb (64.4 kg)     Temp Readings from Last 3 Encounters:   09/23/22 98.2 °F (36.8 °C)   09/03/22 99 °F (37.2 °C)   04/08/22 98.8 °F (37.1 °C) (Temporal)     BP Readings from Last 3 Encounters:   09/23/22 (!) 141/92   09/03/22 (!) 97/56   04/08/22 121/76     Pulse Readings from Last 3 Encounters:   09/23/22 79   09/03/22 95   04/08/22 (!) 121       Prior to Admission medications    Medication Sig Start Date End Date Taking? Authorizing Provider   dextroamphetamine-amphetamine (ADDERALL) 20 mg tablet Take 1 Tablet by mouth two (2) times a day. Max Daily Amount: 40 mg. 10/25/22   Mat MENDOZA NP   dextroamphetamine-amphetamine (ADDERALL) 5 mg tablet Take 1 Tablet by mouth two (2) times a day.  Max Daily Amount: 10 mg. 10/25/22   Mat MENDOZA NP   Insulin Needles, Disposable, 31 gauge x 5/16\" ndle Diabetic supplies:  Use as directed for blood glucose management. 9/23/22   Deon Gerber MD   HumaLOG KwikPen Insulin 100 unit/mL kwikpen INJECT AS INSTRUCTED (MAX OF 50 UNITS PER DAY) 9/23/22   Deon Gerber MD   insulin glargine (LANTUS) 100 unit/mL injection 35 Units by SubCUTAneous route nightly. 9/23/22   Deon Gerber MD   omeprazole (PRILOSEC) 20 mg capsule Take 1 Capsule by mouth Daily (before breakfast). 9/23/22   Deon Gerber MD         The following sections were reviewed & updated as appropriate: PMH, PSH, FH, and SH. Review of Systems       Objective: There were no vitals taken for this visit. Physical Exam      Disclaimer: The patient understands our medical plan. Alternatives have been explained and offered. The risks, benefits and significant side effects of all medications have been reviewed. Anticipated time course and progression of condition reviewed. All questions have been addressed. She is encouraged to employ the information provided in the after visit summary, which was reviewed. Where applicable, she is instructed to call the clinic if she has not been notified either by phone or through [address] with the results of her tests or with an appointment plan for any referrals within 1 week(s). No news is not good news; it's no news. The patient  is to call if her condition worsens or fails to improve or if significant side effects are experienced. Aspects of this note may have been generated using voice recognition software. Despite editing, there may be unrecognized errors.        Elly Islas NP

## 2022-12-05 LAB
AMPHETAMINES UR QL SCN: POSITIVE NG/ML
BARBITURATES UR QL SCN: NEGATIVE NG/ML
BENZODIAZ UR QL SCN: NEGATIVE NG/ML
BZE UR QL SCN: NEGATIVE NG/ML
CANNABINOIDS UR QL SCN: NEGATIVE NG/ML
DRUG SCREEN COMMENT:, 753798: ABNORMAL
METHADONE UR QL SCN: NEGATIVE NG/ML
OPIATES UR QL SCN: NEGATIVE NG/ML
PCP UR QL SCN: NEGATIVE NG/ML
PROPOXYPH UR QL SCN: NEGATIVE NG/ML

## 2022-12-22 ENCOUNTER — TELEPHONE (OUTPATIENT)
Dept: FAMILY MEDICINE CLINIC | Age: 32
End: 2022-12-22

## 2022-12-22 NOTE — TELEPHONE ENCOUNTER
Attempted call to patient to verify current insurance if any, line was answered, held with someone on line then hung up.  Was able to run and pull medicaid that was effective as of 12/1/22, no coverage prior

## 2023-01-11 DIAGNOSIS — F90.9 ATTENTION DEFICIT HYPERACTIVITY DISORDER (ADHD), UNSPECIFIED ADHD TYPE: ICD-10-CM

## 2023-01-11 NOTE — TELEPHONE ENCOUNTER
Requested Prescriptions     Pending Prescriptions Disp Refills    dextroamphetamine-amphetamine (ADDERALL) 5 mg tablet 60 Tablet 0     Sig: Take 1 Tablet by mouth two (2) times a day. Max Daily Amount: 10 mg.    dextroamphetamine-amphetamine (ADDERALL) 20 mg tablet 60 Tablet 0     Sig: Take 1 Tablet by mouth two (2) times a day. Max Daily Amount: 40 mg.

## 2023-01-11 NOTE — TELEPHONE ENCOUNTER
Requested Prescriptions     Pending Prescriptions Disp Refills    insulin glargine (Lantus U-100 Insulin) 100 unit/mL injection 10 mL 2     Si Units by SubCUTAneous route daily. Patient states never picked up rx from [facility] from , says Rhea Perez says still never recvd, [facility] [address].

## 2023-01-12 RX ORDER — DEXTROAMPHETAMINE SACCHARATE, AMPHETAMINE ASPARTATE, DEXTROAMPHETAMINE SULFATE AND AMPHETAMINE SULFATE 5; 5; 5; 5 MG/1; MG/1; MG/1; MG/1
20 TABLET ORAL 2 TIMES DAILY
Qty: 60 TABLET | Refills: 0 | Status: SHIPPED | OUTPATIENT
Start: 2023-01-14

## 2023-01-12 RX ORDER — INSULIN GLARGINE 100 [IU]/ML
50 INJECTION, SOLUTION SUBCUTANEOUS DAILY
Qty: 10 ML | Refills: 2 | Status: SHIPPED | OUTPATIENT
Start: 2023-01-12

## 2023-01-12 RX ORDER — DEXTROAMPHETAMINE SACCHARATE, AMPHETAMINE ASPARTATE, DEXTROAMPHETAMINE SULFATE AND AMPHETAMINE SULFATE 1.25; 1.25; 1.25; 1.25 MG/1; MG/1; MG/1; MG/1
5 TABLET ORAL 2 TIMES DAILY
Qty: 60 TABLET | Refills: 0 | Status: SHIPPED | OUTPATIENT
Start: 2023-01-14

## 2023-01-12 NOTE — TELEPHONE ENCOUNTER
[facility] 20 mg  on 12/15/22 for a 30 d/s. Adderall 5 mg on 12/16/22 for a 30 d/s. PMDP: 01/12/23  I have reviewed the patient's controlled substance prescription history thru the Prescription Monitoring Program, so that the prescription(s) for a controlled substance can be given.     Urine Drug Screen: yes, 12/1/22    Controlled substance agreement: yes, 8/31/22

## 2023-01-19 ENCOUNTER — VIRTUAL VISIT (OUTPATIENT)
Dept: FAMILY MEDICINE CLINIC | Age: 33
End: 2023-01-19
Payer: MEDICAID

## 2023-01-19 DIAGNOSIS — Z02.89 ENCOUNTER FOR COMPLETION OF FORM WITH PATIENT: Primary | ICD-10-CM

## 2023-01-19 DIAGNOSIS — E10.65 TYPE 1 DIABETES MELLITUS WITH HYPERGLYCEMIA (HCC): ICD-10-CM

## 2023-01-19 PROCEDURE — 99213 OFFICE O/P EST LOW 20 MIN: CPT | Performed by: NURSE PRACTITIONER

## 2023-01-19 RX ORDER — CALCIUM CARB/VITAMIN D3/VIT K1 500-100-40
TABLET,CHEWABLE ORAL
Qty: 100 EACH | Refills: 11 | Status: SHIPPED | OUTPATIENT
Start: 2023-01-19

## 2023-01-19 RX ORDER — INSULIN LISPRO 100 [IU]/ML
INJECTION, SOLUTION INTRAVENOUS; SUBCUTANEOUS
Qty: 5 EACH | Refills: 5 | Status: SHIPPED | OUTPATIENT
Start: 2023-01-19

## 2023-01-19 RX ORDER — PEN NEEDLE, DIABETIC 30 GX3/16"
NEEDLE, DISPOSABLE MISCELLANEOUS
Qty: 1 EACH | Refills: 11 | Status: SHIPPED | OUTPATIENT
Start: 2023-01-19

## 2023-01-19 NOTE — PROGRESS NOTES
Annabella Willis (: 1990) is a 33 y.o. female, established patient, here for evaluation of the following chief complaint(s):   Form Completion         Annabella Willis, was evaluated through a synchronous (real-time) audio-video encounter. The patient (or guardian if applicable) is aware that this is a billable service, which includes applicable co-pays. This Virtual Visit was conducted with patient's (and/or legal guardian's) consent. The visit was conducted pursuant to the emergency declaration under the [address] [place] [address], [address] [place] [address] authority and the [employer] General Act. Patient identification was verified, and a caregiver was present when appropriate. The patient was located at: Home: [address] [place] [address]  The provider was located at: Facility (Appt Department): [address]  [address]       An [address] [facility] was used to authenticate this note.   -- Mariama Hammond

## 2023-01-19 NOTE — PROGRESS NOTES
Called patient at 9:53 to begin virtual appointment with ALEKSANDR Felix but patient did not answer. Will try calling patient again shortly.

## 2023-01-19 NOTE — PROGRESS NOTES
Shashank Apodaca is a 33 y.o. female who was seen by synchronous (real-time) audio-video technology on 1/19/2023 for Form Completion    Assessment & Plan:   Diagnoses and all orders for this visit:    1. Encounter for completion of form with patient    2. Type 1 diabetes mellitus with hyperglycemia (HCC)  Assessment & Plan:   monitored by specialist. No acute findings meriting change in the plan        Other orders  -     HumaLOG KwikPen Insulin 100 unit/mL kwikpen; INJECT AS INSTRUCTED (MAX OF 50 UNITS PER DAY)  -     Insulin Needles, Disposable, 31 gauge x 5/16\" ndle; Use as directed for blood glucose management. -     Insulin Syringe-Needle U-100 1 mL 31 gauge x 5/16 syrg; To use with lantus      Follow-up and Dispositions    Return if symptoms worsen or fail to improve, for keep previous scheduled appt. 712  Subjective:   Patient states she needs unemployment form and [employer] forms completed. Needs refill on humalog and insulin syringes. Prior to Admission medications    Medication Sig Start Date End Date Taking? Authorizing Provider   dextroamphetamine-amphetamine (ADDERALL) 5 mg tablet Take 1 Tablet by mouth two (2) times a day. Max Daily Amount: 10 mg. 1/14/23   Ziyad MENDOZA NP   dextroamphetamine-amphetamine (ADDERALL) 20 mg tablet Take 1 Tablet by mouth two (2) times a day. Max Daily Amount: 40 mg. 1/14/23   Ziyad MENDOZA NP   insulin glargine (Lantus U-100 Insulin) 100 unit/mL injection 50 Units by SubCUTAneous route daily. 1/12/23   Ziyad MENDOZA NP   Insulin Needles, Disposable, 31 gauge x 5/16\" ndle Diabetic supplies:  Use as directed for blood glucose management. 9/23/22   Devan Clark MD   HumaLOG KwikPen Insulin 100 unit/mL kwikpen INJECT AS INSTRUCTED (MAX OF 50 UNITS PER DAY) 9/23/22   Devan Clark MD   omeprazole (PRILOSEC) 20 mg capsule Take 1 Capsule by mouth Daily (before breakfast).  9/23/22   Devan Clark MD     Patient Active Problem List   Diagnosis Code    DKA (diabetic ketoacidoses) E11.10    Type 1 diabetes mellitus with hyperglycemia ([facility]) E10.65    DKA (diabetic ketoacidosis) ([facility] E11.10     Patient Active Problem List    Diagnosis Date Noted    DKA (diabetic ketoacidosis) ([facility] 09/21/2022    Type 1 diabetes mellitus with hyperglycemia ([facility] 04/08/2022    DKA (diabetic ketoacidoses) 02/10/2017     Current Outpatient Medications   Medication Sig Dispense Refill    dextroamphetamine-amphetamine (ADDERALL) 5 mg tablet Take 1 Tablet by mouth two (2) times a day. Max Daily Amount: 10 mg. 60 Tablet 0    dextroamphetamine-amphetamine (ADDERALL) 20 mg tablet Take 1 Tablet by mouth two (2) times a day. Max Daily Amount: 40 mg. 60 Tablet 0    insulin glargine (Lantus U-100 Insulin) 100 unit/mL injection 50 Units by SubCUTAneous route daily. 10 mL 2    Insulin Needles, Disposable, 31 gauge x 5/16\" ndle Diabetic supplies:  Use as directed for blood glucose management. 1 Each 11    HumaLOG KwikPen Insulin 100 unit/mL kwikpen INJECT AS INSTRUCTED (MAX OF 50 UNITS PER DAY) 2 Each 3    omeprazole (PRILOSEC) 20 mg capsule Take 1 Capsule by mouth Daily (before breakfast). (Patient not taking: Reported on 1/19/2023) 90 Capsule 0     No Known Allergies  Past Medical History:   Diagnosis Date    ADHD (attention deficit hyperactivity disorder)     Diabetes mellitus type 1 ([facility]     Endometriosis      Past Surgical History:   Procedure Laterality Date    HX BACK SURGERY  5/23/2014    lower back     HX ORTHOPAEDIC  5/23/2014    right medial ankle     HX OTHER SURGICAL Left     pt had a gail put into left leg     Family History   Problem Relation Age of Onset    Arthritis-rheumatoid Paternal Grandmother     Hypertension Mother     Diabetes Maternal Grandmother         type 2    Heart Disease Neg Hx      Social History     Tobacco Use    Smoking status: Light Smoker    Smokeless tobacco: Never   Substance Use Topics    Alcohol use:  Yes     Alcohol/week: 4.0 standard drinks Types: 4 Standard drinks or equivalent per week     Comment: occasionally        ROS    Objective:   No flowsheet data found. General: alert, cooperative, no distress   Mental  status: normal mood, behavior, speech, dress, motor activity, and thought processes, able to follow commands   HENT: NCAT   Neck: no visualized mass   Resp: no respiratory distress   Neuro: no gross deficits   Skin: no discoloration or lesions of concern on visible areas   Psychiatric: normal affect, consistent with stated mood, no evidence of hallucinations     Additional exam findings: We discussed the expected course, resolution and complications of the diagnosis(es) in detail. Medication risks, benefits, costs, interactions, and alternatives were discussed as indicated. I advised her to contact the office if her condition worsens, changes or fails to improve as anticipated. She expressed understanding with the diagnosis(es) and plan. Juwan Ann, was evaluated through a synchronous (real-time) audio-video encounter. The patient (or guardian if applicable) is aware that this is a billable service, which includes applicable co-pays. This Virtual Visit was conducted with patient's (and/or legal guardian's) consent. The visit was conducted pursuant to the emergency declaration under the [facility], [facility] authority and the [employer] General Act. Patient identification was verified, and a caregiver was present when appropriate. The patient was located at: Home: [address] [place] [address]  The provider was located at:  Facility (Appt Department): [address] [place] [address]  [address] ALEKSANDR Teran

## 2023-01-31 ENCOUNTER — VIRTUAL VISIT (OUTPATIENT)
Dept: FAMILY MEDICINE CLINIC | Age: 33
End: 2023-01-31

## 2023-02-22 DIAGNOSIS — F90.9 ATTENTION DEFICIT HYPERACTIVITY DISORDER (ADHD), UNSPECIFIED ADHD TYPE: Primary | ICD-10-CM

## 2023-02-22 RX ORDER — DEXTROAMPHETAMINE SACCHARATE, AMPHETAMINE ASPARTATE, DEXTROAMPHETAMINE SULFATE AND AMPHETAMINE SULFATE 5; 5; 5; 5 MG/1; MG/1; MG/1; MG/1
TABLET ORAL
COMMUNITY
Start: 2023-01-17 | End: 2023-02-24 | Stop reason: SDUPTHER

## 2023-02-22 RX ORDER — INSULIN GLARGINE 100 [IU]/ML
INJECTION, SOLUTION SUBCUTANEOUS
COMMUNITY
Start: 2023-01-12 | End: 2023-02-24 | Stop reason: SDUPTHER

## 2023-02-22 RX ORDER — INSULIN LISPRO 100 [IU]/ML
INJECTION, SOLUTION INTRAVENOUS; SUBCUTANEOUS
COMMUNITY
Start: 2022-09-23 | End: 2023-02-24 | Stop reason: SDUPTHER

## 2023-02-22 RX ORDER — DEXTROAMPHETAMINE SACCHARATE, AMPHETAMINE ASPARTATE, DEXTROAMPHETAMINE SULFATE AND AMPHETAMINE SULFATE 1.25; 1.25; 1.25; 1.25 MG/1; MG/1; MG/1; MG/1
TABLET ORAL
COMMUNITY
Start: 2023-01-20 | End: 2023-02-24 | Stop reason: SDUPTHER

## 2023-02-22 NOTE — TELEPHONE ENCOUNTER
[facility] last [facility] date for Adderall 20mg as 1/17/23 for a 30 d/s. Last Visit: 1/13/23  Next Appointment: 3/3/23  Previous Refill Encounter(s): Date: 1/14/23 #60  Controlled Substance Agreement: 12/1/23  Urine Drug Screen: N/A    [facility]  reports the last fill date for Adderall 5mg as 1/20/23 for a 30 d/s. Last Visit: 1/13/23  Next Appointment: 3/3/23  Previous Refill Encounter(s): Date: 1/14/23 #60  Controlled Substance Agreement: 12/1/23  Urine Drug Screen: N/A    Requested Prescriptions     Pending Prescriptions Disp Refills    amphetamine-dextroamphetamine (ADDERALL) 20 MG tablet 30 tablet      Sig: TAKE 1 TABLET BY MOUTH 2 TIMES A DAY. **DNF 1/14/23    amphetamine-dextroamphetamine (ADDERALL) 5 MG tablet 30 tablet      Sig: TAKE 1 TABLET BY MOUTH 2 TIMES A DAY. insulin lispro, 1 Unit Dial, (HUMALOG KWIKPEN) 100 UNIT/ML SOPN       Sig: INJECT AS INSTRUCTED (MAX OF 50 UNITS PER DAY)    LANTUS 100 UNIT/ML injection vial 10 mL      Sig: INJECT 50 UNITS BENEATH SKIN DAILY.

## 2023-02-22 NOTE — TELEPHONE ENCOUNTER
----- Message from Alecia Damari sent at 2/22/2023  1:24 PM EST -----  Subject: Refill Request    QUESTIONS  Name of Medication? Other - adderall  Patient-reported dosage and instructions? 20mg tab  How many days do you have left? 0  Preferred Pharmacy? [facility] [facility] phone number (if available)? [phone]  ---------------------------------------------------------------------------  --------------,  Name of Medication? Other - adderall  Patient-reported dosage and instructions? 5mg tab  How many days do you have left? 0  Preferred Pharmacy? [facility] phone number (if available)? [phone]  ---------------------------------------------------------------------------  --------------,  Name of Medication? Other - humalog kwikpen insulin  Patient-reported dosage and instructions? 100unit/ml kwikpen  How many days do you have left? 0  Preferred Pharmacy? [facility] phone number (if available)? [phone]  ---------------------------------------------------------------------------  --------------,  Name of Medication? Other - lantus u1-100 insulin  Patient-reported dosage and instructions? 100unit/ml injection  How many days do you have left? 1  [id number] (if available)? [phone]  ---------------------------------------------------------------------------  --------------  CALL BACK INFO  What is the best way for the office to contact you? OK to leave message on   voicemail  Preferred Call Back Phone Number? [phone]  ---------------------------------------------------------------------------  --------------  SCRIPT ANSWERS  Relationship to Patient?  Self

## 2023-02-24 RX ORDER — DEXTROAMPHETAMINE SACCHARATE, AMPHETAMINE ASPARTATE, DEXTROAMPHETAMINE SULFATE AND AMPHETAMINE SULFATE 5; 5; 5; 5 MG/1; MG/1; MG/1; MG/1
TABLET ORAL
Qty: 60 TABLET | Refills: 0 | Status: SHIPPED | OUTPATIENT
Start: 2023-02-24 | End: 2023-03-24

## 2023-02-24 RX ORDER — INSULIN GLARGINE 100 [IU]/ML
INJECTION, SOLUTION SUBCUTANEOUS
Qty: 10 ML | Refills: 5 | Status: SHIPPED | OUTPATIENT
Start: 2023-02-24

## 2023-02-24 RX ORDER — INSULIN LISPRO 100 [IU]/ML
INJECTION, SOLUTION INTRAVENOUS; SUBCUTANEOUS
Qty: 5 ADJUSTABLE DOSE PRE-FILLED PEN SYRINGE | Refills: 5 | Status: SHIPPED | OUTPATIENT
Start: 2023-02-24

## 2023-02-24 RX ORDER — DEXTROAMPHETAMINE SACCHARATE, AMPHETAMINE ASPARTATE, DEXTROAMPHETAMINE SULFATE AND AMPHETAMINE SULFATE 1.25; 1.25; 1.25; 1.25 MG/1; MG/1; MG/1; MG/1
TABLET ORAL
Qty: 60 TABLET | Refills: 0 | Status: SHIPPED | OUTPATIENT
Start: 2023-02-24 | End: 2023-03-24

## 2023-02-28 ENCOUNTER — TELEPHONE (OUTPATIENT)
Age: 33
End: 2023-02-28

## 2023-03-03 ENCOUNTER — TELEMEDICINE (OUTPATIENT)
Age: 33
End: 2023-03-03
Payer: COMMERCIAL

## 2023-03-03 DIAGNOSIS — E10.65 TYPE 1 DIABETES MELLITUS WITH HYPERGLYCEMIA (HCC): ICD-10-CM

## 2023-03-03 DIAGNOSIS — F90.9 ATTENTION DEFICIT HYPERACTIVITY DISORDER (ADHD), UNSPECIFIED ADHD TYPE: Primary | ICD-10-CM

## 2023-03-03 PROCEDURE — G8427 DOCREV CUR MEDS BY ELIG CLIN: HCPCS | Performed by: NURSE PRACTITIONER

## 2023-03-03 PROCEDURE — 3046F HEMOGLOBIN A1C LEVEL >9.0%: CPT | Performed by: NURSE PRACTITIONER

## 2023-03-03 PROCEDURE — 2022F DILAT RTA XM EVC RTNOPTHY: CPT | Performed by: NURSE PRACTITIONER

## 2023-03-03 PROCEDURE — 99214 OFFICE O/P EST MOD 30 MIN: CPT | Performed by: NURSE PRACTITIONER

## 2023-03-03 RX ORDER — OMEPRAZOLE 20 MG/1
20 CAPSULE, DELAYED RELEASE ORAL
Qty: 90 CAPSULE | Refills: 2 | Status: SHIPPED | OUTPATIENT
Start: 2023-03-03

## 2023-03-03 RX ORDER — DEXTROAMPHETAMINE SACCHARATE, AMPHETAMINE ASPARTATE, DEXTROAMPHETAMINE SULFATE AND AMPHETAMINE SULFATE 7.5; 7.5; 7.5; 7.5 MG/1; MG/1; MG/1; MG/1
30 TABLET ORAL 2 TIMES DAILY
Qty: 60 TABLET | Refills: 0 | Status: SHIPPED | OUTPATIENT
Start: 2023-03-03 | End: 2023-04-02

## 2023-03-03 SDOH — ECONOMIC STABILITY: FOOD INSECURITY: WITHIN THE PAST 12 MONTHS, YOU WORRIED THAT YOUR FOOD WOULD RUN OUT BEFORE YOU GOT MONEY TO BUY MORE.: SOMETIMES TRUE

## 2023-03-03 SDOH — ECONOMIC STABILITY: FOOD INSECURITY: WITHIN THE PAST 12 MONTHS, THE FOOD YOU BOUGHT JUST DIDN'T LAST AND YOU DIDN'T HAVE MONEY TO GET MORE.: SOMETIMES TRUE

## 2023-03-03 SDOH — ECONOMIC STABILITY: HOUSING INSECURITY
IN THE LAST 12 MONTHS, WAS THERE A TIME WHEN YOU DID NOT HAVE A STEADY PLACE TO SLEEP OR SLEPT IN A SHELTER (INCLUDING NOW)?: PATIENT REFUSED

## 2023-03-03 SDOH — ECONOMIC STABILITY: INCOME INSECURITY: HOW HARD IS IT FOR YOU TO PAY FOR THE VERY BASICS LIKE FOOD, HOUSING, MEDICAL CARE, AND HEATING?: HARD

## 2023-03-03 ASSESSMENT — PATIENT HEALTH QUESTIONNAIRE - PHQ9
SUM OF ALL RESPONSES TO PHQ QUESTIONS 1-9: 0
1. LITTLE INTEREST OR PLEASURE IN DOING THINGS: 0
SUM OF ALL RESPONSES TO PHQ9 QUESTIONS 1 & 2: 0
SUM OF ALL RESPONSES TO PHQ QUESTIONS 1-9: 0
2. FEELING DOWN, DEPRESSED OR HOPELESS: 0

## 2023-03-03 NOTE — PROGRESS NOTES
Arlyn Youssef is a 29 y.o. (1990) female is a Established patient, who was seen by synchronous (real-time) audio-video technology on 3/3/2023 for evaluation of the following:   Chief Complaint   Patient presents with    Diabetes      Assessment & Plan:   Terry Mcintyre was seen today for diabetes. Diagnoses and all orders for this visit:    Attention deficit hyperactivity disorder (ADHD), unspecified ADHD type  -     amphetamine-dextroamphetamine (ADDERALL, 30MG,) 30 MG tablet; Take 1 tablet by mouth 2 times daily for 30 days. Max Daily Amount: 60 mg    Type 1 diabetes mellitus with hyperglycemia (HCC)  -     Lipid Panel; Future  -     Hemoglobin A1C; Future  -     Comprehensive Metabolic Panel; Future  -     Microalbumin / Creatinine Urine Ratio; Future    Other orders  -     omeprazole (PRILOSEC) 20 MG delayed release capsule; Take 1 capsule by mouth every morning (before breakfast)    Increase adderall to 30 mg bid  Continue all other medications at current dosages    Return in about 3 months (around 6/3/2023) for ADD/ADHD, telemedicine MyChart. Subjective:   Prediabetic Review of Systems - medication compliance: compliant all of the time, diet compliance: compliant all of the time, further ROS: no polyuria or polydipsia, no chest pain, dyspnea or TIA's, no numbness, tingling or pain in extremities. Reports her glucose has been on avg 200s. Lifestyle/diet: follows a diabetic diet regularly    Patient states she is in able to get in to endocrinologist for another 2 months, she is currently on a waiting list.  Reports she was informed she may be able to get in sooner given she is not a new patient. Patient further states she is unable to get her Adderall 20 mg dosage due to medication shortage. She was able to get her Adderall 5 mg tabs filled and has been taking 4 tabs in the morning and 2-3 tabs in the afternoon. Prior to Admission medications    Medication Sig Start Date End Date Taking? Authorizing Provider   omeprazole (PRILOSEC) 20 MG delayed release capsule Take 20 mg by mouth every morning (before breakfast) 9/23/22  Yes Ar Automatic Reconciliation   amphetamine-dextroamphetamine (ADDERALL) 20 MG tablet TAKE 1 TABLET BY MOUTH 2 TIMES A DAY. 2/24/23 3/24/23 Yes RADHA Mckeon NP   amphetamine-dextroamphetamine (ADDERALL) 5 MG tablet TAKE 1 TABLET BY MOUTH 2 TIMES A DAY. 2/24/23 3/24/23 Yes RADHA Mckeon NP   insulin lispro, 1 Unit Dial, (HUMALOG KWIKPEN) 100 UNIT/ML SOPN INJECT AS INSTRUCTED (MAX OF 50 UNITS PER DAY) 2/24/23  Yes RADHA Mckeon NP   LANTUS 100 UNIT/ML injection vial INJECT 50 UNITS BENEATH SKIN DAILY. 2/24/23  Yes RADHA Mckeon NP   amphetamine-dextroamphetamine (ADDERALL) 20 MG tablet Take 20 mg by mouth 2 times daily. Patient not taking: Reported on 3/3/2023 12/13/22   Ar Automatic Reconciliation     No Known Allergies    Review of Systems    Objective: There were no vitals taken for this visit. General: alert, cooperative, no distress   Mental  status: normal mood, behavior, speech, dress, motor activity, and thought processes, able to follow commands   HENT: NCAT   Neck: no visualized mass   Resp: no respiratory distress   Neuro: no gross deficits   Skin: no discoloration or lesions of concern on visible areas   Psychiatric: normal affect, consistent with stated mood, no evidence of hallucinations     Additional exam findings: We discussed the expected course, resolution and complications of the diagnosis(es) in detail. Medication risks, benefits, costs, interactions, and alternatives were discussed as indicated. I advised her to contact the office if her condition worsens, changes or fails to improve as anticipated. She expressed understanding with the diagnosis(es) and plan.      Consent: Tracie Hair, who was seen by synchronous (real-time) audio-video technology, and/or her healthcare decision maker, is aware that this patient-initiated, Telehealth encounter on 3/3/2023 is a billable service, with coverage as determined by her insurance carrier. She is aware that she may receive a bill and has provided verbal consent to proceed: yes    Fidencio Canchola, was evaluated through a synchronous (real-time) audio-video encounter. The patient (or guardian if applicable) is aware that this is a billable service, which includes applicable co-pays. This Virtual Visit was conducted with patient's (and/or legal guardian's) consent. The visit was conducted pursuant to the emergency declaration under the [address] [place] [address], [address] [place] [address] authority and the [employer] General Act. Patient identification was verified, and a caregiver was present when appropriate. The patient was located at Home: [address] [place] [address]  Provider was located at [facility] (Appt Dept): [address] [facility], [address] [place] [address],  [address] [place] [address] [address]         RADHA Vargas NP  An electronic signature was used to authenticate this note.

## 2023-03-03 NOTE — PROGRESS NOTES
Twyla Evans (:  1990) is a Established patient, here for evaluation of the following:        Twyla Evans, was evaluated through a synchronous (real-time) audio-video encounter. The patient (or guardian if applicable) is aware that this is a billable service, which includes applicable co-pays. This Virtual Visit was conducted with patient's (and/or legal guardian's) consent. The visit was conducted pursuant to the emergency declaration under the [address] [place] [address], [address] [place] [address] authority and the [employer] Act. Patient identification was verified, and a caregiver was present when appropriate.    The patient was located at Home: [address] [place] [id number]  Provider was located at [facility] (Appt Dept): [address]  [address]

## 2023-03-03 NOTE — PATIENT INSTRUCTIONS
[facility] Laboratory Locations    Sites open Monday to Friday. ? indicates the location is open Saturdays. Call your preferred location for test preparation, business hours and other information you need. ? [facility] at [address] [facility] [facility] [address]. [address], [address]    [phone]  Monday - Friday,  7:00 a.m. - 5:00 p.m. Saturday,   No appointment needed  [facility], [address] [facility]  [phone]  Monday-Friday, 6:30 a.m. - 6:00 p.m. No appointment needed     ? DEEPIKA THOMSON [facility]  [address], [address]  Monday - Friday, 7:00 a.m. - 6:00 p.m. Saturday, 9:00 a.m. - noon    [address]  [address] [facility]  [phone]  Monday-Friday, 7:00 a.m. - 6:00  p.m. No appointment needed    ? [facility] - [facility], [address]  [phone]  Monday - Friday, 7:00 a.m. - 3:30 p.m.     Saturday, 7:00 a.m. to noon  No appointment needed

## 2023-04-26 ENCOUNTER — HOSPITAL ENCOUNTER (OUTPATIENT)
Facility: HOSPITAL | Age: 33
Setting detail: SPECIMEN
Discharge: HOME | End: 2023-04-29

## 2023-04-26 ENCOUNTER — OFFICE VISIT (OUTPATIENT)
Age: 33
End: 2023-04-26
Payer: MEDICAID

## 2023-04-26 ENCOUNTER — HOSPITAL ENCOUNTER (OUTPATIENT)
Facility: HOSPITAL | Age: 33
Setting detail: SPECIMEN
Discharge: HOME | End: 2023-04-29
Payer: MEDICAID

## 2023-04-26 VITALS
WEIGHT: 136.2 LBS | DIASTOLIC BLOOD PRESSURE: 75 MMHG | BODY MASS INDEX: 25.06 KG/M2 | SYSTOLIC BLOOD PRESSURE: 136 MMHG | HEART RATE: 113 BPM | HEIGHT: 62 IN | TEMPERATURE: 98.3 F | OXYGEN SATURATION: 98 % | RESPIRATION RATE: 20 BRPM

## 2023-04-26 DIAGNOSIS — E10.65 TYPE 1 DIABETES MELLITUS WITH HYPERGLYCEMIA (HCC): ICD-10-CM

## 2023-04-26 DIAGNOSIS — Z01.419 ENCOUNTER FOR GYNECOLOGICAL EXAMINATION WITHOUT ABNORMAL FINDING: Primary | ICD-10-CM

## 2023-04-26 DIAGNOSIS — Z12.4 SCREENING FOR MALIGNANT NEOPLASM OF CERVIX: ICD-10-CM

## 2023-04-26 PROCEDURE — 87624 HPV HI-RISK TYP POOLED RSLT: CPT

## 2023-04-26 PROCEDURE — 88175 CYTOPATH C/V AUTO FLUID REDO: CPT

## 2023-04-26 PROCEDURE — 99214 OFFICE O/P EST MOD 30 MIN: CPT | Performed by: NURSE PRACTITIONER

## 2023-04-26 PROCEDURE — 82043 UR ALBUMIN QUANTITATIVE: CPT

## 2023-04-26 PROCEDURE — 82570 ASSAY OF URINE CREATININE: CPT

## 2023-04-26 RX ORDER — INSULIN LISPRO 100 [IU]/ML
INJECTION, SOLUTION INTRAVENOUS; SUBCUTANEOUS
Qty: 5 ADJUSTABLE DOSE PRE-FILLED PEN SYRINGE | Refills: 11 | Status: CANCELLED | OUTPATIENT
Start: 2023-04-26

## 2023-04-26 ASSESSMENT — PATIENT HEALTH QUESTIONNAIRE - PHQ9
SUM OF ALL RESPONSES TO PHQ QUESTIONS 1-9: 0
2. FEELING DOWN, DEPRESSED OR HOPELESS: 0
SUM OF ALL RESPONSES TO PHQ9 QUESTIONS 1 & 2: 0
SUM OF ALL RESPONSES TO PHQ QUESTIONS 1-9: 0
1. LITTLE INTEREST OR PLEASURE IN DOING THINGS: 0
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
## 2023-04-26 NOTE — PROGRESS NOTES
1. \"Have you been to the ER, urgent care clinic since your last visit? Hospitalized since your last visit? \" No    2. \"Have you seen or consulted any other health care providers outside of the [address] [place] [address] since your last visit? \"  [facility] Kiah Kasper      3. For patients aged 39-70: Has the patient had a colonoscopy / FIT/ Cologuard? NA - based on age      If the patient is female:    4. For patients aged 41-77: Has the patient had a mammogram within the past 2 years? NA - based on age or sex      11. For patients aged 21-65: Has the patient had a pap smear?  No

## 2023-04-27 LAB
CREAT UR-MCNC: 66 MG/DL (ref 30–125)
MICROALBUMIN UR-MCNC: 0.77 MG/DL (ref 0–3)
MICROALBUMIN/CREAT UR-RTO: 12 MG/G (ref 0–30)

## 2023-05-01 RX ORDER — METRONIDAZOLE 500 MG/1
500 TABLET ORAL 2 TIMES DAILY
Qty: 14 TABLET | Refills: 0 | Status: SHIPPED | OUTPATIENT
Start: 2023-05-01 | End: 2023-05-08

## 2023-05-22 DIAGNOSIS — F90.9 ATTENTION DEFICIT HYPERACTIVITY DISORDER (ADHD), UNSPECIFIED ADHD TYPE: ICD-10-CM

## 2023-05-23 ENCOUNTER — PATIENT MESSAGE (OUTPATIENT)
Age: 33
End: 2023-05-23

## 2023-05-23 DIAGNOSIS — F90.9 ATTENTION DEFICIT HYPERACTIVITY DISORDER (ADHD), UNSPECIFIED ADHD TYPE: ICD-10-CM

## 2023-05-23 RX ORDER — DEXTROAMPHETAMINE SACCHARATE, AMPHETAMINE ASPARTATE, DEXTROAMPHETAMINE SULFATE AND AMPHETAMINE SULFATE 7.5; 7.5; 7.5; 7.5 MG/1; MG/1; MG/1; MG/1
30 TABLET ORAL 2 TIMES DAILY
Qty: 60 TABLET | Refills: 0 | OUTPATIENT
Start: 2023-05-23 | End: 2023-06-22

## 2023-05-24 ENCOUNTER — TELEPHONE (OUTPATIENT)
Age: 33
End: 2023-05-24

## 2023-05-24 NOTE — TELEPHONE ENCOUNTER
Patient states [facility] does not her medication and would like [facility]. [facility]  as [facility]15/23 for a 30 d/s. Last Visit: 4/26/23  Next Appointment: None  Previous Refill Encounter(s): Date: 4/15/23 #60  Controlled Substance Agreement: 8/31/22  Urine Drug Screen: None    Requested Prescriptions     Pending Prescriptions Disp Refills    amphetamine-dextroamphetamine (ADDERALL) 30 MG tablet 60 tablet 0     Sig: Take 1 tablet by mouth 2 times daily for 30 days.  Max Daily Amount: 60 mg

## 2023-05-24 NOTE — TELEPHONE ENCOUNTER
Patient requesting medication refill sent to [facility] on [address] states [facility] states medication was never received and do not have medication in stock     Phone [phone]    amphetamine-dextroamphetamine (ADDERALL) 30 MG tablet

## 2023-05-26 RX ORDER — DEXTROAMPHETAMINE SACCHARATE, AMPHETAMINE ASPARTATE, DEXTROAMPHETAMINE SULFATE AND AMPHETAMINE SULFATE 7.5; 7.5; 7.5; 7.5 MG/1; MG/1; MG/1; MG/1
30 TABLET ORAL 2 TIMES DAILY
Qty: 60 TABLET | Refills: 0 | Status: SHIPPED | OUTPATIENT
Start: 2023-05-26 | End: 2023-06-25

## 2023-05-26 NOTE — TELEPHONE ENCOUNTER
I spoke to patient about her medication refill. I informed her that I would speak with Josseline Denny today and see if she can get it completed immediately.

## 2023-06-26 DIAGNOSIS — F90.9 ATTENTION DEFICIT HYPERACTIVITY DISORDER (ADHD), UNSPECIFIED ADHD TYPE: ICD-10-CM

## 2023-06-26 NOTE — TELEPHONE ENCOUNTER
Last Visit: 04- OV   Next Appointment: none  Previous Refill Encounter: adderall on 05- #60 tabs  with 0 refills  Humalog on 04- # 5 adjustable dose pre-filled pen syringe with 11 refills   Lantus on 04- # 10 mL with 11 refills   Last Urine Drug Screen: 10-  Controlled Substance Agreement: 08-  : Last filled on 05- for 30  d/s. Wants to be sent to different pharmacy. Requested Prescriptions     Pending Prescriptions Disp Refills    LANTUS 100 UNIT/ML injection vial 10 mL 11     Sig: INJECT 50 UNITS BENEATH SKIN DAILY. insulin lispro, 1 Unit Dial, (HUMALOG KWIKPEN) 100 UNIT/ML SOPN 5 Adjustable Dose Pre-filled Pen Syringe 11     Sig: INJECT AS INSTRUCTED (MAX OF 50 UNITS PER DAY)    amphetamine-dextroamphetamine (ADDERALL) 30 MG tablet 60 tablet 0     Sig: Take 1 tablet by mouth 2 times daily for 30 days.  Max Daily Amount: 60 mg

## 2023-06-28 DIAGNOSIS — F90.9 ATTENTION DEFICIT HYPERACTIVITY DISORDER (ADHD), UNSPECIFIED ADHD TYPE: ICD-10-CM

## 2023-06-28 RX ORDER — INSULIN GLARGINE 100 [IU]/ML
INJECTION, SOLUTION SUBCUTANEOUS
Qty: 10 ML | Refills: 11 | OUTPATIENT
Start: 2023-06-28

## 2023-06-28 RX ORDER — INSULIN LISPRO 100 [IU]/ML
INJECTION, SOLUTION INTRAVENOUS; SUBCUTANEOUS
Qty: 5 ADJUSTABLE DOSE PRE-FILLED PEN SYRINGE | Refills: 11 | OUTPATIENT
Start: 2023-06-28

## 2023-06-28 RX ORDER — DEXTROAMPHETAMINE SACCHARATE, AMPHETAMINE ASPARTATE, DEXTROAMPHETAMINE SULFATE AND AMPHETAMINE SULFATE 7.5; 7.5; 7.5; 7.5 MG/1; MG/1; MG/1; MG/1
30 TABLET ORAL 2 TIMES DAILY
Qty: 60 TABLET | Refills: 0 | OUTPATIENT
Start: 2023-06-28 | End: 2023-07-28

## 2023-06-29 RX ORDER — INSULIN GLARGINE 100 [IU]/ML
INJECTION, SOLUTION SUBCUTANEOUS
Qty: 10 ML | Refills: 11 | OUTPATIENT
Start: 2023-06-29

## 2023-06-29 RX ORDER — DEXTROAMPHETAMINE SACCHARATE, AMPHETAMINE ASPARTATE, DEXTROAMPHETAMINE SULFATE AND AMPHETAMINE SULFATE 7.5; 7.5; 7.5; 7.5 MG/1; MG/1; MG/1; MG/1
30 TABLET ORAL 2 TIMES DAILY
Qty: 60 TABLET | Refills: 0 | OUTPATIENT
Start: 2023-06-29 | End: 2023-07-29

## 2023-06-29 RX ORDER — INSULIN LISPRO 100 [IU]/ML
INJECTION, SOLUTION INTRAVENOUS; SUBCUTANEOUS
Qty: 5 ADJUSTABLE DOSE PRE-FILLED PEN SYRINGE | Refills: 11 | OUTPATIENT
Start: 2023-06-29

## 2023-06-30 DIAGNOSIS — F90.9 ATTENTION DEFICIT HYPERACTIVITY DISORDER (ADHD), UNSPECIFIED ADHD TYPE: ICD-10-CM

## 2023-06-30 RX ORDER — INSULIN GLARGINE 100 [IU]/ML
INJECTION, SOLUTION SUBCUTANEOUS
Qty: 10 ML | Refills: 11 | Status: SHIPPED | OUTPATIENT
Start: 2023-06-30

## 2023-06-30 RX ORDER — DEXTROAMPHETAMINE SACCHARATE, AMPHETAMINE ASPARTATE, DEXTROAMPHETAMINE SULFATE AND AMPHETAMINE SULFATE 7.5; 7.5; 7.5; 7.5 MG/1; MG/1; MG/1; MG/1
30 TABLET ORAL 2 TIMES DAILY
Qty: 60 TABLET | Refills: 0 | Status: SHIPPED | OUTPATIENT
Start: 2023-06-30 | End: 2023-07-28 | Stop reason: ALTCHOICE

## 2023-06-30 RX ORDER — INSULIN LISPRO 100 [IU]/ML
INJECTION, SOLUTION INTRAVENOUS; SUBCUTANEOUS
Qty: 5 ADJUSTABLE DOSE PRE-FILLED PEN SYRINGE | Refills: 11 | Status: SHIPPED | OUTPATIENT
Start: 2023-06-30

## 2023-07-26 DIAGNOSIS — F90.9 ATTENTION DEFICIT HYPERACTIVITY DISORDER (ADHD), UNSPECIFIED ADHD TYPE: ICD-10-CM

## 2023-07-26 RX ORDER — INSULIN LISPRO 100 [IU]/ML
INJECTION, SOLUTION INTRAVENOUS; SUBCUTANEOUS
Qty: 5 ADJUSTABLE DOSE PRE-FILLED PEN SYRINGE | Refills: 11 | Status: CANCELLED | OUTPATIENT
Start: 2023-07-26

## 2023-07-26 RX ORDER — INSULIN GLARGINE 100 [IU]/ML
INJECTION, SOLUTION SUBCUTANEOUS
Qty: 10 ML | Refills: 11 | Status: CANCELLED | OUTPATIENT
Start: 2023-07-26

## 2023-07-26 RX ORDER — DEXTROAMPHETAMINE SACCHARATE, AMPHETAMINE ASPARTATE, DEXTROAMPHETAMINE SULFATE AND AMPHETAMINE SULFATE 7.5; 7.5; 7.5; 7.5 MG/1; MG/1; MG/1; MG/1
30 TABLET ORAL 2 TIMES DAILY
Qty: 60 TABLET | Refills: 0 | Status: CANCELLED | OUTPATIENT
Start: 2023-07-26 | End: 2023-08-25

## 2023-07-26 NOTE — TELEPHONE ENCOUNTER
Last Visit: 04- OV   Next Appointment: none  Previous Refill Encounter: adderall 30 mg on 06- #60 tabs with 0 refills  Last Urine Drug Screen: 10-  Controlled Substance Agreement: 08-  : Last filled on 06- for 30 d/s. Requested Prescriptions     Pending Prescriptions Disp Refills    amphetamine-dextroamphetamine (ADDERALL) 30 MG tablet 60 tablet 0     Sig: Take 1 tablet by mouth 2 times daily for 30 days.  Max Daily Amount: 60 mg

## 2023-07-27 DIAGNOSIS — F90.9 ATTENTION DEFICIT HYPERACTIVITY DISORDER (ADHD), UNSPECIFIED ADHD TYPE: ICD-10-CM

## 2023-07-28 RX ORDER — INSULIN LISPRO 100 [IU]/ML
INJECTION, SOLUTION INTRAVENOUS; SUBCUTANEOUS
Qty: 5 ADJUSTABLE DOSE PRE-FILLED PEN SYRINGE | Refills: 11 | OUTPATIENT
Start: 2023-07-28

## 2023-07-28 RX ORDER — DEXTROAMPHETAMINE SACCHARATE, AMPHETAMINE ASPARTATE, DEXTROAMPHETAMINE SULFATE AND AMPHETAMINE SULFATE 7.5; 7.5; 7.5; 7.5 MG/1; MG/1; MG/1; MG/1
30 TABLET ORAL 2 TIMES DAILY
Qty: 60 TABLET | Refills: 0 | Status: SHIPPED | OUTPATIENT
Start: 2023-09-26 | End: 2023-10-26

## 2023-07-28 RX ORDER — DEXTROAMPHETAMINE SACCHARATE, AMPHETAMINE ASPARTATE, DEXTROAMPHETAMINE SULFATE AND AMPHETAMINE SULFATE 7.5; 7.5; 7.5; 7.5 MG/1; MG/1; MG/1; MG/1
30 TABLET ORAL 2 TIMES DAILY
Qty: 60 TABLET | Refills: 0 | Status: SHIPPED | OUTPATIENT
Start: 2023-08-27 | End: 2023-09-26

## 2023-07-28 RX ORDER — DEXTROAMPHETAMINE SACCHARATE, AMPHETAMINE ASPARTATE, DEXTROAMPHETAMINE SULFATE AND AMPHETAMINE SULFATE 7.5; 7.5; 7.5; 7.5 MG/1; MG/1; MG/1; MG/1
30 TABLET ORAL 2 TIMES DAILY
Qty: 60 TABLET | Refills: 0 | Status: SHIPPED | OUTPATIENT
Start: 2023-07-28 | End: 2023-08-27

## 2023-07-28 RX ORDER — DEXTROAMPHETAMINE SACCHARATE, AMPHETAMINE ASPARTATE, DEXTROAMPHETAMINE SULFATE AND AMPHETAMINE SULFATE 7.5; 7.5; 7.5; 7.5 MG/1; MG/1; MG/1; MG/1
30 TABLET ORAL 2 TIMES DAILY
Qty: 60 TABLET | Refills: 0 | OUTPATIENT
Start: 2023-07-28 | End: 2023-08-27

## 2023-07-28 RX ORDER — INSULIN GLARGINE 100 [IU]/ML
INJECTION, SOLUTION SUBCUTANEOUS
Qty: 10 ML | Refills: 11 | OUTPATIENT
Start: 2023-07-28

## 2023-08-12 ENCOUNTER — HOSPITAL ENCOUNTER (EMERGENCY)
Facility: HOSPITAL | Age: 33
Discharge: HOME | End: 2023-08-12

## 2023-08-12 NOTE — ED NOTES
Pt brought into triage room and pt stated she didn't want to wait and is going to go home and call 911. Offered to triage pt and start work up and  pt declined.        Nat Urena RN  08/12/23 [id number]

## 2023-08-28 DIAGNOSIS — F90.9 ATTENTION DEFICIT HYPERACTIVITY DISORDER (ADHD), UNSPECIFIED ADHD TYPE: ICD-10-CM

## 2023-08-28 NOTE — TELEPHONE ENCOUNTER
Duplicate Request- Patient's pharmacy is requesting Adderall. Patient's prescription was sent to [facility] on 8/27/23.

## 2023-09-01 RX ORDER — DEXTROAMPHETAMINE SACCHARATE, AMPHETAMINE ASPARTATE, DEXTROAMPHETAMINE SULFATE AND AMPHETAMINE SULFATE 7.5; 7.5; 7.5; 7.5 MG/1; MG/1; MG/1; MG/1
30 TABLET ORAL 2 TIMES DAILY
Qty: 60 TABLET | Refills: 0 | OUTPATIENT
Start: 2023-09-01 | End: 2023-10-01

## 2023-09-22 DIAGNOSIS — F90.9 ATTENTION DEFICIT HYPERACTIVITY DISORDER (ADHD), UNSPECIFIED ADHD TYPE: ICD-10-CM

## 2023-09-22 RX ORDER — DEXTROAMPHETAMINE SACCHARATE, AMPHETAMINE ASPARTATE, DEXTROAMPHETAMINE SULFATE AND AMPHETAMINE SULFATE 7.5; 7.5; 7.5; 7.5 MG/1; MG/1; MG/1; MG/1
30 TABLET ORAL 2 TIMES DAILY
Qty: 60 TABLET | Refills: 0 | Status: CANCELLED | OUTPATIENT
Start: 2023-09-22 | End: 2023-10-22

## 2023-09-22 RX ORDER — INSULIN LISPRO 100 [IU]/ML
INJECTION, SOLUTION INTRAVENOUS; SUBCUTANEOUS
Qty: 5 ADJUSTABLE DOSE PRE-FILLED PEN SYRINGE | Refills: 11 | Status: CANCELLED | OUTPATIENT
Start: 2023-09-22

## 2023-09-22 RX ORDER — INSULIN GLARGINE 100 [IU]/ML
INJECTION, SOLUTION SUBCUTANEOUS
Qty: 10 ML | Refills: 11 | Status: CANCELLED | OUTPATIENT
Start: 2023-09-22

## 2023-09-28 ENCOUNTER — PATIENT MESSAGE (OUTPATIENT)
Age: 33
End: 2023-09-28

## 2023-09-28 DIAGNOSIS — F90.9 ATTENTION DEFICIT HYPERACTIVITY DISORDER (ADHD), UNSPECIFIED ADHD TYPE: ICD-10-CM

## 2023-09-28 RX ORDER — DEXTROAMPHETAMINE SACCHARATE, AMPHETAMINE ASPARTATE, DEXTROAMPHETAMINE SULFATE AND AMPHETAMINE SULFATE 7.5; 7.5; 7.5; 7.5 MG/1; MG/1; MG/1; MG/1
30 TABLET ORAL 2 TIMES DAILY
Qty: 60 TABLET | Refills: 0 | OUTPATIENT
Start: 2023-09-28 | End: 2023-10-28

## 2023-09-28 RX ORDER — INSULIN GLARGINE 100 [IU]/ML
INJECTION, SOLUTION SUBCUTANEOUS
Qty: 10 ML | Refills: 11 | OUTPATIENT
Start: 2023-09-28

## 2023-09-29 DIAGNOSIS — F90.9 ATTENTION DEFICIT HYPERACTIVITY DISORDER (ADHD), UNSPECIFIED ADHD TYPE: ICD-10-CM

## 2023-09-29 RX ORDER — DEXTROAMPHETAMINE SACCHARATE, AMPHETAMINE ASPARTATE, DEXTROAMPHETAMINE SULFATE AND AMPHETAMINE SULFATE 7.5; 7.5; 7.5; 7.5 MG/1; MG/1; MG/1; MG/1
30 TABLET ORAL 2 TIMES DAILY
Qty: 60 TABLET | Refills: 0 | OUTPATIENT
Start: 2023-09-29 | End: 2023-10-29

## 2023-09-29 NOTE — TELEPHONE ENCOUNTER
Patient was given a confirmation receipt of her prescription being sent over to her pharmacy. Patient was informed to contact her pharmacy. Patient verbalized understanding.

## 2023-09-29 NOTE — TELEPHONE ENCOUNTER
From: Donavan Asif  To: Constantino Gill  Sent: 9/28/2023 4:29 PM EDT  Subject: Medication refill     Ignacio Nelson,    It is time for me to refill my aderall medication it keeps telling me you are unable to process. It was too soon the first time I requested I think. It has been 30 days since I picked up last time.     Thanks

## 2023-10-30 DIAGNOSIS — F90.9 ATTENTION DEFICIT HYPERACTIVITY DISORDER (ADHD), UNSPECIFIED ADHD TYPE: ICD-10-CM

## 2023-10-30 RX ORDER — DEXTROAMPHETAMINE SACCHARATE, AMPHETAMINE ASPARTATE, DEXTROAMPHETAMINE SULFATE AND AMPHETAMINE SULFATE 7.5; 7.5; 7.5; 7.5 MG/1; MG/1; MG/1; MG/1
30 TABLET ORAL 2 TIMES DAILY
Qty: 60 TABLET | Refills: 0 | Status: CANCELLED | OUTPATIENT
Start: 2023-10-30 | End: 2023-11-29

## 2023-10-30 NOTE — TELEPHONE ENCOUNTER
Last Visit: 04- OV   Next Appointment: 11-  Previous Refill Encounter: 09- #60tabs with 0 refills  Last Urine Drug Screen: 10-  Controlled Substance Agreement: 08-  : Last filled on 09- for 30 d/s. Requested Prescriptions     Pending Prescriptions Disp Refills    amphetamine-dextroamphetamine (ADDERALL) 30 MG tablet 60 tablet 0     Sig: Take 1 tablet by mouth 2 times daily for 30 days.  Max Daily Amount: 60 mg

## 2023-10-31 ENCOUNTER — PATIENT MESSAGE (OUTPATIENT)
Age: 33
End: 2023-10-31

## 2023-10-31 RX ORDER — DEXTROAMPHETAMINE SACCHARATE, AMPHETAMINE ASPARTATE, DEXTROAMPHETAMINE SULFATE AND AMPHETAMINE SULFATE 7.5; 7.5; 7.5; 7.5 MG/1; MG/1; MG/1; MG/1
30 TABLET ORAL 2 TIMES DAILY
Qty: 60 TABLET | Refills: 0 | Status: SHIPPED | OUTPATIENT
Start: 2023-12-30 | End: 2024-01-29

## 2023-10-31 RX ORDER — DEXTROAMPHETAMINE SACCHARATE, AMPHETAMINE ASPARTATE, DEXTROAMPHETAMINE SULFATE AND AMPHETAMINE SULFATE 7.5; 7.5; 7.5; 7.5 MG/1; MG/1; MG/1; MG/1
30 TABLET ORAL 2 TIMES DAILY
Qty: 60 TABLET | Refills: 0 | Status: SHIPPED | OUTPATIENT
Start: 2023-10-31 | End: 2023-11-30

## 2023-10-31 RX ORDER — DEXTROAMPHETAMINE SACCHARATE, AMPHETAMINE ASPARTATE, DEXTROAMPHETAMINE SULFATE AND AMPHETAMINE SULFATE 7.5; 7.5; 7.5; 7.5 MG/1; MG/1; MG/1; MG/1
30 TABLET ORAL 2 TIMES DAILY
Qty: 60 TABLET | Refills: 0 | Status: SHIPPED | OUTPATIENT
Start: 2023-11-30 | End: 2023-12-30

## 2023-10-31 NOTE — TELEPHONE ENCOUNTER
Patient called states that she has to go to [facility] today to stay for the next month due to a bone marrow transplant situation and wants to know if provider would go ahead and send her adderall rx to the pharmacy and that she has changed her appt to [facility], did advise that I would send her request to her provider and went over the appt info due to the notes stating that labs are needed, pt stated that she no longer wanted to speak to me and only wanted the  to call her to get done.  Explained again that I would send her request to her provider along with the situation and she again state to just send to the manager for a call back

## 2023-11-08 ENCOUNTER — TELEMEDICINE (OUTPATIENT)
Age: 33
End: 2023-11-08

## 2023-11-08 DIAGNOSIS — Z91.199 NO-SHOW FOR APPOINTMENT: Primary | ICD-10-CM
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
## 2023-12-07 ENCOUNTER — TELEMEDICINE (OUTPATIENT)
Age: 33
End: 2023-12-07
Payer: COMMERCIAL

## 2023-12-07 DIAGNOSIS — F90.9 ATTENTION DEFICIT HYPERACTIVITY DISORDER (ADHD), UNSPECIFIED ADHD TYPE: Primary | ICD-10-CM

## 2023-12-07 DIAGNOSIS — E10.65 TYPE 1 DIABETES MELLITUS WITH HYPERGLYCEMIA (HCC): ICD-10-CM

## 2023-12-07 PROCEDURE — 99213 OFFICE O/P EST LOW 20 MIN: CPT | Performed by: NURSE PRACTITIONER

## 2023-12-07 NOTE — PROGRESS NOTES
Kaylan Stewart, was evaluated through a synchronous (real-time) audio-video encounter. The patient (or guardian if applicable) is aware that this is a billable service, which includes applicable co-pays. This Virtual Visit was conducted with patient's (and/or legal guardian's) consent. Patient identification was verified, and a caregiver was present when appropriate.   The patient was located at Home: [address] [place] [id number]  Provider was located at Facility (Appt Dept): [address]  [place] [address]      Kaylan Stewart (:  1990) is a Established patient, presenting virtually for evaluation of the following:      --Michael Hill

## 2023-12-07 NOTE — PROGRESS NOTES
Kaylan Stewart is a 33 y.o. (1990) female is a Established patient, who was seen by synchronous (real-time) audio-video technology on 12/7/2023 for evaluation of the following:   Chief Complaint   Patient presents with    ADD      Assessment & Plan:   Kaylan was seen today for add.    Diagnoses and all orders for this visit:    Attention deficit hyperactivity disorder (ADHD), unspecified ADHD type    Type 1 diabetes mellitus with hyperglycemia (HCC)    Other orders  -     fluconazole (DIFLUCAN) 150 MG tablet; Take 1 tablet by mouth every 3 days for 3 doses      Return in about 4 months (around 4/7/2024) for DM, ADD/ADHD, fasting labs 1 week prior, in office.    Subjective:   ADHD  The patient is a 33 y.o. female who is seen for follow up of ADHD.  Current ADHD and related symptoms: inattention.  Symptoms are controlled since last visit.  Current medication compliance: all of the time.   She is tolerating current treatment well and denies hypertension, dizziness, anorexia, weight loss, palpitations, and insomnia.    Vaginal discharge: patient reports she thinks she may have a yeast infection.  Comments she has been experiencing vaginal itching, mild cottage cheese like discharge.  Admits to glucose readings being elevated.    4/26/2023 Well woman  Assessment:      Healthy female exam.      Plan:       All questions answered  Await Pap smear results.      I have discussed the diagnosis with the patient and the intended plan as seen in the above orders.  The patient has received an after-visit summary and questions were answered concerning future plans.  I have discussed medication side effects and warnings with the patient as well. Patient agreeable with above plan and verbalizes understanding.    Return in about 4 months (around 8/26/2023) for ADD/ADHD, telemedicine MyChart.    Subjective:      Kaylan Stewart is a 33 y.o. female who presents for an annual exam. The patient has no complaints today.

## 2023-12-08 ENCOUNTER — TELEPHONE (OUTPATIENT)
Age: 33
End: 2023-12-08

## 2023-12-08 NOTE — TELEPHONE ENCOUNTER
Casey Whalen from [facility] called requesting Pre-op notes Notes. Pre-op notes have been sent to [facility] and faxed confirmation has been scanned in patient chart.

## 2023-12-08 NOTE — TELEPHONE ENCOUNTER
Patient calling to check on status of medication for yeast infection states discuss during virtual visit 12/07/23.

## 2023-12-14 ENCOUNTER — TELEPHONE (OUTPATIENT)
Age: 33
End: 2023-12-14

## 2023-12-14 RX ORDER — FLUCONAZOLE 150 MG/1
150 TABLET ORAL
Qty: 3 TABLET | Refills: 0 | Status: SHIPPED | OUTPATIENT
Start: 2023-12-14 | End: 2023-12-21

## 2023-12-14 NOTE — TELEPHONE ENCOUNTER
Patient is here for labs today, she wanted to make a f/u vv with Kaylynn. In the notes it states in office visit, she stated that her and Latoya Han have talked about her bone marrow transplant that's happening in April and can't come in office.

## 2024-01-02 DIAGNOSIS — E10.65 TYPE 1 DIABETES MELLITUS WITH HYPERGLYCEMIA (HCC): ICD-10-CM

## 2024-01-02 DIAGNOSIS — F90.9 ATTENTION DEFICIT HYPERACTIVITY DISORDER (ADHD), UNSPECIFIED ADHD TYPE: ICD-10-CM

## 2024-01-02 RX ORDER — DEXTROAMPHETAMINE SACCHARATE, AMPHETAMINE ASPARTATE, DEXTROAMPHETAMINE SULFATE AND AMPHETAMINE SULFATE 5; 5; 5; 5 MG/1; MG/1; MG/1; MG/1
30 TABLET ORAL 2 TIMES DAILY
Qty: 90 TABLET | Refills: 0 | Status: SHIPPED | OUTPATIENT
Start: 2024-01-02 | End: 2024-02-01

## 2024-01-02 RX ORDER — INSULIN GLARGINE 100 [IU]/ML
50 INJECTION, SOLUTION SUBCUTANEOUS NIGHTLY
Qty: 15 ML | Refills: 11 | OUTPATIENT
Start: 2024-01-02

## 2024-01-02 NOTE — TELEPHONE ENCOUNTER
Last Visit: 12- VV   Next Appointment: none  Previous Refill Encounter: 11- #90 tabs with 0 refills  Last Urine Drug Screen: 10-  Controlled Substance Agreement: 08-  : Last filled on 11- for 30 d/s.     Requested Prescriptions     Pending Prescriptions Disp Refills    amphetamine-dextroamphetamine (ADDERALL, 20MG,) 20 MG tablet 90 tablet 0     Sig: Take 1.5 tablets by mouth 2 times daily for 30 days. Max Daily Amount: 60 mg

## 2024-01-03 NOTE — TELEPHONE ENCOUNTER
Last Visit: 12- VV   Next Appointment: none      Requested Prescriptions     Pending Prescriptions Disp Refills    insulin lispro, 1 Unit Dial, (HUMALOG KWIKPEN) 100 UNIT/ML SOPN 5 Adjustable Dose Pre-filled Pen Syringe 11     Sig: INJECT AS INSTRUCTED (MAX OF 50 UNITS PER DAY)

## 2024-01-08 RX ORDER — INSULIN LISPRO 100 [IU]/ML
INJECTION, SOLUTION INTRAVENOUS; SUBCUTANEOUS
Qty: 5 ADJUSTABLE DOSE PRE-FILLED PEN SYRINGE | Refills: 11 | Status: SHIPPED | OUTPATIENT
Start: 2024-01-08

## 2024-02-20 DIAGNOSIS — E10.65 TYPE 1 DIABETES MELLITUS WITH HYPERGLYCEMIA (HCC): ICD-10-CM

## 2024-02-20 DIAGNOSIS — F90.9 ATTENTION DEFICIT HYPERACTIVITY DISORDER (ADHD), UNSPECIFIED ADHD TYPE: ICD-10-CM

## 2024-02-21 NOTE — TELEPHONE ENCOUNTER
[facility]der[facility] as 24 for a 30 d/s.      Last Visit: 23  Next Appointment: None  Previous Refill Encounter(s) Adderall: Date: 24 #90 Refill 0  Previous Refill Encounter(s) Lantus: Date: 23 #15ml Refill 11  Previous Refill Encounter(s) Insulin Pen Needle: Date: 23 #100  Previous Refill Encounter(s) Humalog: Date: 24 #5 Refill 11  Controlled Substance Agreement: None  Urine Drug Screen: None    Requested Prescriptions     Pending Prescriptions Disp Refills    insulin glargine (LANTUS SOLOSTAR) 100 UNIT/ML injection pen 15 mL 11     Sig: Inject 50 Units into the skin nightly    Insulin Pen Needle 32G X 4 MM MISC 100 each 3     Si each by Does not apply route daily To use once daily with lantus solostar    amphetamine-dextroamphetamine (ADDERALL, 20MG,) 20 MG tablet 90 tablet 0     Sig: Take 1.5 tablets by mouth 2 times daily for 30 days. Max Daily Amount: 60 mg    insulin lispro, 1 Unit Dial, (HUMALOG KWIKPEN) 100 UNIT/ML SOPN 5 Adjustable Dose Pre-filled Pen Syringe 11     Sig: INJECT AS INSTRUCTED (MAX OF 50 UNITS PER DAY)

## 2024-02-25 RX ORDER — INSULIN LISPRO 100 [IU]/ML
INJECTION, SOLUTION INTRAVENOUS; SUBCUTANEOUS
Qty: 5 ADJUSTABLE DOSE PRE-FILLED PEN SYRINGE | Refills: 11 | Status: SHIPPED | OUTPATIENT
Start: 2024-02-25

## 2024-02-25 RX ORDER — INSULIN GLARGINE 100 [IU]/ML
50 INJECTION, SOLUTION SUBCUTANEOUS NIGHTLY
Qty: 15 ML | Refills: 11 | Status: SHIPPED | OUTPATIENT
Start: 2024-02-25

## 2024-02-25 RX ORDER — DEXTROAMPHETAMINE SACCHARATE, AMPHETAMINE ASPARTATE, DEXTROAMPHETAMINE SULFATE AND AMPHETAMINE SULFATE 5; 5; 5; 5 MG/1; MG/1; MG/1; MG/1
30 TABLET ORAL 2 TIMES DAILY
Qty: 90 TABLET | Refills: 0 | Status: SHIPPED | OUTPATIENT
Start: 2024-02-25 | End: 2024-03-26

## 2024-03-13 ENCOUNTER — TELEPHONE (OUTPATIENT)
Age: 34
End: 2024-03-13

## 2024-03-13 NOTE — TELEPHONE ENCOUNTER
----- Message from Ursula Tapia sent at 3/13/2024  9:41 AM EDT -----  Subject: Hospital Follow Up    QUESTIONS  What hospital [facility] the [facility]  Date of Discharge? 2024-03-13  Discharge Location? Home  Reason for hospitalization as patient stated? DKA  What question does the patient have, if applicable?   ---------------------------------------------------------------------------  --------------  CALL BACK INFO  What is the best way for the office to contact you? OK to leave message on   voicemail  Preferred Call Back Phone Number? [phone]  ---------------------------------------------------------------------------  --------------  SCRIPT ANSWERS  Relationship to Patient? Covered Entity  Covered Entity Type? Hospital?  Representative Name? gurjit   Clear

## 2024-03-18 NOTE — TELEPHONE ENCOUNTER
Care Transitions Initial Follow Up Call    Outreach made within 2 business days of discharge: Yes    Patient: Kaylan Stewart Patient : 1990   MRN: [id number]  Reason for Admission: DKA Discharge Date: 3/13/23       Spoke with: N/A Tired calling patient on 3/13/24 at 10:34 but was unable to reach patient and voice mailbox is full.   Tired calling patient on 3/18/24 at 9:59 but was unable to reach patient and voice mailbox is full.     [facility]    [facility] Interactive Patient Contact:  Was patient able to fill all prescriptions:   Was patient instructed to bring all medications to the follow-up visit:   Is patient taking all medications as directed in the discharge summary?   Does patient understand their discharge instructions:   Does patient have questions or concerns that need addressed prior to 7-14 day follow up office visit:     Scheduled appointment with PCP within 7-14 days    Follow Up  No future appointments.    Michael Hill

## 2024-03-26 DIAGNOSIS — F90.9 ATTENTION DEFICIT HYPERACTIVITY DISORDER (ADHD), UNSPECIFIED ADHD TYPE: ICD-10-CM

## 2024-03-26 RX ORDER — DEXTROAMPHETAMINE SACCHARATE, AMPHETAMINE ASPARTATE, DEXTROAMPHETAMINE SULFATE AND AMPHETAMINE SULFATE 5; 5; 5; 5 MG/1; MG/1; MG/1; MG/1
30 TABLET ORAL 2 TIMES DAILY
Qty: 90 TABLET | Refills: 0 | Status: CANCELLED | OUTPATIENT
Start: 2024-03-26 | End: 2024-04-25

## 2024-03-26 RX ORDER — INSULIN LISPRO 100 [IU]/ML
INJECTION, SOLUTION INTRAVENOUS; SUBCUTANEOUS
Qty: 5 ADJUSTABLE DOSE PRE-FILLED PEN SYRINGE | Refills: 11 | Status: CANCELLED | OUTPATIENT
Start: 2024-03-26

## 2024-03-26 NOTE — TELEPHONE ENCOUNTER
[facility] last fill date for Adderall as 2/26/24 for a 90 d/s.      Last Visit: 12/7/23  Next Appointment: None  Previous Refill Encounter(s): Date: 2/25/24 #90  Controlled Substance Agreement: None  Urine Drug Screen: None    Previous Refill Encounter(s) Humalog: Date: 2/25/24 #5 Refills 11    Requested Prescriptions     Pending Prescriptions Disp Refills    amphetamine-dextroamphetamine (ADDERALL, 20MG,) 20 MG tablet 90 tablet 0     Sig: Take 1.5 tablets by mouth 2 times daily for 30 days. Max Daily Amount: 60 mg    insulin lispro, 1 Unit Dial, (HUMALOG KWIKPEN) 100 UNIT/ML SOPN 5 Adjustable Dose Pre-filled Pen Syringe 11     Sig: INJECT AS INSTRUCTED (MAX OF 50 UNITS PER DAY)

## 2024-04-03 RX ORDER — DEXTROAMPHETAMINE SACCHARATE, AMPHETAMINE ASPARTATE, DEXTROAMPHETAMINE SULFATE AND AMPHETAMINE SULFATE 5; 5; 5; 5 MG/1; MG/1; MG/1; MG/1
30 TABLET ORAL 2 TIMES DAILY
Qty: 90 TABLET | Refills: 0 | Status: SHIPPED | OUTPATIENT
Start: 2024-06-02 | End: 2024-07-02

## 2024-04-03 RX ORDER — DEXTROAMPHETAMINE SACCHARATE, AMPHETAMINE ASPARTATE, DEXTROAMPHETAMINE SULFATE AND AMPHETAMINE SULFATE 5; 5; 5; 5 MG/1; MG/1; MG/1; MG/1
30 TABLET ORAL 2 TIMES DAILY
Qty: 90 TABLET | Refills: 0 | Status: SHIPPED | OUTPATIENT
Start: 2024-04-03 | End: 2024-05-03

## 2024-04-03 RX ORDER — DEXTROAMPHETAMINE SACCHARATE, AMPHETAMINE ASPARTATE, DEXTROAMPHETAMINE SULFATE AND AMPHETAMINE SULFATE 5; 5; 5; 5 MG/1; MG/1; MG/1; MG/1
30 TABLET ORAL 2 TIMES DAILY
Qty: 90 TABLET | Refills: 0 | Status: SHIPPED | OUTPATIENT
Start: 2024-05-03 | End: 2024-06-02

## 2024-05-03 DIAGNOSIS — F90.9 ATTENTION DEFICIT HYPERACTIVITY DISORDER (ADHD), UNSPECIFIED ADHD TYPE: ICD-10-CM

## 2024-05-03 NOTE — TELEPHONE ENCOUNTER
Patient was notified via [employer] a prescription was sent to her pharmacy on 4/3/24 with a start day of 5/3/24. Patient notified to contact pharmacy for her prescription.

## 2024-05-07 NOTE — TELEPHONE ENCOUNTER
[facility] last fill date for Adderall as 4/4/24 for a 30 d/s.      Last Visit: 12/7/23  Next Appointment: None  Previous Refill Encounter(s): Date: 5/3/24 #0  Controlled Substance Agreement: None  Urine Drug Screen: None    Requested Prescriptions     Pending Prescriptions Disp Refills    amphetamine-dextroamphetamine (ADDERALL) 20 MG tablet 90 tablet 0     Sig: Take 1.5 tablets by mouth 2 times daily for 30 days. Max Daily Amount: 60 mg

## 2024-05-13 RX ORDER — DEXTROAMPHETAMINE SACCHARATE, AMPHETAMINE ASPARTATE, DEXTROAMPHETAMINE SULFATE AND AMPHETAMINE SULFATE 5; 5; 5; 5 MG/1; MG/1; MG/1; MG/1
30 TABLET ORAL 2 TIMES DAILY
Qty: 90 TABLET | Refills: 0 | OUTPATIENT
Start: 2024-05-13 | End: 2024-06-12

## 2024-06-08 DIAGNOSIS — F90.9 ATTENTION DEFICIT HYPERACTIVITY DISORDER (ADHD), UNSPECIFIED ADHD TYPE: ICD-10-CM

## 2024-06-08 RX ORDER — DEXTROAMPHETAMINE SACCHARATE, AMPHETAMINE ASPARTATE, DEXTROAMPHETAMINE SULFATE AND AMPHETAMINE SULFATE 5; 5; 5; 5 MG/1; MG/1; MG/1; MG/1
30 TABLET ORAL 2 TIMES DAILY
Qty: 90 TABLET | Refills: 0 | Status: CANCELLED | OUTPATIENT
Start: 2024-06-08 | End: 2024-07-08

## 2024-06-10 NOTE — TELEPHONE ENCOUNTER
Patient has been notified to contact pharmacy for Lorraine's Adderall prescription that was sent to her pharmacy on 4/3/24 with a start date of 6/2/4 via [employer]

## 2024-07-09 DIAGNOSIS — F90.9 ATTENTION DEFICIT HYPERACTIVITY DISORDER (ADHD), UNSPECIFIED ADHD TYPE: ICD-10-CM

## 2024-07-10 NOTE — TELEPHONE ENCOUNTER
[facility] last fill date for Adderall as 6/11/24 for a 30 d/s.      Last Visit: 12/7/23  Next Appointment: None  Previous Refill Encounter(s): Date: 6/2/24 #90  Controlled Substance Agreement: None  Urine Drug Screen: None     Requested Prescriptions     Pending Prescriptions Disp Refills    amphetamine-dextroamphetamine (ADDERALL) 20 MG tablet 90 tablet 0     Sig: Take 1.5 tablets by mouth 2 times daily for 30 days. Max Daily Amount: 60 mg

## 2024-07-15 DIAGNOSIS — F90.9 ATTENTION DEFICIT HYPERACTIVITY DISORDER (ADHD), UNSPECIFIED ADHD TYPE: ICD-10-CM

## 2024-07-16 RX ORDER — DEXTROAMPHETAMINE SACCHARATE, AMPHETAMINE ASPARTATE, DEXTROAMPHETAMINE SULFATE AND AMPHETAMINE SULFATE 5; 5; 5; 5 MG/1; MG/1; MG/1; MG/1
30 TABLET ORAL 2 TIMES DAILY
Qty: 90 TABLET | Refills: 0 | Status: SHIPPED | OUTPATIENT
Start: 2024-07-16 | End: 2024-08-15

## 2024-07-17 RX ORDER — DEXTROAMPHETAMINE SACCHARATE, AMPHETAMINE ASPARTATE, DEXTROAMPHETAMINE SULFATE AND AMPHETAMINE SULFATE 5; 5; 5; 5 MG/1; MG/1; MG/1; MG/1
30 TABLET ORAL 2 TIMES DAILY
Qty: 90 TABLET | Refills: 0 | OUTPATIENT
Start: 2024-07-17 | End: 2024-08-16

## 2024-08-15 DIAGNOSIS — F90.9 ATTENTION DEFICIT HYPERACTIVITY DISORDER (ADHD), UNSPECIFIED ADHD TYPE: ICD-10-CM

## 2024-08-19 DIAGNOSIS — F90.9 ATTENTION DEFICIT HYPERACTIVITY DISORDER (ADHD), UNSPECIFIED ADHD TYPE: ICD-10-CM

## 2024-08-21 DIAGNOSIS — F90.9 ATTENTION DEFICIT HYPERACTIVITY DISORDER (ADHD), UNSPECIFIED ADHD TYPE: ICD-10-CM

## 2024-08-21 NOTE — TELEPHONE ENCOUNTER
Medication name:amphetamine-dextroamphetamine (ADDERALL) 20 MG tablet     Last appointment:07/16/24    Next appointment:not scheduled    :07/16/24    [facility]

## 2024-08-22 RX ORDER — DEXTROAMPHETAMINE SACCHARATE, AMPHETAMINE ASPARTATE, DEXTROAMPHETAMINE SULFATE AND AMPHETAMINE SULFATE 5; 5; 5; 5 MG/1; MG/1; MG/1; MG/1
30 TABLET ORAL 2 TIMES DAILY
Qty: 90 TABLET | Refills: 0 | Status: SHIPPED | OUTPATIENT
Start: 2024-08-22 | End: 2024-09-21

## 2024-08-22 NOTE — TELEPHONE ENCOUNTER
Please advise patient her refill has been approved however, no further refills will be approved without an in office appointment.  Also her controlled substance contract also will have to be updated as well.  Thanks!

## 2024-08-23 ENCOUNTER — TELEMEDICINE (OUTPATIENT)
Facility: CLINIC | Age: 34
End: 2024-08-23
Payer: COMMERCIAL

## 2024-08-23 DIAGNOSIS — K59.00 CONSTIPATION, UNSPECIFIED CONSTIPATION TYPE: Primary | ICD-10-CM

## 2024-08-23 DIAGNOSIS — N92.6 MENSTRUAL CYCLE PROBLEM: ICD-10-CM

## 2024-08-23 DIAGNOSIS — E10.65 TYPE 1 DIABETES MELLITUS WITH HYPERGLYCEMIA (HCC): ICD-10-CM

## 2024-08-23 PROCEDURE — 99214 OFFICE O/P EST MOD 30 MIN: CPT | Performed by: NURSE PRACTITIONER

## 2024-08-23 PROCEDURE — 3046F HEMOGLOBIN A1C LEVEL >9.0%: CPT | Performed by: NURSE PRACTITIONER

## 2024-08-23 SDOH — ECONOMIC STABILITY: INCOME INSECURITY: HOW HARD IS IT FOR YOU TO PAY FOR THE VERY BASICS LIKE FOOD, HOUSING, MEDICAL CARE, AND HEATING?: NOT VERY HARD

## 2024-08-23 SDOH — ECONOMIC STABILITY: FOOD INSECURITY: WITHIN THE PAST 12 MONTHS, YOU WORRIED THAT YOUR FOOD WOULD RUN OUT BEFORE YOU GOT MONEY TO BUY MORE.: NEVER TRUE

## 2024-08-23 SDOH — ECONOMIC STABILITY: FOOD INSECURITY: WITHIN THE PAST 12 MONTHS, THE FOOD YOU BOUGHT JUST DIDN'T LAST AND YOU DIDN'T HAVE MONEY TO GET MORE.: NEVER TRUE

## 2024-08-27 ENCOUNTER — TELEPHONE (OUTPATIENT)
Facility: CLINIC | Age: 34
End: 2024-08-27

## 2024-08-27 NOTE — TELEPHONE ENCOUNTER
Left patient voicemail to contact office to schedule follow up appointment       Return in about 3 weeks (around 9/13/2024) for constipation, ADD, fasting labs 1 week prior, in office ONLY.

## 2024-08-28 RX ORDER — DEXTROAMPHETAMINE SACCHARATE, AMPHETAMINE ASPARTATE, DEXTROAMPHETAMINE SULFATE AND AMPHETAMINE SULFATE 5; 5; 5; 5 MG/1; MG/1; MG/1; MG/1
30 TABLET ORAL 2 TIMES DAILY
Qty: 90 TABLET | Refills: 0 | OUTPATIENT
Start: 2024-08-28 | End: 2024-09-27

## 2024-09-17 DIAGNOSIS — F90.9 ATTENTION DEFICIT HYPERACTIVITY DISORDER (ADHD), UNSPECIFIED ADHD TYPE: ICD-10-CM

## 2024-09-20 DIAGNOSIS — E10.65 TYPE 1 DIABETES MELLITUS WITH HYPERGLYCEMIA (HCC): ICD-10-CM

## 2024-09-20 DIAGNOSIS — F90.9 ATTENTION DEFICIT HYPERACTIVITY DISORDER (ADHD), UNSPECIFIED ADHD TYPE: ICD-10-CM

## 2024-09-20 RX ORDER — INSULIN GLARGINE 100 [IU]/ML
50 INJECTION, SOLUTION SUBCUTANEOUS NIGHTLY
Qty: 15 ML | Refills: 11 | Status: CANCELLED | OUTPATIENT
Start: 2024-09-20

## 2024-09-20 RX ORDER — INSULIN LISPRO 100 [IU]/ML
INJECTION, SOLUTION INTRAVENOUS; SUBCUTANEOUS
Qty: 5 ADJUSTABLE DOSE PRE-FILLED PEN SYRINGE | Refills: 11 | Status: CANCELLED | OUTPATIENT
Start: 2024-09-20

## 2024-09-20 RX ORDER — DEXTROAMPHETAMINE SACCHARATE, AMPHETAMINE ASPARTATE, DEXTROAMPHETAMINE SULFATE AND AMPHETAMINE SULFATE 5; 5; 5; 5 MG/1; MG/1; MG/1; MG/1
30 TABLET ORAL 2 TIMES DAILY
Qty: 90 TABLET | Refills: 0 | Status: CANCELLED | OUTPATIENT
Start: 2024-09-20 | End: 2024-10-20

## 2024-09-27 RX ORDER — DEXTROAMPHETAMINE SACCHARATE, AMPHETAMINE ASPARTATE, DEXTROAMPHETAMINE SULFATE AND AMPHETAMINE SULFATE 5; 5; 5; 5 MG/1; MG/1; MG/1; MG/1
30 TABLET ORAL 2 TIMES DAILY
Qty: 90 TABLET | Refills: 0 | Status: SHIPPED | OUTPATIENT
Start: 2024-09-27 | End: 2024-10-27

## 2024-10-16 PROBLEM — F90.0 ATTENTION DEFICIT HYPERACTIVITY DISORDER (ADHD), PREDOMINANTLY INATTENTIVE TYPE: Status: ACTIVE | Noted: 2024-10-16

## 2024-10-16 PROBLEM — K21.9 GASTRO-ESOPHAGEAL REFLUX DISEASE WITHOUT ESOPHAGITIS: Status: ACTIVE | Noted: 2024-10-16

## 2024-10-23 ENCOUNTER — TELEPHONE (OUTPATIENT)
Facility: CLINIC | Age: 34
End: 2024-10-23

## 2024-10-23 NOTE — TELEPHONE ENCOUNTER
Voicemail left for patient to contact office to reschedule appointment due to provider being out on PTO.

## 2024-10-26 DIAGNOSIS — F90.9 ATTENTION DEFICIT HYPERACTIVITY DISORDER (ADHD), UNSPECIFIED ADHD TYPE: ICD-10-CM

## 2024-10-26 RX ORDER — DEXTROAMPHETAMINE SACCHARATE, AMPHETAMINE ASPARTATE, DEXTROAMPHETAMINE SULFATE AND AMPHETAMINE SULFATE 5; 5; 5; 5 MG/1; MG/1; MG/1; MG/1
30 TABLET ORAL 2 TIMES DAILY
Qty: 90 TABLET | Refills: 0 | Status: CANCELLED | OUTPATIENT
Start: 2024-10-26 | End: 2024-11-25

## 2024-10-28 NOTE — TELEPHONE ENCOUNTER
Per NAYELY Lerner on 10/16/24 patient must be seen in the office only before any more prescriptions will be written.

## 2024-10-30 ENCOUNTER — OFFICE VISIT (OUTPATIENT)
Facility: CLINIC | Age: 34
End: 2024-10-30

## 2024-10-30 VITALS
HEIGHT: 62 IN | SYSTOLIC BLOOD PRESSURE: 118 MMHG | DIASTOLIC BLOOD PRESSURE: 66 MMHG | RESPIRATION RATE: 20 BRPM | OXYGEN SATURATION: 97 % | HEART RATE: 100 BPM | BODY MASS INDEX: 27.6 KG/M2 | WEIGHT: 150 LBS | TEMPERATURE: 98.1 F

## 2024-10-30 DIAGNOSIS — K59.00 CONSTIPATION, UNSPECIFIED CONSTIPATION TYPE: ICD-10-CM

## 2024-10-30 DIAGNOSIS — F90.9 ATTENTION DEFICIT HYPERACTIVITY DISORDER (ADHD), UNSPECIFIED ADHD TYPE: Primary | ICD-10-CM

## 2024-10-30 DIAGNOSIS — E10.65 TYPE 1 DIABETES MELLITUS WITH HYPERGLYCEMIA (HCC): ICD-10-CM

## 2024-10-30 LAB
AMPHETAMINE, URINE, POC: NEGATIVE
BARBITURATES, URINE, POC: NORMAL
BENZODIAZEPINES, URINE, POC: NORMAL
BUPRENORPHINE, URINE, POC: NEGATIVE
COCAINE, URINE, POC: NEGATIVE
CREATININE, URINE, POC: NORMAL
LOT EXP DATE, POC: NORMAL
Lab: NORMAL
MARIJUANA (THC), URINE, POC: NEGATIVE
METHADONE, URINE, POC: NEGATIVE
METHAMPHETAMINE, URINE, POC: NEGATIVE
OPIATES, URINE, POC: NEGATIVE
OXYCODONE, URINE, POC: NORMAL
PH, POC: NORMAL
PHENCYCLIDINE, URINE, POC: NEGATIVE
SPECIFIC GRAVITY, URINE, POC: NORMAL (ref 1–1.03)
TRICYCLICS, URINE, POC: NORMAL
VALID INTERNAL CONTROL, POC: YES

## 2024-10-30 RX ORDER — DEXTROAMPHETAMINE SACCHARATE, AMPHETAMINE ASPARTATE, DEXTROAMPHETAMINE SULFATE AND AMPHETAMINE SULFATE 5; 5; 5; 5 MG/1; MG/1; MG/1; MG/1
30 TABLET ORAL 2 TIMES DAILY
Qty: 90 TABLET | Refills: 0 | Status: SHIPPED | OUTPATIENT
Start: 2024-11-29 | End: 2024-12-29

## 2024-10-30 RX ORDER — DEXTROAMPHETAMINE SACCHARATE, AMPHETAMINE ASPARTATE, DEXTROAMPHETAMINE SULFATE AND AMPHETAMINE SULFATE 5; 5; 5; 5 MG/1; MG/1; MG/1; MG/1
30 TABLET ORAL 2 TIMES DAILY
Qty: 90 TABLET | Refills: 0 | Status: SHIPPED | OUTPATIENT
Start: 2024-12-29 | End: 2025-01-28

## 2024-10-30 RX ORDER — DEXTROAMPHETAMINE SACCHARATE, AMPHETAMINE ASPARTATE, DEXTROAMPHETAMINE SULFATE AND AMPHETAMINE SULFATE 5; 5; 5; 5 MG/1; MG/1; MG/1; MG/1
30 TABLET ORAL 2 TIMES DAILY
Qty: 90 TABLET | Refills: 0 | Status: SHIPPED | OUTPATIENT
Start: 2024-10-30 | End: 2024-11-29

## 2024-10-30 RX ORDER — LANCETS
EACH MISCELLANEOUS
COMMUNITY
Start: 2024-10-21

## 2024-10-30 RX ORDER — HYDROCHLOROTHIAZIDE 12.5 MG/1
CAPSULE ORAL
Qty: 2 EACH | Refills: 5 | Status: SHIPPED | OUTPATIENT
Start: 2024-10-30

## 2024-10-30 ASSESSMENT — PATIENT HEALTH QUESTIONNAIRE - PHQ9
SUM OF ALL RESPONSES TO PHQ QUESTIONS 1-9: 0
1. LITTLE INTEREST OR PLEASURE IN DOING THINGS: NOT AT ALL
SUM OF ALL RESPONSES TO PHQ9 QUESTIONS 1 & 2: 0
2. FEELING DOWN, DEPRESSED OR HOPELESS: NOT AT ALL
SUM OF ALL RESPONSES TO PHQ QUESTIONS 1-9: 0

## 2024-10-30 NOTE — PROGRESS NOTES
\"Have you been to the ER, urgent care clinic since your last visit?  Hospitalized since your last visit?\"    NO    “Have you seen or consulted any other health care providers outside our system since your last visit?”    YES - When: approximately 8 months ago.  Where and Why: Endocrinology.      “Have you had a diabetic eye exam?”    NO     Date of last diabetic eye exam: 6/28/2023            
high during and after her period. She admits to not taking the best care of herself recently. She takes a maximum of 30 units of Humalog with meals and also takes 50 units of Lantus. Despite these measures, her blood sugar can still be high a few hours after eating. She takes Humalog 3 to 6 times a day, depending on her blood sugar levels. She acknowledges that her diet, which includes a lot of carbs and junk food, may be contributing to her high blood sugar. She does not consume alcohol and has never experienced low blood sugar. She has not tried Tresiba and is interested in using OmniPod, but her doctor has not approved it.    Review of Systems   Constitutional:  Positive for unexpected weight change (gain). Negative for chills, fatigue and fever.   Respiratory:  Negative for chest tightness and shortness of breath.    Cardiovascular:  Negative for chest pain and palpitations.   Gastrointestinal:  Positive for constipation.   Neurological:  Negative for dizziness and headaches.     Current Outpatient Medications   Medication Sig Dispense Refill    amphetamine-dextroamphetamine (ADDERALL) 20 MG tablet Take 1.5 tablets by mouth 2 times daily for 30 days. Max Daily Amount: 60 mg 90 tablet 0    omeprazole (PRILOSEC) 20 MG delayed release capsule Take 1 capsule by mouth every morning (before breakfast) 90 capsule 2    insulin glargine (LANTUS SOLOSTAR) 100 UNIT/ML injection pen Inject 50 Units into the skin nightly 15 mL 11    Insulin Pen Needle 32G X 4 MM MISC 1 each by Does not apply route daily To use once daily with lantus solostar 100 each 3    insulin lispro, 1 Unit Dial, (HUMALOG KWIKPEN) 100 UNIT/ML SOPN INJECT AS INSTRUCTED (MAX OF 50 UNITS PER DAY) 5 Adjustable Dose Pre-filled Pen Syringe 11     No current facility-administered medications for this visit.      Objective   Blood pressure 118/66, pulse 100, temperature 98.1 °F (36.7 °C), temperature source Temporal, resp. rate 20, height 1.575 m (5' 2\"), weight

## 2024-11-26 ENCOUNTER — TELEPHONE (OUTPATIENT)
Age: 34
End: 2024-11-26

## 2024-11-26 DIAGNOSIS — E10.65 TYPE 1 DIABETES MELLITUS WITH HYPERGLYCEMIA (HCC): ICD-10-CM

## 2024-11-26 DIAGNOSIS — F90.9 ATTENTION DEFICIT HYPERACTIVITY DISORDER (ADHD), UNSPECIFIED ADHD TYPE: ICD-10-CM

## 2024-11-26 RX ORDER — DEXTROAMPHETAMINE SACCHARATE, AMPHETAMINE ASPARTATE, DEXTROAMPHETAMINE SULFATE AND AMPHETAMINE SULFATE 5; 5; 5; 5 MG/1; MG/1; MG/1; MG/1
30 TABLET ORAL 2 TIMES DAILY
Qty: 90 TABLET | Refills: 0 | Status: CANCELLED | OUTPATIENT
Start: 2024-11-26 | End: 2024-12-26

## 2024-11-27 NOTE — TELEPHONE ENCOUNTER
[facility] Adderall as 10/30/24  for a 30 d/s.      Last Visit: 10/30/24  Next Appointment: 10/30/24  Previous Refill Encounter(s): Date: 10/30/24 #90  Controlled Substance Agreement: 10/30/24  Urine Drug Screen: 10/30/24    Previous Refill Encounter(s) Lantus: Date: 2/25/24 Refills 11  Previous Refill Encounter(s) Freestyle Pancho: Date: 10/30/24 #5- Patient notified to contact Pharmacy      Requested Prescriptions     Pending Prescriptions Disp Refills    insulin glargine (LANTUS SOLOSTAR) 100 UNIT/ML injection pen 15 mL 11     Sig: Inject 50 Units into the skin nightly    Continuous Glucose Sensor (FREESTYLE PANCHO 3 PLUS SENSOR) MISC 2 each 5     Sig: Use as directed    amphetamine-dextroamphetamine (ADDERALL) 20 MG tablet 90 tablet 0     Sig: Take 1.5 tablets by mouth 2 times daily for 30 days. Max Daily Amount: 60 mg

## 2024-12-09 ENCOUNTER — TELEPHONE (OUTPATIENT)
Dept: PHARMACY | Facility: CLINIC | Age: 34
End: 2024-12-09

## 2024-12-09 NOTE — TELEPHONE ENCOUNTER
Reason for referral: DM  Referring provider: Malinda Lerner   Referring provider office: [facility]  Referred to: Franklyn Britton, PharmD, BCACP, BC-ADM  Status of Patient: New Patient  Length of Appt: 60 minutes  Type of Appt: In Person   Patient need address: No

## 2024-12-09 NOTE — TELEPHONE ENCOUNTER
**[facility]**    Attempt made to contact patient at the home number.    Left a message requesting a call back at [phone] to schedule the appointment      [facility]   Ambulatory Pharmacy Clinical   [phone]  Department, toll free: [phone], option 2

## 2024-12-10 DIAGNOSIS — E10.65 TYPE 1 DIABETES MELLITUS WITH HYPERGLYCEMIA (HCC): ICD-10-CM

## 2024-12-10 RX ORDER — INSULIN GLARGINE 100 [IU]/ML
50 INJECTION, SOLUTION SUBCUTANEOUS NIGHTLY
Qty: 15 ML | Refills: 11 | Status: CANCELLED | OUTPATIENT
Start: 2024-12-10

## 2024-12-10 NOTE — TELEPHONE ENCOUNTER
**[facility] is to [facility] with [facility]**    Second Attempt made to contact patient at the home number.    Left a message requesting a call back at [phone] to schedule the appointment      [facility]   Ambulatory Pharmacy Clinical   [phone]  Department, toll free: [phone], option 2

## 2024-12-11 NOTE — TELEPHONE ENCOUNTER
**[facility] is to [facility] with [facility]**    Third Attempt made to contact patient at the home number.    Left a message requesting a call back at [phone] to schedule the appointment    *Letter Sent*    [facility]   Ambulatory Pharmacy Clinical   [phone]  Department, toll free: [phone], option 2       For Pharmacy Admin Tracking Only    Program: Medical Group  Gap Closed?: No   Time Spent (min): 5

## 2024-12-13 RX ORDER — INSULIN GLARGINE 100 [IU]/ML
50 INJECTION, SOLUTION SUBCUTANEOUS NIGHTLY
Qty: 15 ML | Refills: 11 | Status: SHIPPED | OUTPATIENT
Start: 2024-12-13

## 2024-12-13 RX ORDER — HYDROCHLOROTHIAZIDE 12.5 MG/1
CAPSULE ORAL
Qty: 2 EACH | Refills: 5 | Status: SHIPPED | OUTPATIENT
Start: 2024-12-13

## 2024-12-13 NOTE — TELEPHONE ENCOUNTER
Attempted call to schedule a PharmD appt if she has not seen her endocrinologist, no answer, lvm to contact office

## 2025-01-02 DIAGNOSIS — E10.65 TYPE 1 DIABETES MELLITUS WITH HYPERGLYCEMIA (HCC): ICD-10-CM

## 2025-01-02 DIAGNOSIS — F90.9 ATTENTION DEFICIT HYPERACTIVITY DISORDER (ADHD), UNSPECIFIED ADHD TYPE: ICD-10-CM

## 2025-01-02 RX ORDER — DEXTROAMPHETAMINE SACCHARATE, AMPHETAMINE ASPARTATE, DEXTROAMPHETAMINE SULFATE AND AMPHETAMINE SULFATE 5; 5; 5; 5 MG/1; MG/1; MG/1; MG/1
30 TABLET ORAL 2 TIMES DAILY
Qty: 90 TABLET | Refills: 0 | Status: CANCELLED | OUTPATIENT
Start: 2025-01-02 | End: 2025-02-01

## 2025-01-07 NOTE — TELEPHONE ENCOUNTER
[facility]derall as 1/2/24 for a 30 d/s.      Last Visit: 10/30/24  Next Appointment: 1/30/25  Previous Refill Encounter(s) Adderall: Date: 12/29/24 #90  Previous Refill Encounter(s) Freestyle Pancho Sensor: Date: 12/13/24 #2  Controlled Substance Agreement: 10/30/24  Urine Drug Screen: 10/30/24    Requested Prescriptions     Pending Prescriptions Disp Refills    amphetamine-dextroamphetamine (ADDERALL) 20 MG tablet 90 tablet 0     Sig: Take 1.5 tablets by mouth 2 times daily for 30 days. Max Daily Amount: 60 mg    Continuous Glucose Sensor (FREESTYLE PANCHO 3 PLUS SENSOR) MISC 2 each 5     Sig: Use as directed

## 2025-01-14 RX ORDER — HYDROCHLOROTHIAZIDE 12.5 MG/1
CAPSULE ORAL
Qty: 2 EACH | Refills: 5 | Status: SHIPPED | OUTPATIENT
Start: 2025-01-14

## 2025-01-29 DIAGNOSIS — F90.9 ATTENTION DEFICIT HYPERACTIVITY DISORDER (ADHD), UNSPECIFIED ADHD TYPE: ICD-10-CM

## 2025-01-29 RX ORDER — DEXTROAMPHETAMINE SACCHARATE, AMPHETAMINE ASPARTATE, DEXTROAMPHETAMINE SULFATE AND AMPHETAMINE SULFATE 5; 5; 5; 5 MG/1; MG/1; MG/1; MG/1
30 TABLET ORAL 2 TIMES DAILY
Qty: 90 TABLET | Refills: 0 | Status: CANCELLED | OUTPATIENT
Start: 2025-01-29 | End: 2025-02-28

## 2025-01-29 RX ORDER — INSULIN LISPRO 100 [IU]/ML
INJECTION, SOLUTION INTRAVENOUS; SUBCUTANEOUS
Qty: 5 ADJUSTABLE DOSE PRE-FILLED PEN SYRINGE | Refills: 11 | Status: CANCELLED | OUTPATIENT
Start: 2025-01-29

## 2025-01-30 ENCOUNTER — TELEMEDICINE (OUTPATIENT)
Facility: CLINIC | Age: 35
End: 2025-01-30
Payer: COMMERCIAL

## 2025-01-30 DIAGNOSIS — E10.65 TYPE 1 DIABETES MELLITUS WITH HYPERGLYCEMIA (HCC): ICD-10-CM

## 2025-01-30 DIAGNOSIS — F90.9 ATTENTION DEFICIT HYPERACTIVITY DISORDER (ADHD), UNSPECIFIED ADHD TYPE: ICD-10-CM

## 2025-01-30 DIAGNOSIS — J11.1 INFLUENZA: Primary | ICD-10-CM

## 2025-01-30 PROCEDURE — 99214 OFFICE O/P EST MOD 30 MIN: CPT | Performed by: NURSE PRACTITIONER

## 2025-01-30 RX ORDER — DEXTROAMPHETAMINE SACCHARATE, AMPHETAMINE ASPARTATE, DEXTROAMPHETAMINE SULFATE AND AMPHETAMINE SULFATE 5; 5; 5; 5 MG/1; MG/1; MG/1; MG/1
20 TABLET ORAL DAILY
Qty: 30 TABLET | Refills: 0 | Status: SHIPPED | OUTPATIENT
Start: 2025-03-31 | End: 2025-04-30

## 2025-01-30 RX ORDER — INSULIN LISPRO 100 [IU]/ML
INJECTION, SOLUTION INTRAVENOUS; SUBCUTANEOUS
Qty: 5 ADJUSTABLE DOSE PRE-FILLED PEN SYRINGE | Refills: 11 | Status: SHIPPED | OUTPATIENT
Start: 2025-01-30

## 2025-01-30 RX ORDER — DEXTROAMPHETAMINE SACCHARATE, AMPHETAMINE ASPARTATE, DEXTROAMPHETAMINE SULFATE AND AMPHETAMINE SULFATE 5; 5; 5; 5 MG/1; MG/1; MG/1; MG/1
20 TABLET ORAL DAILY
Qty: 30 TABLET | Refills: 0 | Status: SHIPPED | OUTPATIENT
Start: 2025-03-01 | End: 2025-03-31

## 2025-01-30 RX ORDER — DEXTROAMPHETAMINE SACCHARATE, AMPHETAMINE ASPARTATE, DEXTROAMPHETAMINE SULFATE AND AMPHETAMINE SULFATE 5; 5; 5; 5 MG/1; MG/1; MG/1; MG/1
20 TABLET ORAL DAILY
Qty: 30 TABLET | Refills: 0 | Status: SHIPPED | OUTPATIENT
Start: 2025-01-30 | End: 2025-03-01

## 2025-01-30 RX ORDER — HYDROCHLOROTHIAZIDE 12.5 MG/1
CAPSULE ORAL
Qty: 3 EACH | Refills: 11 | Status: SHIPPED | OUTPATIENT
Start: 2025-01-30

## 2025-01-30 SDOH — ECONOMIC STABILITY: FOOD INSECURITY: WITHIN THE PAST 12 MONTHS, YOU WORRIED THAT YOUR FOOD WOULD RUN OUT BEFORE YOU GOT MONEY TO BUY MORE.: NEVER TRUE

## 2025-01-30 SDOH — ECONOMIC STABILITY: FOOD INSECURITY: WITHIN THE PAST 12 MONTHS, THE FOOD YOU BOUGHT JUST DIDN'T LAST AND YOU DIDN'T HAVE MONEY TO GET MORE.: NEVER TRUE

## 2025-01-30 ASSESSMENT — PATIENT HEALTH QUESTIONNAIRE - PHQ9
SUM OF ALL RESPONSES TO PHQ9 QUESTIONS 1 & 2: 0
2. FEELING DOWN, DEPRESSED OR HOPELESS: NOT AT ALL
1. LITTLE INTEREST OR PLEASURE IN DOING THINGS: NOT AT ALL
SUM OF ALL RESPONSES TO PHQ QUESTIONS 1-9: 0

## 2025-01-30 NOTE — PROGRESS NOTES
Kaylan Stewart is a 34 y.o. (1990) female is a Established patient, presents alone, who was seen by synchronous (real-time) audio-video technology on 1/30/2025 for evaluation of the following:   Chief Complaint   Patient presents with    ADD   History obtained from patient.    Assessment & Plan:     Assessment & Plan  1. Attention Deficit Disorder (ADD).  A prescription for Adderall has been renewed for a duration of 3 months.    2. Diabetes Mellitus.  She is advised to consult with endocrinology for further management.  An appointment with Pharm D will be made to assist with glycemic control until appointment with endocrinology.  A prescription for Humalog has been issued. Additionally, a new prescription for the FreeStyle Pancho 3 plus system has been provided. She is instructed to contact the number on the FreeStyle Pancho box to request a replacement sensor for her sensors that malfunctioned.    3. Influenza.  A work note has been issued, excusing her from work until Monday.    Influenza  Comments:  positive home conducted test Tuesday  Type 1 diabetes mellitus with hyperglycemia (HCC)  Assessment & Plan:   Chronic, not at goal (unstable), referred back to endocrinology and will refer to Pharm D to assist with glycemic control until she is able endocrinology.  Orders:  -     Continuous Glucose Sensor (FREESTYLE PANCHO 3 PLUS SENSOR) MISC; Use as directed, Disp-3 each, R-11Normal  Attention deficit hyperactivity disorder (ADHD), unspecified ADHD type  -     amphetamine-dextroamphetamine (ADDERALL) 20 MG tablet; Take 1 tablet by mouth daily for 30 days. Max Daily Amount: 20 mg, Disp-30 tablet, R-0Normal  -     amphetamine-dextroamphetamine (ADDERALL) 20 MG tablet; Take 1 tablet by mouth daily for 30 days. Max Daily Amount: 20 mg, Disp-30 tablet, R-0Normal  -     amphetamine-dextroamphetamine (ADDERALL) 20 MG tablet; Take 1 tablet by mouth daily for 30 days. Max Daily Amount: 20 mg, Disp-30 tablet,

## 2025-01-30 NOTE — ASSESSMENT & PLAN NOTE
Chronic, not at goal (unstable), referred back to endocrinology and will refer to Pharm D to assist with glycemic control until she is able endocrinology.

## 2025-01-30 NOTE — PROGRESS NOTES
Kaylan Stewart, was evaluated through a synchronous (real-time) audio-video encounter. The patient (or guardian if applicable) is aware that this is a billable service, which includes applicable co-pays. This Virtual Visit was conducted with patient's (and/or legal guardian's) consent. Patient identification was verified, and a caregiver was present when appropriate.   The patient was located at Home: [address] [place] [id number]  Provider was located at Facility (Appt Dept): [address]  [place] [address]  Confirm you are appropriately licensed, registered, or certified to deliver care in the [facility] where the patient is located as indicated above. If you are not or unsure, please re-schedule the visit: Yes, I confirm.     Kaylan Stewart (:  1990) is a Established patient, presenting virtually for evaluation of the following:      Below is the assessment and plan developed based on review of pertinent history, physical exam, labs, studies, and medications.         --Michael Hill

## 2025-01-31 ENCOUNTER — TELEPHONE (OUTPATIENT)
Facility: CLINIC | Age: 35
End: 2025-01-31

## 2025-01-31 NOTE — TELEPHONE ENCOUNTER
Patient called requesting to speak to nurse or her provider. She stated the dosage for her Adderral was written incorrectly. She stated her last prescription was written for 1.5 tablets by mouth twice daily but the recent prescription was written for 1 tablet by mouth daily for 30 day daily. Also she stated she only received 30 tablets instead of the 90 tablets she was prescribed before and wanted to know if the correct dosage and remaining amount of tablets could be sent to pharmacy.

## 2025-01-31 NOTE — TELEPHONE ENCOUNTER
Patient requesting call from nurse states medication dosage is written incorrectly states  last prescription written 1.5 tablets by mouth 2 times daily. Also Requesting 90 tablets.

## 2025-02-03 DIAGNOSIS — F90.9 ATTENTION DEFICIT HYPERACTIVITY DISORDER (ADHD), UNSPECIFIED ADHD TYPE: Primary | ICD-10-CM

## 2025-02-03 RX ORDER — DEXTROAMPHETAMINE SACCHARATE, AMPHETAMINE ASPARTATE, DEXTROAMPHETAMINE SULFATE AND AMPHETAMINE SULFATE 5; 5; 5; 5 MG/1; MG/1; MG/1; MG/1
30 TABLET ORAL 2 TIMES DAILY
Qty: 90 TABLET | Refills: 0 | Status: SHIPPED | OUTPATIENT
Start: 2025-03-05 | End: 2025-04-04

## 2025-02-03 RX ORDER — DEXTROAMPHETAMINE SACCHARATE, AMPHETAMINE ASPARTATE, DEXTROAMPHETAMINE SULFATE AND AMPHETAMINE SULFATE 5; 5; 5; 5 MG/1; MG/1; MG/1; MG/1
30 TABLET ORAL 2 TIMES DAILY
Qty: 90 TABLET | Refills: 0 | Status: SHIPPED | OUTPATIENT
Start: 2025-04-04 | End: 2025-05-04

## 2025-02-03 RX ORDER — DEXTROAMPHETAMINE SACCHARATE, AMPHETAMINE ASPARTATE, DEXTROAMPHETAMINE SULFATE AND AMPHETAMINE SULFATE 5; 5; 5; 5 MG/1; MG/1; MG/1; MG/1
30 TABLET ORAL 2 TIMES DAILY
Qty: 60 TABLET | Refills: 0 | Status: SHIPPED | OUTPATIENT
Start: 2025-02-03 | End: 2025-02-23

## 2025-02-04 ENCOUNTER — TRANSCRIBE ORDERS (OUTPATIENT)
Facility: HOSPITAL | Age: 35
End: 2025-02-04

## 2025-02-04 DIAGNOSIS — R63.5 WEIGHT GAIN: ICD-10-CM

## 2025-02-04 DIAGNOSIS — R11.0 CHRONIC NAUSEA: ICD-10-CM

## 2025-02-04 DIAGNOSIS — K59.00 CONSTIPATION, UNSPECIFIED CONSTIPATION TYPE: Primary | ICD-10-CM

## 2025-02-04 DIAGNOSIS — K21.9 GASTROESOPHAGEAL REFLUX DISEASE, UNSPECIFIED WHETHER ESOPHAGITIS PRESENT: ICD-10-CM

## 2025-02-04 DIAGNOSIS — R10.84 ABDOMINAL PAIN, GENERALIZED: ICD-10-CM

## 2025-02-04 DIAGNOSIS — K62.5 RECTAL BLEEDING: ICD-10-CM

## 2025-02-04 DIAGNOSIS — E10.9 INSULIN DEPENDENT DIABETES MELLITUS TYPE IA (HCC): ICD-10-CM

## 2025-02-04 DIAGNOSIS — R14.0 ABDOMINAL BLOATING: ICD-10-CM

## 2025-02-17 ENCOUNTER — HOSPITAL ENCOUNTER (OUTPATIENT)
Facility: HOSPITAL | Age: 35
Setting detail: SPECIMEN
Discharge: HOME | End: 2025-02-20
Payer: COMMERCIAL

## 2025-02-17 DIAGNOSIS — E10.65 TYPE 1 DIABETES MELLITUS WITH HYPERGLYCEMIA (HCC): ICD-10-CM

## 2025-02-17 LAB
25(OH)D3 SERPL-MCNC: 8.7 NG/ML (ref 30–100)
ALBUMIN SERPL-MCNC: 4 G/DL (ref 3.4–5)
ALBUMIN/GLOB SERPL: 1.1 (ref 0.8–1.7)
ALP SERPL-CCNC: 53 U/L (ref 45–117)
ALT SERPL-CCNC: 16 U/L (ref 13–56)
ANION GAP SERPL CALC-SCNC: 10 MMOL/L (ref 3–18)
AST SERPL-CCNC: 8 U/L (ref 10–38)
BILIRUB SERPL-MCNC: 0.4 MG/DL (ref 0.2–1)
BUN SERPL-MCNC: 13 MG/DL (ref 7–18)
BUN/CREAT SERPL: 16 (ref 12–20)
CALCIUM SERPL-MCNC: 9.6 MG/DL (ref 8.5–10.1)
CHLORIDE SERPL-SCNC: 107 MMOL/L (ref 100–111)
CHOLEST SERPL-MCNC: 241 MG/DL
CO2 SERPL-SCNC: 20 MMOL/L (ref 21–32)
CREAT SERPL-MCNC: 0.82 MG/DL (ref 0.6–1.3)
EST. AVERAGE GLUCOSE BLD GHB EST-MCNC: 258 MG/DL
GLOBULIN SER CALC-MCNC: 3.6 G/DL (ref 2–4)
GLUCOSE SERPL-MCNC: 153 MG/DL (ref 74–99)
HBA1C MFR BLD: 10.6 % (ref 4.2–5.6)
HDLC SERPL-MCNC: 69 MG/DL (ref 40–60)
HDLC SERPL: 3.5 (ref 0–5)
LDLC SERPL CALC-MCNC: 142.2 MG/DL (ref 0–100)
LIPID PANEL: ABNORMAL
POTASSIUM SERPL-SCNC: 4.3 MMOL/L (ref 3.5–5.5)
PROT SERPL-MCNC: 7.6 G/DL (ref 6.4–8.2)
SODIUM SERPL-SCNC: 137 MMOL/L (ref 136–145)
TRIGL SERPL-MCNC: 149 MG/DL
VLDLC SERPL CALC-MCNC: 29.8 MG/DL

## 2025-02-17 PROCEDURE — 83036 HEMOGLOBIN GLYCOSYLATED A1C: CPT

## 2025-02-17 PROCEDURE — 80061 LIPID PANEL: CPT

## 2025-02-17 PROCEDURE — 80053 COMPREHEN METABOLIC PANEL: CPT

## 2025-02-17 PROCEDURE — 82306 VITAMIN D 25 HYDROXY: CPT

## 2025-02-26 NOTE — PROGRESS NOTES
Pharmacy Progress Note - Diabetes Management       Assessment / Plan:   Diabetes Management:  Per ADA guidelines, Pt's A1c is not at goal of < 7%.  Pt's AGP report from the past 14 days shows time in target range 12%, average glucose 280 mg/dL, and GMI 10%.  She is highly nonadherent to both Lantus and Humalog.  She is injecting her Humalog when not eating leading to some hypoglycemia.  Stressed the importance of adherence to her insulin regimen.  Will adjust both basal and prandial insulin at this time.  For her poor basal control, will increase her Lantus to 60 units qam and have her titrate by 2 units every 2-3 days until FBG < 130 mg/dL.  Of note, this dose is much higher than 0.5 units/kg which is associated with overbasalization.  However, she clearly has high insulin resistance to already be on such a high amount of insulin without adequate glycemic control.  Will switch her to a Humalog to a CIR with CF.  Will have her CIR 1:5 and CF 1:25 > 150 mg/dL max 9 units > 350 mg/dL.  Will reassess with CGM data in 2 weeks.  After her insulin regimen is more consistent, will start pt on Omnipod 5.    Possible Hypothyroidism:  Pt with classic sx of hypothyroidism.  Autoimmune disorders like T1DM increase the risk of other autoimmune disorders.  Will check thyroid antibodies and hormone levels to assess.    Vitamin D Deficiency:  Deficient.  Will start Vit D 50,000 units weekly x12 weeks.  Will reassess with Vit D level upon completion.    Hyperlipidemia:  The ASCVD Risk score (Richardson DK, et al., 2019) failed to calculate for the following reasons:    The 2019 ASCVD risk score is only valid for ages 40 to 79 based on parameters listed. Current lipid treatment guidelines recommend at least moderate-intensity statin doses for all patients with diabetes to decrease overall ASCVD risk. Patient currently qualifies for a moderate intensity statin therapy based on current recommendations and is not currently taking a statin.

## 2025-02-27 ENCOUNTER — HOSPITAL ENCOUNTER (OUTPATIENT)
Facility: HOSPITAL | Age: 35
Setting detail: SPECIMEN
Discharge: HOME | End: 2025-02-27
Payer: COMMERCIAL

## 2025-02-27 ENCOUNTER — PHARMACY VISIT (OUTPATIENT)
Facility: CLINIC | Age: 35
End: 2025-02-27

## 2025-02-27 DIAGNOSIS — Z13.29 ENCOUNTER FOR SCREENING FOR OTHER SUSPECTED ENDOCRINE DISORDER: ICD-10-CM

## 2025-02-27 DIAGNOSIS — E10.65 TYPE 1 DIABETES MELLITUS WITH HYPERGLYCEMIA (HCC): Primary | ICD-10-CM

## 2025-02-27 PROCEDURE — 36415 COLL VENOUS BLD VENIPUNCTURE: CPT

## 2025-02-27 PROCEDURE — 86800 THYROGLOBULIN ANTIBODY: CPT

## 2025-02-27 PROCEDURE — 86376 MICROSOMAL ANTIBODY EACH: CPT

## 2025-02-27 PROCEDURE — 84443 ASSAY THYROID STIM HORMONE: CPT

## 2025-02-27 RX ORDER — ERGOCALCIFEROL 1.25 MG/1
50000 CAPSULE, LIQUID FILLED ORAL WEEKLY
Qty: 12 CAPSULE | Refills: 3 | Status: SHIPPED | OUTPATIENT
Start: 2025-02-27

## 2025-02-27 RX ORDER — INSULIN GLARGINE 100 [IU]/ML
60 INJECTION, SOLUTION SUBCUTANEOUS NIGHTLY
Qty: 15 ML | Refills: 11 | Status: SHIPPED | OUTPATIENT
Start: 2025-02-27

## 2025-02-27 NOTE — PATIENT INSTRUCTIONS
Your Visit Summary:     Plan:  - Increase Lantus to 60 units every MORNING   * Increase by 2 units every 2-3 days until your FASTING blood glucose is < 130 mg/dL consistently    - Change Humalog dosing:   * Carb Ratio of 1 unit for every 5 grams of carbs     * Correction Factor:   < 150: 0 units  150-174: 1 unit  175-199: 2 units  200-224: 3 units  225-249: 4 units  250-274: 5 units  275-299: 6 units  300-324: 7 units  325-349: 8 units  >350: 9 units    - Start Vitamin D 50,000 units every week    For any questions, please call [phone] or your PCP office.    Check and document your blood sugar first thing in the morning (fasting at least 8 hours), 2 hours after a meal, and/or before bedtime.   Bring your meter/log to all future visits.     Your blood sugar goals:  - Fasting (first thing in the morning)  blood sugar: 80 - 130mg/dL  - 2 hours after a meal: 80 - 180mg/dL    When you experience symptoms of low blood sugar (example: less than 70):  - Confirm low reading by checking your blood sugar.   - Then treat with 15 grams of carbohydrates (one-half cup of juice or regular soda, or 4-5 glucose tablets).  - Wait 15 minutes to recheck blood sugar.   - Then eat a protein containing meal/snack to prevent another low blood sugar episode. (example: peanut butter + crackers)    Nutrition:  - When reviewing a nutrition label, focus on the serving size, total calories, fat (and type of fats), total carbohydrates, sugar (and amount of added sugar), amount of fiber (good for your digestive), and amount of protein.  Refer to your nutrition label guide for more information.  - For a meal : max 45 - 60 grams of carbohydrates  - For a snack: max 15 grams of carbohydrates  - Reduce amount of saturated and trans fat.  Consider more unsaturated fat options as they are better for your heart health.   - Have at least 1 serving of lean fat protein with each meal.    - Increase fiber intake slowly to prevent constipation.   -

## 2025-02-28 ENCOUNTER — HOSPITAL ENCOUNTER (OUTPATIENT)
Facility: HOSPITAL | Age: 35
Discharge: HOME | End: 2025-03-03

## 2025-02-28 DIAGNOSIS — E10.9 INSULIN DEPENDENT DIABETES MELLITUS TYPE IA (HCC): ICD-10-CM

## 2025-02-28 DIAGNOSIS — K62.5 RECTAL BLEEDING: ICD-10-CM

## 2025-02-28 DIAGNOSIS — K21.9 GASTROESOPHAGEAL REFLUX DISEASE, UNSPECIFIED WHETHER ESOPHAGITIS PRESENT: ICD-10-CM

## 2025-02-28 DIAGNOSIS — R11.0 CHRONIC NAUSEA: ICD-10-CM

## 2025-02-28 DIAGNOSIS — R14.0 ABDOMINAL BLOATING: ICD-10-CM

## 2025-02-28 DIAGNOSIS — R10.84 ABDOMINAL PAIN, GENERALIZED: ICD-10-CM

## 2025-02-28 DIAGNOSIS — K59.00 CONSTIPATION, UNSPECIFIED CONSTIPATION TYPE: ICD-10-CM

## 2025-02-28 DIAGNOSIS — R63.5 WEIGHT GAIN: ICD-10-CM

## 2025-02-28 RX ORDER — IOPAMIDOL 612 MG/ML
100 INJECTION, SOLUTION INTRAVASCULAR
Status: DISCONTINUED | OUTPATIENT
Start: 2025-02-28 | End: 2025-03-03

## 2025-03-01 LAB
THYROGLOB AB SERPL-ACNC: <1 IU/ML (ref 0–0.9)
THYROPEROXIDASE AB SERPL-ACNC: 17 IU/ML (ref 0–34)
TSH SERPL DL<=0.05 MIU/L-ACNC: 1.09 UIU/ML (ref 0.45–4.5)

## 2025-03-03 ENCOUNTER — HOSPITAL ENCOUNTER (OUTPATIENT)
Facility: HOSPITAL | Age: 35
Discharge: HOME | End: 2025-03-06
Payer: COMMERCIAL

## 2025-03-03 PROCEDURE — 74177 CT ABD & PELVIS W/CONTRAST: CPT

## 2025-03-03 PROCEDURE — 6360000004 HC RX CONTRAST MEDICATION: Performed by: PHYSICIAN ASSISTANT

## 2025-03-03 RX ORDER — IODIXANOL 320 MG/ML
100 INJECTION, SOLUTION INTRAVASCULAR
Status: COMPLETED | OUTPATIENT
Start: 2025-03-03 | End: 2025-03-03

## 2025-03-03 RX ADMIN — IODIXANOL 100 ML: 320 INJECTION, SOLUTION INTRAVASCULAR at 18:44

## 2025-03-10 ENCOUNTER — TELEPHONE (OUTPATIENT)
Dept: PHARMACY | Facility: CLINIC | Age: 35
End: 2025-03-10

## 2025-03-11 ENCOUNTER — TELEPHONE (OUTPATIENT)
Facility: CLINIC | Age: 35
End: 2025-03-11

## 2025-03-11 NOTE — TELEPHONE ENCOUNTER
Medication  amphetamine-dextroamphetamine (ADDERALL) 20 MG tablet [[id number]]        [facility] does not have patient's medication in stock and is not sure when they will have it. Patient is requesting if she can have her medication sent to     [facility], [place] [address]

## 2025-03-11 NOTE — TELEPHONE ENCOUNTER
Patient needs to reschedule upcoming MyChart appointment with Franklyn Britton on 3/13/25 at 3:30 pm     Can be moved to 12:30/same day if that works    LVM to return my call at [phone]    [facility]   Ambulatory Pharmacy Clinical   [phone]  Department, toll free: [phone], option 2   
Second attempt    Patient needs to reschedule upcoming MyChart appointment with Franklyn Britton on 3/13/25 at 3:30 pm     Can be moved to 12:30/same day if that works    LVM to return my call at [phone]    [facility]   Ambulatory Pharmacy Clinical   [phone]  Department, toll free: [phone], option 2   
Render Risk Assessment In Note?: no
Detail Level: Detailed
Comment: Significant family history of acne, brother, and dad ( accutane).  with numerous old acne scars, and moderate papular acne.  I suggestd we consider oracea and aklief. Discussed hormonal therapy briefly

## 2025-03-12 NOTE — TELEPHONE ENCOUNTER
Pt called to check on status of prescription being sent to another pharmacy. States she has been out for weeks now.

## 2025-03-12 NOTE — PROGRESS NOTES
Pharmacy Progress Note - Diabetes Management       Assessment / Plan:   Diabetes Management:  Per ADA guidelines, Pt's A1c is not at goal of < 7%.  Pt's AGP report from the past 14 days shows time in target range 24%, average glucose 227 mg/dL, and GMI 8.7%.  Pt's basal control is difficult to assess given the severe lack of adherence to her Humalog dosing.  Her unopposed corrections are causing abrupt hypoglycemia.  When she is correctly calculating her carbs, she still has large post-prandial elevations.  Will intensify her CIR to 1:4.  Will maintain her CF 1:25 > 150 mg/dL max 9 units > 350 mg/dL.  Will reassess with CGM data in 3 weeks.  After her insulin regimen is more consistent, will start pt on Omnipod 5.     Nutrition/Lifestyle Modifications:  - Educated pt on the importance of moderating carbohydrate intake. Reviewed sources of carbohydrates and method to help determine appropriate portion sizes (e.g., Diabetes Plate Method).  - Advised patient to avoid sugar-sweetened beverages and replace with water or diet/zero sugar option.  - Recommend ~30 minutes consistent, moderately intensive, exercise/day or ~150 minutes/week. Start small, stay consistent, and increase length and types of exercise, as tolerated.       Patient will return to clinic in 3 week(s) for follow up.        S/O: Ms. Kaylan Stewart, a 35 y.o. female referred by Malinda Lerner APRN - NP,  has a past medical history of ADHD (attention deficit hyperactivity disorder), Diabetes mellitus type 1 (HCC), and Endometriosis.  Pt was seen today virtually via MyChart video visit for diabetes management.  Patient's last A1c was:   Hemoglobin A1C   Date Value Ref Range Status   02/17/2025 10.6 (H) 4.2 - 5.6 % Final     Comment:     (NOTE)  HbA1C Interpretive Ranges  <5.7              Normal  5.7 - 6.4         Consider Prediabetes  >6.5              Consider Diabetes         Interim update: Pt was last seen by me on 2/27/2025.  Per my prior note:

## 2025-03-13 ENCOUNTER — PHARMACY VISIT (OUTPATIENT)
Facility: CLINIC | Age: 35
End: 2025-03-13

## 2025-03-13 DIAGNOSIS — E10.65 TYPE 1 DIABETES MELLITUS WITH HYPERGLYCEMIA (HCC): Primary | ICD-10-CM

## 2025-03-14 ENCOUNTER — RESULTS FOLLOW-UP (OUTPATIENT)
Facility: HOSPITAL | Age: 35
End: 2025-03-14

## 2025-03-14 DIAGNOSIS — F90.9 ATTENTION DEFICIT HYPERACTIVITY DISORDER (ADHD), UNSPECIFIED ADHD TYPE: ICD-10-CM

## 2025-03-14 RX ORDER — DEXTROAMPHETAMINE SACCHARATE, AMPHETAMINE ASPARTATE, DEXTROAMPHETAMINE SULFATE AND AMPHETAMINE SULFATE 5; 5; 5; 5 MG/1; MG/1; MG/1; MG/1
30 TABLET ORAL 2 TIMES DAILY
Qty: 90 TABLET | Refills: 0 | Status: SHIPPED | OUTPATIENT
Start: 2025-03-14 | End: 2025-04-13

## 2025-03-14 RX ORDER — DEXTROAMPHETAMINE SACCHARATE, AMPHETAMINE ASPARTATE, DEXTROAMPHETAMINE SULFATE AND AMPHETAMINE SULFATE 5; 5; 5; 5 MG/1; MG/1; MG/1; MG/1
30 TABLET ORAL 2 TIMES DAILY
Qty: 60 TABLET | Refills: 0 | Status: CANCELLED | OUTPATIENT
Start: 2025-03-14 | End: 2025-04-03

## 2025-03-28 NOTE — PROGRESS NOTES
Pharmacy Progress Note - Diabetes Management       Assessment / Plan:   Diabetes Management:  Per ADA guidelines, Pt's A1c is not at goal of < 7%.  Pt is highly nonadherent to her insulin regimen.  She is missing most Lantus injections leading to an overall elevation.  Her Humalog ICR and CF are not being followed and she continues to give insulin unopposed outside of meals.  Stressed the importance of strict adherence to her insulin regimen to properly calculate her Omnipod dosing when she is switched to it.  Will reassess with CGM data in 2 weeks.      Nutrition/Lifestyle Modifications:  - Educated pt on the importance of moderating carbohydrate intake. Reviewed sources of carbohydrates and method to help determine appropriate portion sizes (e.g., Diabetes Plate Method).  - Advised patient to avoid sugar-sweetened beverages and replace with water or diet/zero sugar option.  - Recommend ~30 minutes consistent, moderately intensive, exercise/day or ~150 minutes/week. Start small, stay consistent, and increase length and types of exercise, as tolerated.       Patient will return to clinic in 2 week(s) for follow up.        S/O: Ms. Kaylan Stewart, a 35 y.o. female referred by Malinda Lerner APRN - NP,  has a past medical history of ADHD (attention deficit hyperactivity disorder), Diabetes mellitus type 1 (HCC), and Endometriosis.  Pt was seen today virtually via MyChart video visit for diabetes management.  Patient's last A1c was:   Hemoglobin A1C   Date Value Ref Range Status   02/17/2025 10.6 (H) 4.2 - 5.6 % Final     Comment:     (NOTE)  HbA1C Interpretive Ranges  <5.7              Normal  5.7 - 6.4         Consider Prediabetes  >6.5              Consider Diabetes         Interim update: Pt was last seen by me on 3/13/2025.  Per my prior note: Pt's A1c is not at goal of < 7%.  Pt's AGP report from the past 14 days shows time in target range 24%, average glucose 227 mg/dL, and GMI 8.7%.  Pt's basal control

## 2025-04-01 ENCOUNTER — PHARMACY VISIT (OUTPATIENT)
Facility: CLINIC | Age: 35
End: 2025-04-01

## 2025-04-01 DIAGNOSIS — E10.65 TYPE 1 DIABETES MELLITUS WITH HYPERGLYCEMIA (HCC): Primary | ICD-10-CM

## 2025-04-14 DIAGNOSIS — F90.9 ATTENTION DEFICIT HYPERACTIVITY DISORDER (ADHD), UNSPECIFIED ADHD TYPE: ICD-10-CM

## 2025-04-14 RX ORDER — DEXTROAMPHETAMINE SACCHARATE, AMPHETAMINE ASPARTATE, DEXTROAMPHETAMINE SULFATE AND AMPHETAMINE SULFATE 5; 5; 5; 5 MG/1; MG/1; MG/1; MG/1
30 TABLET ORAL 2 TIMES DAILY
Qty: 90 TABLET | Refills: 0 | Status: CANCELLED | OUTPATIENT
Start: 2025-04-14 | End: 2025-05-14

## 2025-04-14 NOTE — PROGRESS NOTES
Pharmacy Progress Note - Diabetes Management       Assessment / Plan:   Diabetes Management:  Per ADA guidelines, Pt's A1c is not at goal of < 7%.  Despite reported adherence to basal insulin, her basal control is poor.  This is influenced by poor prandial control mainly from missed doses in the evening which are persisting into the morning.  She is a good candidate for an Omnipod 5.  PA approved and will discuss prescribing it at the next visit if she is amenable.  Will provide training and education on the device if she is amenable.  Will have her adjust her ICR to 1:5 d/t her preference and will maintain the CF 1:25 > 150 mg/dL max 9 units > 350 mg/dL.  Encouraged increased adherence to the Humalog dosing to improve her glycemic control overall.  Will reassess with CGM data in 4 weeks.      Nutrition/Lifestyle Modifications:  - Educated pt on the importance of moderating carbohydrate intake. Reviewed sources of carbohydrates and method to help determine appropriate portion sizes (e.g., Diabetes Plate Method).  - Advised patient to avoid sugar-sweetened beverages and replace with water or diet/zero sugar option.  - Recommend ~30 minutes consistent, moderately intensive, exercise/day or ~150 minutes/week. Start small, stay consistent, and increase length and types of exercise, as tolerated.       Patient will return to clinic in 4 week(s) for follow up.        S/O: Ms. Kaylan Stewart, a 35 y.o. female referred by Malinda Lerner APRN - NP,  has a past medical history of ADHD (attention deficit hyperactivity disorder), Diabetes mellitus type 1 (HCC), and Endometriosis.  Pt was seen today virtually via MyChart video visit for diabetes management.  Patient's last A1c was:   Hemoglobin A1C   Date Value Ref Range Status   02/17/2025 10.6 (H) 4.2 - 5.6 % Final     Comment:     (NOTE)  HbA1C Interpretive Ranges  <5.7              Normal  5.7 - 6.4         Consider Prediabetes  >6.5              Consider Diabetes

## 2025-04-15 ENCOUNTER — PHARMACY VISIT (OUTPATIENT)
Facility: CLINIC | Age: 35
End: 2025-04-15

## 2025-04-15 DIAGNOSIS — E10.65 TYPE 1 DIABETES MELLITUS WITH HYPERGLYCEMIA (HCC): Primary | ICD-10-CM

## 2025-04-16 NOTE — TELEPHONE ENCOUNTER
[facility]derall as 3/14/25 for a 30 d/s.      Last Visit: 10/30/24  Next Appointment: None  Previous Refill Encounter(s) Adderall: Date: 3/14/25 #0  Controlled Substance Agreement: 10/30/24  Urine Drug Screen: None    Previous Refill Encounter(s) Vit D: Date: 2/27/25 #12    Requested Prescriptions     Pending Prescriptions Disp Refills    vitamin D (ERGOCALCIFEROL) 1.25 MG (51251 UT) CAPS capsule 12 capsule 3     Sig: Take 1 capsule by mouth once a week    amphetamine-dextroamphetamine (ADDERALL) 20 MG tablet 90 tablet 0     Sig: Take 1.5 tablets by mouth 2 times daily for 30 days. Max Daily Amount: 60 mg

## 2025-05-01 RX ORDER — ERGOCALCIFEROL 1.25 MG/1
50000 CAPSULE, LIQUID FILLED ORAL WEEKLY
Qty: 12 CAPSULE | Refills: 3 | Status: SHIPPED | OUTPATIENT
Start: 2025-05-01

## 2025-05-01 RX ORDER — DEXTROAMPHETAMINE SACCHARATE, AMPHETAMINE ASPARTATE, DEXTROAMPHETAMINE SULFATE AND AMPHETAMINE SULFATE 5; 5; 5; 5 MG/1; MG/1; MG/1; MG/1
30 TABLET ORAL 2 TIMES DAILY
Qty: 90 TABLET | Refills: 0 | Status: SHIPPED | OUTPATIENT
Start: 2025-07-17 | End: 2025-08-16

## 2025-05-01 RX ORDER — DEXTROAMPHETAMINE SACCHARATE, AMPHETAMINE ASPARTATE, DEXTROAMPHETAMINE SULFATE AND AMPHETAMINE SULFATE 5; 5; 5; 5 MG/1; MG/1; MG/1; MG/1
30 TABLET ORAL 2 TIMES DAILY
Qty: 90 TABLET | Refills: 0 | Status: SHIPPED | OUTPATIENT
Start: 2025-06-16 | End: 2025-07-16

## 2025-05-01 RX ORDER — DEXTROAMPHETAMINE SACCHARATE, AMPHETAMINE ASPARTATE, DEXTROAMPHETAMINE SULFATE AND AMPHETAMINE SULFATE 5; 5; 5; 5 MG/1; MG/1; MG/1; MG/1
30 TABLET ORAL 2 TIMES DAILY
Qty: 90 TABLET | Refills: 0 | Status: SHIPPED | OUTPATIENT
Start: 2025-05-17 | End: 2025-06-16

## 2025-05-13 NOTE — PROGRESS NOTES
Pharmacy Progress Note - Diabetes Management       Assessment / Plan:   Diabetes Management:  Per ADA guidelines, Pt's A1c is not at goal of < 7%.  Pt's overall glycemic control remains poor.  Her AGP report from the past 14 days shows time in target range 15%, average glucose 273 mg/dL, and GMI 9.8%.  Her basal values remain elevated likely influenced by poor adherence to her Humalog injections.  Encouraged increased adherence to her Humalog from now until she is able to start the Omnipod 5.  Orders placed for the Omnipod 5 and Dexcom G7 to start during training and education session which will be scheduled with the CPT.  Will reassess her CGM data in 4 weeks which should be after she starts the Omnipod 5.    Nutrition/Lifestyle Modifications:  - Educated pt on the importance of moderating carbohydrate intake. Reviewed sources of carbohydrates and method to help determine appropriate portion sizes (e.g., Diabetes Plate Method).  - Advised patient to avoid sugar-sweetened beverages and replace with water or diet/zero sugar option.  - Recommend ~30 minutes consistent, moderately intensive, exercise/day or ~150 minutes/week. Start small, stay consistent, and increase length and types of exercise, as tolerated.       Patient will return to clinic in 4 week(s) for follow up.        S/O: Ms. Kaylan Stewart, a 35 y.o. female referred by Malinda Lerner APRN - NP,  has a past medical history of ADHD (attention deficit hyperactivity disorder), Diabetes mellitus type 1 (HCC), and Endometriosis.  Pt was seen today virtually via MyChart video visit for diabetes management.  Patient's last A1c was:   Hemoglobin A1C   Date Value Ref Range Status   02/17/2025 10.6 (H) 4.2 - 5.6 % Final     Comment:     (NOTE)  HbA1C Interpretive Ranges  <5.7              Normal  5.7 - 6.4         Consider Prediabetes  >6.5              Consider Diabetes         Interim update: Pt was last seen by me on 4/15/2025.  Per my prior note: Pt's

## 2025-05-15 ENCOUNTER — PHARMACY VISIT (OUTPATIENT)
Facility: CLINIC | Age: 35
End: 2025-05-15

## 2025-05-15 DIAGNOSIS — E10.65 TYPE 1 DIABETES MELLITUS WITH HYPERGLYCEMIA (HCC): Primary | ICD-10-CM

## 2025-05-15 RX ORDER — INSULIN LISPRO 100 [IU]/ML
INJECTION, SOLUTION INTRAVENOUS; SUBCUTANEOUS
Qty: 60 ML | Refills: 3 | Status: SHIPPED | OUTPATIENT
Start: 2025-05-15

## 2025-05-15 RX ORDER — INSULIN PMP CART,AUT,G6/7,CNTR
EACH SUBCUTANEOUS
Qty: 6 EACH | Refills: 3 | Status: SHIPPED | OUTPATIENT
Start: 2025-05-15

## 2025-05-15 RX ORDER — ACYCLOVIR 400 MG/1
TABLET ORAL
Qty: 6 EACH | Refills: 3 | Status: SHIPPED | OUTPATIENT
Start: 2025-05-15

## 2025-05-15 RX ORDER — INSULIN PMP CART,AUT,G6/7,CNTR
EACH SUBCUTANEOUS
Qty: 1 KIT | Refills: 0 | Status: SHIPPED | OUTPATIENT
Start: 2025-05-15

## 2025-06-06 NOTE — PROGRESS NOTES
amphetamine-dextroamphetamine (ADDERALL) 20 MG tablet Take 1.5 tablets by mouth 2 times daily for 30 days. Max Daily Amount: 60 mg    insulin lispro, 1 Unit Dial, (HUMALOG KWIKPEN) 100 UNIT/ML SOPN INJECT AS INSTRUCTED (MAX OF 50 UNITS PER DAY)    Microlet Lancets MISC     omeprazole (PRILOSEC) 20 MG delayed release capsule Take 1 capsule by mouth every morning (before breakfast)    Insulin Pen Needle 32G X 4 MM MISC 1 each by Does not apply route daily To use once daily with lantus solostar    Insulin Disposable Pump (OMNIPOD 5 [id number] INTRO GEN 5) KIT Use to inject insulin continuously as directed (Patient not taking: Reported on 6/10/2025)    Insulin Disposable Pump (OMNIPOD 5 [id number] PODS GEN 5) MISC Use to inject insulin continuously as directed - change every 3 days (Patient not taking: Reported on 6/10/2025)    Continuous Glucose Sensor (DEXCOM G7 SENSOR) MISC Use to monitor blood glucose continuously - change every 10 days (Patient not taking: Reported on 6/10/2025)    insulin lispro (HUMALOG,ADMELOG) 100 UNIT/ML SOLN injection vial Use to inject up to 200 units every 3 days with the Omnipod (Patient not taking: Reported on 6/10/2025)    insulin glargine (LANTUS SOLOSTAR) 100 UNIT/ML injection pen Inject 60 Units into the skin nightly Adjust as directed (Patient not taking: Reported on 6/10/2025)    Continuous Glucose Sensor (FREESTYLE PRUDENCE 3 PLUS SENSOR) MISC Use as directed     No current facility-administered medications for this visit.     Allergies:  Allergies   Allergen Reactions    Iodinated Contrast Media Hives, Itching and Rash     Patient reports redness, localized swelling, and itching immediately after receiving IV contrast for a CT performed on 03Sep2022.       Blood Glucose Monitoring (BGM) or CGM:      ROS:  Today, Pt endorses:  - Symptoms of Hyperglycemia: none  - Symptoms of Hypoglycemia: none    Lifestyle modification(s):  - as above    Medication Adherence/Access:  - Endorses adherence to

## 2025-06-10 ENCOUNTER — PHARMACY VISIT (OUTPATIENT)
Facility: CLINIC | Age: 35
End: 2025-06-10

## 2025-06-10 DIAGNOSIS — E10.65 TYPE 1 DIABETES MELLITUS WITH HYPERGLYCEMIA (HCC): ICD-10-CM

## 2025-06-10 RX ORDER — INSULIN GLARGINE 100 [IU]/ML
60 INJECTION, SOLUTION SUBCUTANEOUS NIGHTLY
Qty: 15 ML | Refills: 11 | Status: SHIPPED | OUTPATIENT
Start: 2025-06-10

## 2025-06-10 RX ORDER — INSULIN PMP CART,AUT,G6/7,CNTR
EACH SUBCUTANEOUS
Qty: 1 KIT | Refills: 0 | Status: SHIPPED | OUTPATIENT
Start: 2025-06-10

## 2025-06-10 RX ORDER — INSULIN PMP CART,AUT,G6/7,CNTR
EACH SUBCUTANEOUS
Qty: 6 EACH | Refills: 3 | Status: SHIPPED | OUTPATIENT
Start: 2025-06-10

## 2025-06-10 RX ORDER — INSULIN LISPRO 100 [IU]/ML
INJECTION, SOLUTION INTRAVENOUS; SUBCUTANEOUS
Qty: 60 ML | Refills: 3 | Status: SHIPPED | OUTPATIENT
Start: 2025-06-10

## 2025-06-10 RX ORDER — ACYCLOVIR 400 MG/1
TABLET ORAL
Qty: 6 EACH | Refills: 3 | Status: SHIPPED | OUTPATIENT
Start: 2025-06-10

## 2025-06-16 DIAGNOSIS — E55.9 HYPOVITAMINOSIS D: ICD-10-CM

## 2025-06-16 DIAGNOSIS — E10.65 TYPE 1 DIABETES MELLITUS WITH HYPERGLYCEMIA (HCC): Primary | ICD-10-CM

## 2025-06-16 DIAGNOSIS — F90.9 ATTENTION DEFICIT HYPERACTIVITY DISORDER (ADHD), UNSPECIFIED ADHD TYPE: ICD-10-CM

## 2025-06-16 RX ORDER — DEXTROAMPHETAMINE SACCHARATE, AMPHETAMINE ASPARTATE, DEXTROAMPHETAMINE SULFATE AND AMPHETAMINE SULFATE 5; 5; 5; 5 MG/1; MG/1; MG/1; MG/1
30 TABLET ORAL 2 TIMES DAILY
Qty: 90 TABLET | Refills: 0 | Status: CANCELLED | OUTPATIENT
Start: 2025-06-16 | End: 2025-07-16

## 2025-06-20 ENCOUNTER — PATIENT MESSAGE (OUTPATIENT)
Facility: CLINIC | Age: 35
End: 2025-06-20

## 2025-06-20 DIAGNOSIS — E10.65 TYPE 1 DIABETES MELLITUS WITH HYPERGLYCEMIA (HCC): ICD-10-CM

## 2025-06-20 DIAGNOSIS — F90.9 ATTENTION DEFICIT HYPERACTIVITY DISORDER (ADHD), UNSPECIFIED ADHD TYPE: ICD-10-CM

## 2025-06-20 RX ORDER — DEXTROAMPHETAMINE SACCHARATE, AMPHETAMINE ASPARTATE, DEXTROAMPHETAMINE SULFATE AND AMPHETAMINE SULFATE 5; 5; 5; 5 MG/1; MG/1; MG/1; MG/1
30 TABLET ORAL 2 TIMES DAILY
Qty: 90 TABLET | Refills: 0 | Status: CANCELLED | OUTPATIENT
Start: 2025-06-20 | End: 2025-07-20

## 2025-06-23 RX ORDER — INSULIN LISPRO 100 [IU]/ML
INJECTION, SOLUTION INTRAVENOUS; SUBCUTANEOUS
Qty: 60 ML | Refills: 3 | Status: SHIPPED | OUTPATIENT
Start: 2025-06-23

## 2025-06-23 NOTE — TELEPHONE ENCOUNTER
Will resend the Humalog Rx for Omnipod fills.    Thank you,    Franklyn Britton, PharmD, BCACP, BC-[facility]    For Pharmacy Admin Tracking Only    Program: Medical Group  CPA in place:  Yes  Recommendation Provided To: Pharmacy: 1  Intervention Detail: Refill(s) Provided  Intervention Accepted By: Pharmacy: 1  Gap Closed?: Yes   Time Spent (min): 5

## 2025-07-01 PROBLEM — E55.9 HYPOVITAMINOSIS D: Status: ACTIVE | Noted: 2025-07-01

## 2025-07-08 ENCOUNTER — PHARMACY VISIT (OUTPATIENT)
Facility: CLINIC | Age: 35
End: 2025-07-08

## 2025-07-08 DIAGNOSIS — E10.65 TYPE 1 DIABETES MELLITUS WITH HYPERGLYCEMIA (HCC): Primary | ICD-10-CM

## 2025-07-08 RX ORDER — INSULIN LISPRO 100 [IU]/ML
INJECTION, SOLUTION INTRAVENOUS; SUBCUTANEOUS
Qty: 30 ML | Refills: 3 | Status: SHIPPED | OUTPATIENT
Start: 2025-07-08

## 2025-07-08 RX ORDER — INSULIN PMP CART,AUT,G6/7,CNTR
EACH SUBCUTANEOUS
Qty: 9 EACH | Refills: 3 | Status: SHIPPED | OUTPATIENT
Start: 2025-07-08

## 2025-07-08 RX ORDER — ACYCLOVIR 400 MG/1
TABLET ORAL
Qty: 9 EACH | Refills: 3 | Status: SHIPPED | OUTPATIENT
Start: 2025-07-08

## 2025-07-08 RX ORDER — INSULIN ASPART 100 [IU]/ML
INJECTION, SOLUTION INTRAVENOUS; SUBCUTANEOUS
Qty: 30 ML | Refills: 11 | Status: SHIPPED | OUTPATIENT
Start: 2025-07-08 | End: 2025-07-08 | Stop reason: CLARIF

## 2025-07-08 RX ORDER — INSULIN ASPART 100 [IU]/ML
INJECTION, SOLUTION INTRAVENOUS; SUBCUTANEOUS
Qty: 20 ML | Refills: 11 | Status: SHIPPED | OUTPATIENT
Start: 2025-07-08 | End: 2025-07-08 | Stop reason: SDUPTHER

## 2025-07-08 NOTE — PROGRESS NOTES
\"MALBCREARAT\"  -Immunizations:      Immunization History   Administered Date(s) Administered    TDaP, ADACEL (age 10y-64y), BOOSTRIX (age 10y+), IM, 0.5mL 07/10/2021       Additional Laboratory Parameters of Interest:   Estimation of renal function:  Lab Results   Component Value Date/Time    LABGLOM >90 02/17/2025 10:51 AM    GFRAA >60 09/23/2022 05:34 PM    GFRAA >60 09/23/2022 05:08 AM    GFRAA >60 09/23/2022 01:30 AM     Wt Readings from Last 3 Encounters:   10/30/24 68 kg (150 lb)   04/26/23 61.8 kg (136 lb 3.2 oz)   12/01/22 64 kg (141 lb)     Ht Readings from Last 1 Encounters:   10/30/24 1.575 m (5' 2\")     Calculated estimated creatinine clearance: CrCl cannot be calculated (Unknown ideal weight.).    Vital Signs Today:    There were no vitals taken for this visit.    Medications Discontinued During This Encounter   Medication Reason    insulin glargine (LANTUS SOLOSTAR) 100 UNIT/ML injection pen Therapy completed    insulin lispro, 1 Unit Dial, (HUMALOG KWIKPEN) 100 UNIT/ML SOPN Therapy completed    Insulin Pen Needle 32G X 4 MM MISC Therapy completed    Insulin Disposable Pump (OMNIPOD 5 [id number] PODS GEN 5) MISC REORDER    insulin aspart (NOVOLOG) 100 UNIT/ML injection vial REORDER    insulin aspart (NOVOLOG) 100 UNIT/ML injection vial Formulary change    insulin lispro (HUMALOG,ADMELOG) 100 UNIT/ML SOLN injection vial DOSE ADJUSTMENT    Continuous Glucose Sensor (DEXCOM G7 SENSOR) [facility] [place]       Orders Placed This Encounter    DISCONTD: insulin aspart (NOVOLOG) 100 UNIT/ML injection vial     Sig: Use to inject up to 200 units every 3 days with the Omnipod 5     Dispense:  20 mL     Refill:  11    Insulin Disposable Pump (OMNIPOD 5 [id number] PODS GEN 5) MISC     Sig: Use to inject insulin continuously as directed - change every 2 days     Dispense:  9 each     Refill:  3     Change in frequency - please fill early    DISCONTD: insulin aspart (NOVOLOG) 100 UNIT/ML injection vial     Sig: Use to inject up

## 2025-07-28 NOTE — PROGRESS NOTES
Pharmacy Progress Note - Diabetes Management       Assessment / Plan:   Diabetes Management:  Per ADA guidelines, Pt's A1c is not at goal of < 7%.  Pt's glycemic control has improved with the recent Omnipod setting changes based on TDD.  Her AGP report from the past 14 days shows time in target range 58%, average glucose 175 mg/dL, and GMI 7.5%.  Her basal values appear to be adequate, but she continues to have large post-prandial elevations from inadequate ICR.  Will change the ICR to 5 and maintain the CF of 24 for now.  Will reassess with CGM data in 2 weeks.     Patient will return to clinic in 2 week(s) for follow up.        S/O: Ms. Kaylan Stewart, a 35 y.o. female referred by Malinda Lerner APRN - NP,  has a past medical history of ADHD (attention deficit hyperactivity disorder), Diabetes mellitus type 1 (HCC), and Endometriosis.  Pt was seen today virtually via MyChart video visit for diabetes management.  Patient's last A1c was:   Hemoglobin A1C   Date Value Ref Range Status   02/17/2025 10.6 (H) 4.2 - 5.6 % Final     Comment:     (NOTE)  HbA1C Interpretive Ranges  <5.7              Normal  5.7 - 6.4         Consider Prediabetes  >6.5              Consider Diabetes       Hemoglobin A1C, External   Date Value Ref Range Status   07/12/2021 9.0 % Final       Interim update: Pt was last seen by me on 7/8/2025.  Per my prior note: Pt's A1c is not at goal of < 7%.  Pt's AGP report from the past 14 days shows time in target range 36%, average glucose 209 mg/dL, and GMI 8.3%.  Pt's basal control is poor from uncontrolled prandial fluctuations.  Her initial Omnipod settings were an estimation and not adequate to maintain euglycemia.  Will utilize her Omnipod 5 average TDD of insulin to adjust her basal rate, ICR, and CF as below.  Will reassess with CGM data in 3 weeks.    Today:   72 units TDD  36 basal 1.5 units/hr - max 3 units/hr  ICR: 6.3 --- 5   CF: 24  Reverse Correction OFF    She states that she

## 2025-07-29 ENCOUNTER — PHARMACY VISIT (OUTPATIENT)
Facility: CLINIC | Age: 35
End: 2025-07-29

## 2025-07-29 DIAGNOSIS — E10.65 TYPE 1 DIABETES MELLITUS WITH HYPERGLYCEMIA (HCC): Primary | ICD-10-CM

## 2025-08-14 ENCOUNTER — PHARMACY VISIT (OUTPATIENT)
Facility: CLINIC | Age: 35
End: 2025-08-14

## 2025-08-14 DIAGNOSIS — E10.65 TYPE 1 DIABETES MELLITUS WITH HYPERGLYCEMIA (HCC): Primary | ICD-10-CM

## 2025-08-20 DIAGNOSIS — F90.9 ATTENTION DEFICIT HYPERACTIVITY DISORDER (ADHD), UNSPECIFIED ADHD TYPE: ICD-10-CM

## 2025-08-20 RX ORDER — DEXTROAMPHETAMINE SACCHARATE, AMPHETAMINE ASPARTATE, DEXTROAMPHETAMINE SULFATE AND AMPHETAMINE SULFATE 5; 5; 5; 5 MG/1; MG/1; MG/1; MG/1
30 TABLET ORAL 2 TIMES DAILY
Qty: 90 TABLET | Refills: 0 | Status: CANCELLED | OUTPATIENT
Start: 2025-08-20 | End: 2025-09-19

## 2025-08-22 DIAGNOSIS — F90.9 ATTENTION DEFICIT HYPERACTIVITY DISORDER (ADHD), UNSPECIFIED ADHD TYPE: ICD-10-CM

## 2025-08-22 RX ORDER — DEXTROAMPHETAMINE SACCHARATE, AMPHETAMINE ASPARTATE, DEXTROAMPHETAMINE SULFATE AND AMPHETAMINE SULFATE 5; 5; 5; 5 MG/1; MG/1; MG/1; MG/1
30 TABLET ORAL 2 TIMES DAILY
Qty: 90 TABLET | Refills: 0 | Status: CANCELLED | OUTPATIENT
Start: 2025-08-22 | End: 2025-09-21

## 2025-08-27 RX ORDER — DEXTROAMPHETAMINE SACCHARATE, AMPHETAMINE ASPARTATE, DEXTROAMPHETAMINE SULFATE AND AMPHETAMINE SULFATE 5; 5; 5; 5 MG/1; MG/1; MG/1; MG/1
30 TABLET ORAL 2 TIMES DAILY
Qty: 90 TABLET | Refills: 0 | Status: SHIPPED | OUTPATIENT
Start: 2025-08-27 | End: 2025-09-26

## 2025-08-28 ENCOUNTER — PATIENT MESSAGE (OUTPATIENT)
Facility: CLINIC | Age: 35
End: 2025-08-28

## 2025-08-28 DIAGNOSIS — E10.65 TYPE 1 DIABETES MELLITUS WITH HYPERGLYCEMIA (HCC): ICD-10-CM

## 2025-08-28 RX ORDER — INSULIN LISPRO 100 [IU]/ML
INJECTION, SOLUTION INTRAVENOUS; SUBCUTANEOUS
Qty: 30 ML | Refills: 11 | Status: SHIPPED | OUTPATIENT
Start: 2025-08-28

## 2025-09-04 ENCOUNTER — PHARMACY VISIT (OUTPATIENT)
Facility: CLINIC | Age: 35
End: 2025-09-04

## 2025-09-04 DIAGNOSIS — E10.65 TYPE 1 DIABETES MELLITUS WITH HYPERGLYCEMIA (HCC): Primary | ICD-10-CM
